# Patient Record
Sex: FEMALE | Race: WHITE | Employment: OTHER | URBAN - METROPOLITAN AREA
[De-identification: names, ages, dates, MRNs, and addresses within clinical notes are randomized per-mention and may not be internally consistent; named-entity substitution may affect disease eponyms.]

---

## 2017-01-05 ENCOUNTER — ANTI-COAG VISIT (OUTPATIENT)
Dept: PHARMACY | Facility: CLINIC | Age: 77
End: 2017-01-05

## 2017-01-05 LAB — INR BLD: 2.4

## 2017-01-16 RX ORDER — DILTIAZEM HYDROCHLORIDE 120 MG/1
CAPSULE, EXTENDED RELEASE ORAL
Qty: 30 CAPSULE | Refills: 5 | Status: SHIPPED | OUTPATIENT
Start: 2017-01-16 | End: 2017-08-10 | Stop reason: SDUPTHER

## 2017-02-02 ENCOUNTER — ANTI-COAG VISIT (OUTPATIENT)
Dept: PHARMACY | Facility: CLINIC | Age: 77
End: 2017-02-02

## 2017-02-02 LAB — INR BLD: 1.9

## 2017-03-02 ENCOUNTER — ANTI-COAG VISIT (OUTPATIENT)
Dept: PHARMACY | Facility: CLINIC | Age: 77
End: 2017-03-02

## 2017-03-02 LAB — INR BLD: 2.2

## 2017-03-29 ENCOUNTER — TELEPHONE (OUTPATIENT)
Dept: CARDIOLOGY CLINIC | Age: 77
End: 2017-03-29

## 2017-03-30 ENCOUNTER — ANTI-COAG VISIT (OUTPATIENT)
Dept: PHARMACY | Facility: CLINIC | Age: 77
End: 2017-03-30

## 2017-03-30 LAB — INR BLD: 1.9

## 2017-04-03 ENCOUNTER — TELEPHONE (OUTPATIENT)
Dept: CARDIOLOGY CLINIC | Age: 77
End: 2017-04-03

## 2017-04-04 ENCOUNTER — TELEPHONE (OUTPATIENT)
Dept: CARDIOLOGY CLINIC | Age: 77
End: 2017-04-04

## 2017-04-04 ENCOUNTER — OFFICE VISIT (OUTPATIENT)
Dept: CARDIOLOGY CLINIC | Age: 77
End: 2017-04-04

## 2017-04-04 VITALS
BODY MASS INDEX: 32.27 KG/M2 | HEART RATE: 83 BPM | OXYGEN SATURATION: 93 % | SYSTOLIC BLOOD PRESSURE: 128 MMHG | WEIGHT: 189 LBS | HEIGHT: 64 IN | DIASTOLIC BLOOD PRESSURE: 72 MMHG

## 2017-04-04 DIAGNOSIS — Z01.818 PREOPERATIVE CLEARANCE: Primary | ICD-10-CM

## 2017-04-04 DIAGNOSIS — Z01.810 PRE-OPERATIVE CARDIOVASCULAR EXAMINATION: ICD-10-CM

## 2017-04-04 PROCEDURE — 93000 ELECTROCARDIOGRAM COMPLETE: CPT | Performed by: INTERNAL MEDICINE

## 2017-04-04 PROCEDURE — 99214 OFFICE O/P EST MOD 30 MIN: CPT | Performed by: INTERNAL MEDICINE

## 2017-05-04 ENCOUNTER — ANTI-COAG VISIT (OUTPATIENT)
Dept: PHARMACY | Facility: CLINIC | Age: 77
End: 2017-05-04

## 2017-05-04 LAB — INR BLD: 1.8

## 2017-06-01 ENCOUNTER — ANTI-COAG VISIT (OUTPATIENT)
Dept: PHARMACY | Facility: CLINIC | Age: 77
End: 2017-06-01

## 2017-06-01 LAB — INR BLD: 3.3

## 2017-06-15 ENCOUNTER — ANTI-COAG VISIT (OUTPATIENT)
Dept: PHARMACY | Facility: CLINIC | Age: 77
End: 2017-06-15

## 2017-06-15 LAB — INR BLD: 2.2

## 2017-06-29 ENCOUNTER — OFFICE VISIT (OUTPATIENT)
Dept: CARDIOLOGY CLINIC | Age: 77
End: 2017-06-29

## 2017-06-29 VITALS
HEART RATE: 85 BPM | DIASTOLIC BLOOD PRESSURE: 82 MMHG | SYSTOLIC BLOOD PRESSURE: 134 MMHG | BODY MASS INDEX: 34.6 KG/M2 | WEIGHT: 188 LBS | HEIGHT: 62 IN

## 2017-06-29 DIAGNOSIS — I48.0 PAF (PAROXYSMAL ATRIAL FIBRILLATION) (HCC): Primary | ICD-10-CM

## 2017-06-29 PROCEDURE — 99213 OFFICE O/P EST LOW 20 MIN: CPT | Performed by: NURSE PRACTITIONER

## 2017-06-29 PROCEDURE — 93000 ELECTROCARDIOGRAM COMPLETE: CPT | Performed by: NURSE PRACTITIONER

## 2017-07-13 ENCOUNTER — ANTI-COAG VISIT (OUTPATIENT)
Dept: PHARMACY | Facility: CLINIC | Age: 77
End: 2017-07-13

## 2017-07-13 LAB — INR BLD: 2.3

## 2017-07-26 RX ORDER — WARFARIN SODIUM 5 MG/1
5 TABLET ORAL DAILY
Qty: 30 TABLET | Refills: 9 | Status: SHIPPED | OUTPATIENT
Start: 2017-07-26 | End: 2018-02-14 | Stop reason: SDUPTHER

## 2017-08-10 ENCOUNTER — ANTI-COAG VISIT (OUTPATIENT)
Dept: PHARMACY | Facility: CLINIC | Age: 77
End: 2017-08-10

## 2017-08-10 LAB — INR BLD: 2

## 2017-08-10 RX ORDER — DILTIAZEM HYDROCHLORIDE 120 MG/1
CAPSULE, EXTENDED RELEASE ORAL
Qty: 30 CAPSULE | Refills: 5 | Status: SHIPPED | OUTPATIENT
Start: 2017-08-10 | End: 2018-03-09 | Stop reason: SDUPTHER

## 2017-09-07 ENCOUNTER — ANTI-COAG VISIT (OUTPATIENT)
Dept: PHARMACY | Facility: CLINIC | Age: 77
End: 2017-09-07

## 2017-09-07 LAB — INR BLD: 2.1

## 2017-10-05 ENCOUNTER — ANTI-COAG VISIT (OUTPATIENT)
Dept: PHARMACY | Facility: CLINIC | Age: 77
End: 2017-10-05

## 2017-10-05 LAB — INR BLD: 1.5

## 2017-10-05 NOTE — PROGRESS NOTES
Ms. Calista Mcguire is here for management of anticoagulation for Afib. Patient was started on Warfarin on 8/6/15. PMH also significant for colonic polyp, and vertigo. She presents today w/out complaint. Pt verifies dosing regimen as listed above. Pt denies s/s bleeding/bruising/swelling/SOB. No BRBPR. No melena. Pt reports no missed doses. No changes in Rx/OTC/herbal medications. Still taking Glucosamine + chondroitin (2 a day). No tobacco use. She reports that she drinks about 8.4 oz of alcohol per week. INR 1.5 is below acceptable therapeutic range of 2-3. Recommend to take 7.5mg today only then continue 5mg Q Sun/Tue/Thu and 2.5 mg all other days. Patient has 5 mg tablets. Will continue to monitor and check INR in 2 weeks. Dosing reminder card given with phone number, appointment date and time.    Return to clinic: 10/19/17 @ 945 am

## 2017-10-19 ENCOUNTER — ANTI-COAG VISIT (OUTPATIENT)
Dept: PHARMACY | Facility: CLINIC | Age: 77
End: 2017-10-19

## 2017-10-19 LAB — INR BLD: 2

## 2017-10-19 NOTE — PROGRESS NOTES
Ms. Yogesh Luong is here for management of anticoagulation for Afib. Patient was started on Warfarin on 8/6/15. PMH also significant for colonic polyp, and vertigo. She presents today w/out complaint. Pt verifies dosing regimen as listed above. Pt denies s/s bleeding/bruising/swelling/SOB. No BRBPR. No melena. Pt reports no missed doses. No changes in Rx/OTC/herbal medications. Still taking Glucosamine + chondroitin (2 a day). No tobacco use. She reports that she drinks about 8.4 oz of alcohol per week. INR 2.0 is within acceptable therapeutic range of 2-3. Recommend to continue 5mg Q Sun/Tue/Thu and 2.5 mg all other days. Patient has 5 mg tablets. Will continue to monitor and check INR in 4 weeks. Dosing reminder card given with phone number, appointment date and time.    Return to clinic: 11/16/17 @ 945 am    Edward Hinds PharmD 9:48 AM 10/19/17

## 2017-11-01 ENCOUNTER — HOSPITAL ENCOUNTER (OUTPATIENT)
Dept: OTHER | Age: 77
Discharge: HOME | End: 2017-11-30
Attending: FAMILY MEDICINE | Admitting: FAMILY MEDICINE

## 2017-11-16 ENCOUNTER — ANTI-COAG VISIT (OUTPATIENT)
Dept: PHARMACY | Facility: CLINIC | Age: 77
End: 2017-11-16

## 2017-11-16 LAB — INR BLD: 1.7

## 2017-11-30 ENCOUNTER — ANTI-COAG VISIT (OUTPATIENT)
Dept: PHARMACY | Facility: CLINIC | Age: 77
End: 2017-11-30

## 2017-11-30 LAB — INR BLD: 2.6

## 2017-12-01 ENCOUNTER — HOSPITAL ENCOUNTER (OUTPATIENT)
Dept: OTHER | Age: 77
Discharge: HOME | End: 2017-12-31
Attending: FAMILY MEDICINE | Admitting: FAMILY MEDICINE

## 2018-01-04 ENCOUNTER — HOSPITAL ENCOUNTER (OUTPATIENT)
Dept: OTHER | Age: 78
Discharge: HOME | End: 2018-01-31
Attending: FAMILY MEDICINE | Admitting: FAMILY MEDICINE

## 2018-01-04 ENCOUNTER — ANTI-COAG VISIT (OUTPATIENT)
Dept: PHARMACY | Facility: CLINIC | Age: 78
End: 2018-01-04

## 2018-01-04 LAB — INR BLD: 2.9

## 2018-02-01 ENCOUNTER — ANTI-COAG VISIT (OUTPATIENT)
Dept: PHARMACY | Facility: CLINIC | Age: 78
End: 2018-02-01

## 2018-02-01 ENCOUNTER — HOSPITAL ENCOUNTER (OUTPATIENT)
Dept: OTHER | Age: 78
Discharge: HOME | End: 2018-02-28
Attending: FAMILY MEDICINE | Admitting: FAMILY MEDICINE

## 2018-02-01 LAB — INR BLD: 2.5

## 2018-02-01 NOTE — PROGRESS NOTES
Ms. Shannon Stacy is here for management of anticoagulation for Afib. Patient was started on Warfarin on 8/6/15. PMH also significant for colonic polyp, and vertigo. She presents today w/out complaint. Pt verifies dosing regimen as listed above. Pt denies s/s bleeding/bruising/swelling/SOB. No BRBPR. No melena. Pt reports no missed doses. No changes in Rx/OTC/herbal medications. Still taking Glucosamine + chondroitin (2 a day). No tobacco use. She reports that she drinks about 8.4 oz of alcohol per week. INR 2.5 is within acceptable therapeutic range of 2-3. Recommend to continue dose of 2.5 mg Mon/Wed/Fri and 5 mg all other days. Patient has 5 mg tablets. Will continue to monitor and check INR in 4 weeks. Dosing reminder card given with phone number, appointment date and time.    Return to clinic: 3/1 @ [address] [place], [place] [address]Gila 10:38 AM 2/1/18

## 2018-02-14 RX ORDER — WARFARIN SODIUM 5 MG/1
5 TABLET ORAL DAILY
Qty: 30 TABLET | Refills: 3 | Status: SHIPPED | OUTPATIENT
Start: 2018-02-14 | End: 2019-05-13 | Stop reason: SDUPTHER

## 2018-02-14 NOTE — TELEPHONE ENCOUNTER
Refill needed warfarin (COUMADIN) 5 MG tablet    [id number] [place] [facility] [address] [phone]

## 2018-03-01 ENCOUNTER — HOSPITAL ENCOUNTER (OUTPATIENT)
Dept: OTHER | Age: 78
Discharge: HOME | End: 2018-03-31
Attending: FAMILY MEDICINE | Admitting: FAMILY MEDICINE

## 2018-03-01 ENCOUNTER — ANTI-COAG VISIT (OUTPATIENT)
Dept: PHARMACY | Facility: CLINIC | Age: 78
End: 2018-03-01

## 2018-03-01 LAB — INR BLD: 2.7

## 2018-03-01 NOTE — PROGRESS NOTES
Ms. Beny Benavides is here for management of anticoagulation for Afib. Patient was started on Warfarin on 8/6/15. PMH also significant for colonic polyp, and vertigo. She presents today w/out complaint. Pt verifies dosing regimen as listed above. Pt denies s/s bleeding/bruising/swelling/SOB. No BRBPR. No melena. Pt reports no missed doses. No changes in Rx/OTC/herbal medications. Still taking Glucosamine + chondroitin (2 a day). No tobacco use. She reports that she drinks about 8.4 oz of alcohol per week. INR 2.7 is within acceptable therapeutic range of 2-3. Recommend to continue dose of 2.5 mg Mon/Wed/Fri and 5 mg all other days. Patient has 5 mg tablets. Will continue to monitor and check INR in 4 weeks. Dosing reminder card given with phone number, appointment date and time.    Return to clinic: 3/29 @ 9:45 am    Benjamin Tenorio, PharmD 3/1/2018 10:08 AM

## 2018-03-09 RX ORDER — DILTIAZEM HYDROCHLORIDE 120 MG/1
CAPSULE, EXTENDED RELEASE ORAL
Qty: 30 CAPSULE | Refills: 3 | Status: SHIPPED | OUTPATIENT
Start: 2018-03-09 | End: 2018-08-23 | Stop reason: SDUPTHER

## 2018-03-09 NOTE — TELEPHONE ENCOUNTER
Pt sts her refill ondiltiazem (TIAZAC) 120 MG extended release capsule      has not been sent in. Pt sts she is completely out.

## 2018-03-27 ENCOUNTER — TELEPHONE (OUTPATIENT)
Dept: CARDIOLOGY CLINIC | Age: 78
End: 2018-03-27

## 2018-03-29 ENCOUNTER — ANTI-COAG VISIT (OUTPATIENT)
Dept: PHARMACY | Facility: CLINIC | Age: 78
End: 2018-03-29

## 2018-03-29 LAB — INR BLD: 2.5

## 2018-04-01 ENCOUNTER — HOSPITAL ENCOUNTER (OUTPATIENT)
Dept: OTHER | Age: 78
Discharge: HOME | End: 2018-04-30
Attending: FAMILY MEDICINE | Admitting: FAMILY MEDICINE

## 2018-04-26 ENCOUNTER — ANTI-COAG VISIT (OUTPATIENT)
Dept: PHARMACY | Facility: CLINIC | Age: 78
End: 2018-04-26

## 2018-04-26 LAB — INR BLD: 3.5

## 2018-05-01 ENCOUNTER — HOSPITAL ENCOUNTER (OUTPATIENT)
Dept: OTHER | Age: 78
Discharge: HOME | End: 2018-05-31
Attending: FAMILY MEDICINE | Admitting: FAMILY MEDICINE

## 2018-05-10 ENCOUNTER — ANTI-COAG VISIT (OUTPATIENT)
Dept: PHARMACY | Facility: CLINIC | Age: 78
End: 2018-05-10

## 2018-05-10 LAB — INR BLD: 2.9

## 2018-06-01 ENCOUNTER — HOSPITAL ENCOUNTER (OUTPATIENT)
Dept: OTHER | Age: 78
Discharge: HOME | End: 2018-06-30
Attending: FAMILY MEDICINE | Admitting: FAMILY MEDICINE

## 2018-06-07 ENCOUNTER — ANTI-COAG VISIT (OUTPATIENT)
Dept: PHARMACY | Facility: CLINIC | Age: 78
End: 2018-06-07

## 2018-06-07 LAB — INR BLD: 3.5

## 2018-06-21 ENCOUNTER — ANTI-COAG VISIT (OUTPATIENT)
Dept: PHARMACY | Facility: CLINIC | Age: 78
End: 2018-06-21

## 2018-06-21 LAB — INR BLD: 3

## 2018-07-01 ENCOUNTER — HOSPITAL ENCOUNTER (OUTPATIENT)
Dept: OTHER | Age: 78
Discharge: HOME | End: 2018-07-01
Attending: FAMILY MEDICINE | Admitting: FAMILY MEDICINE

## 2018-07-05 ENCOUNTER — ANTI-COAG VISIT (OUTPATIENT)
Dept: PHARMACY | Facility: CLINIC | Age: 78
End: 2018-07-05

## 2018-07-05 LAB — INR BLD: 2.3

## 2018-08-02 ENCOUNTER — ANTI-COAG VISIT (OUTPATIENT)
Dept: PHARMACY | Age: 78
End: 2018-08-02
Payer: MEDICARE

## 2018-08-02 LAB — INR BLD: 1.9

## 2018-08-02 PROCEDURE — 85610 PROTHROMBIN TIME: CPT

## 2018-08-02 PROCEDURE — 99211 OFF/OP EST MAY X REQ PHY/QHP: CPT

## 2018-08-23 ENCOUNTER — OFFICE VISIT (OUTPATIENT)
Dept: CARDIOLOGY CLINIC | Age: 78
End: 2018-08-23

## 2018-08-23 VITALS
HEIGHT: 62 IN | HEART RATE: 74 BPM | DIASTOLIC BLOOD PRESSURE: 64 MMHG | SYSTOLIC BLOOD PRESSURE: 118 MMHG | BODY MASS INDEX: 35.51 KG/M2 | WEIGHT: 193 LBS

## 2018-08-23 DIAGNOSIS — I48.0 PAF (PAROXYSMAL ATRIAL FIBRILLATION) (HCC): Primary | ICD-10-CM

## 2018-08-23 PROCEDURE — 99213 OFFICE O/P EST LOW 20 MIN: CPT | Performed by: NURSE PRACTITIONER

## 2018-08-23 PROCEDURE — 93000 ELECTROCARDIOGRAM COMPLETE: CPT | Performed by: NURSE PRACTITIONER

## 2018-08-23 NOTE — PROGRESS NOTES
[facility]   Electrophysiology  Note              Date:  August 23, 2018  Patient name: Fior Tripp  YOB: 1940    Primary Care physician: Sharee Connor MD    HISTORY OF PRESENT ILLNESS: The patient is a 69 y.o.  female with a history of symptomatic paroxysmal atrial fibrillation and vertigo. She was diagnosed with atrial fibrillation in 8/2015 after going to the ER with chest pain. She converted to sinus rhythm spontaneously. Echo in 8/2015 showed an EF of 55-60%. She has remained in sinus rhythm at subsequent visits. Today she is being seen for paroxysmal atrial fibrillation. Her EKG shows sinus rhythm with a heart rate of 75. She is feeling generally well, has some fatigue. Has taken one extra Cardizem \"rarely\" due to \"heart feeling funny\". Estimates she has done this 3 times in the last year and feels relief after taking. Has difficulty describing sensation. Denies chest pain and dizziness. States her  does not report she snores. Past Medical History:   has a past medical history of Atrial fibrillation ([address] Colonic polyp; Paroxysmal atrial fibrillation ([address] and Vertigo. Past Surgical History:   has a past surgical history that includes Tonsillectomy; Colonoscopy (07/31/2007); and joint replacement (04/2016). Home Medications:    Prior to Admission medications    Medication Sig Start Date End Date Taking? Authorizing Provider   diltiazem [facility]) 120 MG extended release capsule TAKE 1 CAPSULE BY MOUTH DAILY 3/12/18  Yes JENNIFER Tiwari CNP   warfarin (COUMADIN) 5 MG tablet Take 1 tablet by mouth daily 2/14/18  Yes JENNIFER Tiwari CNP   meclizine (ANTIVERT) 25 MG tablet Take 25 mg by mouth 3 times daily as needed   Yes Historical Provider, MD   Glucosamine Sulfate 750 MG TABS Take 1 tablet by mouth daily. Yes Historical Provider, MD   Multiple Vitamin (MULTIVITAMIN PO) Take  by mouth.      Yes Historical Provider, MD

## 2018-08-23 NOTE — COMMUNICATION BODY
LUTEIN PO Take  by mouth. Yes Historical Provider, MD       Allergies:  Lyrica [pregabalin]    Social History:   reports that she has never smoked. She has never used smokeless tobacco. She reports that she drinks about 8.4 oz of alcohol per week . She reports that she does not use drugs. Family History: family history includes Colon Cancer (age of onset: 61) in her mother. Review of Systems   Constitutional: + fatigue  HENT: Negative. Eyes: Negative. Respiratory: Negative. Cardiovascular: see HPI  Gastrointestinal: Negative. Genitourinary: Negative. Musculoskeletal: Negative   Skin: Negative. Neurological: Negative. Hematological: Negative. Psychiatric/Behavioral: Negative. PHYSICAL EXAM:    Physical Examination:    /64   Pulse 74   Ht 5' 2\" (1.575 m)   Wt 193 lb (87.5 kg)   BMI 35.30 kg/m²       Constitutional and general appearance: alert, cooperative, no distress and appears stated age  [de-identified]: PERRL, no cervical lymphadenopathy. No masses palpable.  Normal oral mucosa  Respiratory:  · Normal excursion and expansion without use of accessory muscles  · Resp auscultation: Normal breath sounds without dullness or wheezing  Cardiovascular:  · The apical impulse is not displaced  · Heart tones are crisp and normal. Regular S1 and S2.  · Jugular venous pulsation Normal  · The carotid upstroke is normal in amplitude and contour without delay or bruit  · Peripheral pulses are symmetrical and full   Abdomen:  · No masses or tenderness  · Bowel sounds present  Extremities:  ·  No cyanosis or clubbing  ·  No lower extremity edema (left ankle is larger than right ankle since hip surgery)  ·  Skin: warm and dry  Neurological:  · Alert and oriented  · Moves all extremities equally  · No abnormalities of mood, affect, memory, mentation, or behavior are noted    DATA:    ECG 8/23/2018:  SR HR 75    Echo 8/6/2015  Left ventricle size is normal.   Moderate concentric left

## 2018-09-06 ENCOUNTER — ANTI-COAG VISIT (OUTPATIENT)
Dept: PHARMACY | Age: 78
End: 2018-09-06
Payer: MEDICARE

## 2018-09-06 LAB — INR BLD: 2.8

## 2018-09-06 PROCEDURE — 99211 OFF/OP EST MAY X REQ PHY/QHP: CPT | Performed by: PHARMACIST

## 2018-09-06 PROCEDURE — 85610 PROTHROMBIN TIME: CPT | Performed by: PHARMACIST

## 2018-10-04 ENCOUNTER — ANTI-COAG VISIT (OUTPATIENT)
Dept: PHARMACY | Age: 78
End: 2018-10-04
Payer: MEDICARE

## 2018-10-04 LAB — INR BLD: 2.5

## 2018-10-04 PROCEDURE — 99211 OFF/OP EST MAY X REQ PHY/QHP: CPT | Performed by: FAMILY MEDICINE

## 2018-10-04 PROCEDURE — 85610 PROTHROMBIN TIME: CPT | Performed by: FAMILY MEDICINE

## 2018-11-01 ENCOUNTER — ANTI-COAG VISIT (OUTPATIENT)
Dept: PHARMACY | Age: 78
End: 2018-11-01
Payer: MEDICARE

## 2018-11-01 LAB — INTERNATIONAL NORMALIZATION RATIO, POC: 1.8

## 2018-11-01 PROCEDURE — 99211 OFF/OP EST MAY X REQ PHY/QHP: CPT

## 2018-11-01 PROCEDURE — 85610 PROTHROMBIN TIME: CPT

## 2018-11-18 NOTE — PROGRESS NOTES
Ms. Karri Garza is here for management of anticoagulation for Afib. Patient was started on Warfarin on 8/6/15. PMH also significant for colonic polyp, and vertigo. She presents today w/out complaint. Pt verifies dosing regimen as listed above. Pt denies s/s bleeding/bruising/swelling/SOB. Pt reports no missed doses. No changes in Rx/OTC/herbal medications. Still taking Glucosamine + chondroitin (2 a day) and also meclizine. No tobacco use. She reports that she drinks one drink about every night. EtOH intake and diet has been inconsistent. Has improved per patient    Patient reports she has eaten more greens than normal this week. INR 1.9 is within acceptable therapeutic range of 2-3. Recommend to continue 5 mg Mon/Wed/Fri; and 2.5 mg all other days. Pt will cut back on greens in her diet. Patient has 5 mg tablets. Will continue to monitor and check INR in 4 weeks. Dosing reminder card given with phone number, appointment date and time.    Return to clinic: 9/6/18 at 945am. Per EMS pt reports \"bending over with grabber bar last night and heard a \"pop\" in chest\". Reports right sided chest pain. Painful to touch.

## 2018-11-29 ENCOUNTER — ANTI-COAG VISIT (OUTPATIENT)
Dept: PHARMACY | Age: 78
End: 2018-11-29
Payer: MEDICARE

## 2018-11-29 LAB — INR BLD: 2.4

## 2018-11-29 PROCEDURE — 85610 PROTHROMBIN TIME: CPT | Performed by: FAMILY MEDICINE

## 2018-11-29 PROCEDURE — 99211 OFF/OP EST MAY X REQ PHY/QHP: CPT | Performed by: FAMILY MEDICINE

## 2019-01-03 ENCOUNTER — ANTI-COAG VISIT (OUTPATIENT)
Dept: PHARMACY | Age: 79
End: 2019-01-03
Payer: MEDICARE

## 2019-01-03 LAB — INR BLD: 2.7

## 2019-01-03 PROCEDURE — 99211 OFF/OP EST MAY X REQ PHY/QHP: CPT

## 2019-01-03 PROCEDURE — 85610 PROTHROMBIN TIME: CPT

## 2019-01-31 ENCOUNTER — ANTI-COAG VISIT (OUTPATIENT)
Dept: PHARMACY | Age: 79
End: 2019-01-31
Payer: MEDICARE

## 2019-01-31 LAB — INR BLD: 2.5

## 2019-01-31 PROCEDURE — 85610 PROTHROMBIN TIME: CPT | Performed by: FAMILY MEDICINE

## 2019-01-31 PROCEDURE — 99211 OFF/OP EST MAY X REQ PHY/QHP: CPT | Performed by: FAMILY MEDICINE

## 2019-02-28 ENCOUNTER — ANTI-COAG VISIT (OUTPATIENT)
Dept: PHARMACY | Age: 79
End: 2019-02-28
Payer: MEDICARE

## 2019-02-28 LAB — INR BLD: 1.8

## 2019-02-28 PROCEDURE — 99211 OFF/OP EST MAY X REQ PHY/QHP: CPT | Performed by: FAMILY MEDICINE

## 2019-02-28 PROCEDURE — 85610 PROTHROMBIN TIME: CPT | Performed by: FAMILY MEDICINE

## 2019-03-28 ENCOUNTER — ANTI-COAG VISIT (OUTPATIENT)
Dept: PHARMACY | Age: 79
End: 2019-03-28
Payer: MEDICARE

## 2019-03-28 LAB — INR BLD: 2.1

## 2019-03-28 PROCEDURE — 85610 PROTHROMBIN TIME: CPT

## 2019-03-28 PROCEDURE — 99211 OFF/OP EST MAY X REQ PHY/QHP: CPT

## 2019-04-01 DIAGNOSIS — I48.0 PAF (PAROXYSMAL ATRIAL FIBRILLATION) (HCC): ICD-10-CM

## 2019-04-02 RX ORDER — DILTIAZEM HYDROCHLORIDE 120 MG/1
CAPSULE, EXTENDED RELEASE ORAL
Qty: 90 CAPSULE | Refills: 1 | Status: SHIPPED | OUTPATIENT
Start: 2019-04-02 | End: 2019-07-09 | Stop reason: SDUPTHER

## 2019-04-25 ENCOUNTER — ANTI-COAG VISIT (OUTPATIENT)
Dept: PHARMACY | Age: 79
End: 2019-04-25
Payer: MEDICARE

## 2019-04-25 LAB — INTERNATIONAL NORMALIZATION RATIO, POC: 2.2

## 2019-04-25 PROCEDURE — 99211 OFF/OP EST MAY X REQ PHY/QHP: CPT

## 2019-04-25 PROCEDURE — 85610 PROTHROMBIN TIME: CPT

## 2019-05-13 ENCOUNTER — TELEPHONE (OUTPATIENT)
Dept: CARDIOLOGY CLINIC | Age: 79
End: 2019-05-13

## 2019-05-13 RX ORDER — WARFARIN SODIUM 5 MG/1
5 TABLET ORAL DAILY
Qty: 90 TABLET | Refills: 0 | Status: SHIPPED | OUTPATIENT
Start: 2019-05-13 | End: 2020-06-09 | Stop reason: SDUPTHER

## 2019-05-14 DIAGNOSIS — I48.0 PAF (PAROXYSMAL ATRIAL FIBRILLATION) (HCC): Primary | ICD-10-CM

## 2019-05-23 ENCOUNTER — HOSPITAL ENCOUNTER (OUTPATIENT)
Age: 79
Discharge: HOME | End: 2019-05-23
Payer: MEDICARE

## 2019-05-23 ENCOUNTER — ANTI-COAG VISIT (OUTPATIENT)
Dept: PHARMACY | Age: 79
End: 2019-05-23
Payer: MEDICARE

## 2019-05-23 DIAGNOSIS — I48.0 PAF (PAROXYSMAL ATRIAL FIBRILLATION) (HCC): ICD-10-CM

## 2019-05-23 LAB
BASOPHILS ABSOLUTE: 0.1 K/UL (ref 0–0.2)
BASOPHILS RELATIVE PERCENT: 1.1 %
EOSINOPHILS ABSOLUTE: 0.2 K/UL (ref 0–0.6)
EOSINOPHILS RELATIVE PERCENT: 2.7 %
HCT VFR BLD CALC: 44.3 % (ref 36–48)
HEMOGLOBIN: 15.2 G/DL (ref 12–16)
INR BLD: 2.2
LYMPHOCYTES ABSOLUTE: 2.5 K/UL (ref 1–5.1)
LYMPHOCYTES RELATIVE PERCENT: 44 %
MCH RBC QN AUTO: 33.9 PG (ref 26–34)
MCHC RBC AUTO-ENTMCNC: 34.4 G/DL (ref 31–36)
MCV RBC AUTO: 98.8 FL (ref 80–100)
MONOCYTES ABSOLUTE: 0.7 K/UL (ref 0–1.3)
MONOCYTES RELATIVE PERCENT: 11.4 %
NEUTROPHILS ABSOLUTE: 2.3 K/UL (ref 1.7–7.7)
NEUTROPHILS RELATIVE PERCENT: 40.8 %
PDW BLD-RTO: 13.1 % (ref 12.4–15.4)
PLATELET # BLD: 242 K/UL (ref 135–450)
PMV BLD AUTO: 7.6 FL (ref 5–10.5)
RBC # BLD: 4.48 M/UL (ref 4–5.2)
WBC # BLD: 5.8 K/UL (ref 4–11)

## 2019-05-23 PROCEDURE — 85610 PROTHROMBIN TIME: CPT

## 2019-05-23 PROCEDURE — 36415 COLL VENOUS BLD VENIPUNCTURE: CPT

## 2019-05-23 PROCEDURE — 99211 OFF/OP EST MAY X REQ PHY/QHP: CPT

## 2019-05-23 PROCEDURE — 85025 COMPLETE CBC W/AUTO DIFF WBC: CPT

## 2019-05-28 ENCOUNTER — TELEPHONE (OUTPATIENT)
Dept: CARDIOLOGY CLINIC | Age: 79
End: 2019-05-28

## 2019-05-28 NOTE — TELEPHONE ENCOUNTER
Created telephone encounter. Spoke with NPBB relayed message per NPBB regarding labs. Pt verbalized understanding.

## 2019-06-20 ENCOUNTER — ANTI-COAG VISIT (OUTPATIENT)
Dept: PHARMACY | Age: 79
End: 2019-06-20
Payer: MEDICARE

## 2019-06-20 LAB — INR BLD: 2.3

## 2019-06-20 PROCEDURE — 85610 PROTHROMBIN TIME: CPT

## 2019-06-20 PROCEDURE — 99211 OFF/OP EST MAY X REQ PHY/QHP: CPT

## 2019-06-20 NOTE — PROGRESS NOTES
Ms. Rupert Stroud is here for management of anticoagulation for Afib. Patient was started on Warfarin on 8/6/15. PMH also significant for colonic polyp, and vertigo. She presents today w/out complaint. Pt verifies dosing regimen as listed above  No missed doses  Pt denies s/s bleeding/swelling/SOB. No changes in Rx/OTC/herbal medications. Still taking Glucosamine + chondroitin (2 once a day) and also meclizine. Takes APAP prn. No tobacco use. She reports that she drinks one drink about every night. EtOH intake and diet has been not changed  Patient reports she has eaten greens more consistently    Would like a new Rx sent to the [facility] on [address]     INR 2.3 is within acceptable therapeutic range of 2-3. Recommend to continue 5 mg Mon/Wed/Fri; and 2.5 mg all other days. Patient has 5 mg tablets. Will continue to monitor and check INR in 4 weeks. Dosing reminder card given with phone number, appointment date and time.    Return to clinic: 7/16 @ 9:45 am   Referring MD: Dr. Debbie Paez

## 2019-07-09 DIAGNOSIS — I48.0 PAF (PAROXYSMAL ATRIAL FIBRILLATION) (HCC): ICD-10-CM

## 2019-07-09 RX ORDER — DILTIAZEM HYDROCHLORIDE 120 MG/1
CAPSULE, EXTENDED RELEASE ORAL
Qty: 90 CAPSULE | Refills: 0 | Status: SHIPPED | OUTPATIENT
Start: 2019-07-09 | End: 2019-09-18 | Stop reason: SDUPTHER

## 2019-07-16 ENCOUNTER — ANTI-COAG VISIT (OUTPATIENT)
Dept: PHARMACY | Age: 79
End: 2019-07-16
Payer: MEDICARE

## 2019-07-16 LAB — INR BLD: 2.2

## 2019-07-16 PROCEDURE — 85610 PROTHROMBIN TIME: CPT | Performed by: INTERNAL MEDICINE

## 2019-07-16 PROCEDURE — 99211 OFF/OP EST MAY X REQ PHY/QHP: CPT | Performed by: INTERNAL MEDICINE

## 2019-08-15 ENCOUNTER — ANTI-COAG VISIT (OUTPATIENT)
Dept: PHARMACY | Age: 79
End: 2019-08-15
Payer: MEDICARE

## 2019-08-15 LAB — INTERNATIONAL NORMALIZATION RATIO, POC: 3.2

## 2019-08-15 PROCEDURE — 99211 OFF/OP EST MAY X REQ PHY/QHP: CPT

## 2019-08-15 PROCEDURE — 85610 PROTHROMBIN TIME: CPT

## 2019-08-15 NOTE — PROGRESS NOTES
Ms. Alee Hooker is here for management of anticoagulation for Afib. Patient was started on Warfarin on 8/6/15. PMH also significant for colonic polyp, and vertigo. She presents today w/out complaint. Pt verifies dosing regimen as listed above  No missed doses  Pt denies s/s bleeding/swelling/SOB. No changes in Rx/OTC/herbal medications. Still taking Glucosamine + chondroitin (2 once a day) and also meclizine. Takes APAP prn. No tobacco use. She reports that she drinks one drink about every night. EtOH intake and diet has been not changed  Patient reports she has eaten greens more consistently    Eating less green than usual. Patient plans to go back to eating more spinach. INR 3.2 is slightly higher than acceptable therapeutic range of 2-3. Supratherapeutic INR could be explained by change in diet. Recommend to continue 5 mg Mon/Wed/Fri; and 2.5 mg all other days. Patient has 5 mg tablets. Will continue to monitor and check INR in 4 weeks. Dosing reminder card given with phone number, appointment date and time.    Return to clinic: 9/12 @ 9:45 am   Referring MD: Dr. Flako Carbajal PharmD  8/15/2019 at 9:52 AM

## 2019-08-27 ENCOUNTER — OFFICE VISIT (OUTPATIENT)
Dept: CARDIOLOGY CLINIC | Age: 79
End: 2019-08-27
Payer: MEDICARE

## 2019-08-27 VITALS
DIASTOLIC BLOOD PRESSURE: 78 MMHG | HEIGHT: 62 IN | HEART RATE: 74 BPM | BODY MASS INDEX: 35.7 KG/M2 | WEIGHT: 194 LBS | SYSTOLIC BLOOD PRESSURE: 114 MMHG

## 2019-08-27 DIAGNOSIS — I48.0 PAF (PAROXYSMAL ATRIAL FIBRILLATION) (HCC): Primary | ICD-10-CM

## 2019-08-27 PROCEDURE — 93000 ELECTROCARDIOGRAM COMPLETE: CPT | Performed by: NURSE PRACTITIONER

## 2019-08-27 PROCEDURE — 99213 OFFICE O/P EST LOW 20 MIN: CPT | Performed by: NURSE PRACTITIONER

## 2019-08-27 NOTE — PROGRESS NOTES
[facility]   Electrophysiology  Note              Date:  August 27, 2019  Patient name: Gloria Espinal  YOB: 1940    Primary Care physician: Wade Le MD    HISTORY OF PRESENT ILLNESS: The patient is a 69 y.o.  female with a history of symptomatic paroxysmal atrial fibrillation and vertigo. She was diagnosed with atrial fibrillation in 8/2015 after going to the ER with chest pain. She converted to sinus rhythm spontaneously. Echo in 8/2015 showed an EF of 55-60%. She has remained in sinus rhythm at subsequent visits. Today she is being seen for paroxysmal atrial fibrillation. Her EKG shows sinus rhythm with a heart rate of 75. States once since last visit she felt poorly (got up from chair, felt she may fall), checked HR on pulse ox (reported as elevated) and after a few minutes it went back to normal. Has been feeling weak lately and states hips and legs ache. Has some dizziness with standing and is not drinking much water. Denies chest pain and SOB. Past Medical History:   has a past medical history of Atrial fibrillation [facility]), Colonic polyp, Paroxysmal atrial fibrillation ([facility]), and Vertigo. Past Surgical History:   has a past surgical history that includes Tonsillectomy; Colonoscopy (07/31/2007); and joint replacement (04/2016). Home Medications:    Prior to Admission medications    Medication Sig Start Date End Date Taking? Authorizing Provider   diltiazem (TIAZAC) 120 MG extended release capsule TAKE ONE CAPSULE BY MOUTH EVERY DAY 7/9/19  Yes JENNIFER Apple - CNP   warfarin (COUMADIN) 5 MG tablet TAKE 1 TABLET BY MOUTH DAILY 5/13/19  Yes JENNIFER Apple - CNP   meclizine (ANTIVERT) 25 MG tablet Take 25 mg by mouth 3 times daily as needed   Yes Historical Provider, MD   Glucosamine Sulfate 750 MG TABS Take 1 tablet by mouth daily. Yes Historical Provider, MD   Multiple Vitamin (MULTIVITAMIN PO) Take  by mouth.      Yes Historical Provider, MD HORTON PO Take  by mouth. Yes Historical Provider, MD       Allergies:  Lyrica [pregabalin]    Social History:   reports that she has never smoked. She has never used smokeless tobacco. She reports that she drinks about 14.0 standard drinks of alcohol per week. She reports that she does not use drugs. Family History: family history includes Colon Cancer (age of onset: 61) in her mother. Review of Systems   All 14-point review of systems are completed and pertinent positives are mentioned in the history of present illness. Other systems are reviewed and are negative. PHYSICAL EXAM:    Vitals signs:    /78   Pulse 74   Ht 5' 2\" (1.575 m)   Wt 194 lb (88 kg)   BMI 35.48 kg/m²      Constitutional and general appearance: alert, cooperative, no distress and appears stated age  [de-identified]: PERRL, no cervical lymphadenopathy. No masses palpable.  Normal oral mucosa  Respiratory:  · Normal excursion and expansion without use of accessory muscles  · Resp auscultation: Normal breath sounds without dullness or wheezing  Cardiovascular:  · The apical impulse is not displaced  · Heart tones are crisp and normal. Regular S1 and S2.  · Jugular venous pulsation Normal  · The carotid upstroke is normal in amplitude and contour without delay or bruit  · Peripheral pulses are symmetrical and full   Abdomen:  · No masses or tenderness  · Bowel sounds present  Extremities:  ·  No cyanosis or clubbing  ·  No lower extremity edema (left ankle is larger than right ankle since hip surgery)  ·  Skin: warm and dry  Neurological:  · Alert and oriented  · Moves all extremities equally  · No abnormalities of mood, affect, memory, mentation, or behavior are noted    DATA:    ECG 8/27/2019:  SR HR 75    Echo 8/6/2015  Left ventricle size is normal.   Moderate concentric left ventricular hypertrophy is present.   Global ejection fraction is normal and estimated from 55% to 60%.   No regional wall motion abnormalities are noted.   Diastolic filling parameters suggests normal diastolic function .   Mild thickening of leaflets of mitral valve.   Mild mitral regurgitation is present. Mild posterior mitral annulus calcification is present. No mitral stenosis.   Aortic valve appears sclerotic but opens adequately.   No evidence of aortic valve regurgitation.   Normal right ventricular size .   Left ventricular contractility appears normal But   TAPSE values reduced     CARDIOLOGY LABS:   CBC: No results for input(s): WBC, HGB, HCT, PLT in the last 72 hours. BMP: No results for input(s): NA, K, CO2, BUN, CREATININE, LABGLOM, GLUCOSE in the last 72 hours. PT/INR: No results for input(s): PROTIME, INR in the last 72 hours. APTT:No results for input(s): APTT in the last 72 hours. FASTING LIPID PANEL:  Lab Results   Component Value Date    HDL 32 08/06/2015    LDLCALC 82 08/06/2015    TRIG 66 08/06/2015     LIVER PROFILE:No results for input(s): AST, ALT, ALB in the last 72 hours. Assessment:   1. Symptomatic paroxysmal atrial fibrillation: stable   -diagnosed in 8/2015   -on Coumadin for [id number] score 3 (age and gender)  2. Vertigo: on meclizine  3. OA: s/p hip replacement in 4/2017    Plan:   1. Continue Cardizem and Coumadin (Clinic managing)  2. Will increase Cardizem if heart racing persists  3. Annual CBC (due 5/2020)  4. Increase water intake   5.  Follow up in one year    Estrada Scott, [address]  [phone]

## 2019-08-27 NOTE — PATIENT INSTRUCTIONS
Continue current medications  Increase water intake  Call if heart racing comes back and we will increase Cardizem  Follow up in one year

## 2019-09-12 ENCOUNTER — ANTI-COAG VISIT (OUTPATIENT)
Dept: PHARMACY | Age: 79
End: 2019-09-12
Payer: MEDICARE

## 2019-09-12 LAB — INTERNATIONAL NORMALIZATION RATIO, POC: 2.5

## 2019-09-12 PROCEDURE — 85610 PROTHROMBIN TIME: CPT

## 2019-09-12 PROCEDURE — 99211 OFF/OP EST MAY X REQ PHY/QHP: CPT

## 2019-09-18 DIAGNOSIS — I48.0 PAF (PAROXYSMAL ATRIAL FIBRILLATION) (HCC): ICD-10-CM

## 2019-09-18 RX ORDER — DILTIAZEM HYDROCHLORIDE 120 MG/1
CAPSULE, EXTENDED RELEASE ORAL
Qty: 90 CAPSULE | Refills: 3 | Status: SHIPPED | OUTPATIENT
Start: 2019-09-18 | End: 2020-09-24

## 2019-10-10 ENCOUNTER — ANTI-COAG VISIT (OUTPATIENT)
Dept: PHARMACY | Age: 79
End: 2019-10-10
Payer: MEDICARE

## 2019-10-10 LAB — INTERNATIONAL NORMALIZATION RATIO, POC: 1.8

## 2019-10-10 PROCEDURE — 85610 PROTHROMBIN TIME: CPT | Performed by: FAMILY MEDICINE

## 2019-10-10 PROCEDURE — 99211 OFF/OP EST MAY X REQ PHY/QHP: CPT | Performed by: FAMILY MEDICINE

## 2019-11-07 ENCOUNTER — ANTI-COAG VISIT (OUTPATIENT)
Dept: PHARMACY | Age: 79
End: 2019-11-07
Payer: MEDICARE

## 2019-11-07 LAB — INTERNATIONAL NORMALIZATION RATIO, POC: 2.5

## 2019-12-03 ENCOUNTER — ANTI-COAG VISIT (OUTPATIENT)
Dept: PHARMACY | Age: 79
End: 2019-12-03
Payer: MEDICARE

## 2019-12-03 LAB — INR BLD: 2.2

## 2019-12-03 PROCEDURE — 85610 PROTHROMBIN TIME: CPT | Performed by: INTERNAL MEDICINE

## 2019-12-03 PROCEDURE — 99211 OFF/OP EST MAY X REQ PHY/QHP: CPT | Performed by: INTERNAL MEDICINE

## 2020-01-02 ENCOUNTER — ANTI-COAG VISIT (OUTPATIENT)
Dept: PHARMACY | Age: 80
End: 2020-01-02
Payer: MEDICARE

## 2020-01-02 LAB — INR BLD: 2.9

## 2020-01-02 PROCEDURE — 99211 OFF/OP EST MAY X REQ PHY/QHP: CPT

## 2020-01-02 PROCEDURE — 85610 PROTHROMBIN TIME: CPT

## 2020-02-06 ENCOUNTER — ANTI-COAG VISIT (OUTPATIENT)
Dept: PHARMACY | Age: 80
End: 2020-02-06
Payer: MEDICARE

## 2020-02-06 LAB — INR BLD: 2.1

## 2020-02-06 PROCEDURE — 85610 PROTHROMBIN TIME: CPT

## 2020-02-06 PROCEDURE — 99211 OFF/OP EST MAY X REQ PHY/QHP: CPT

## 2020-02-06 NOTE — PROGRESS NOTES
Ms. Thomas Dickens is here for management of anticoagulation for Afib. Patient was started on Warfarin on 8/6/15. PMH also significant for colonic polyp, and vertigo. She presents today w/out complaint. Pt verifies dosing regimen as listed above  No missed doses  Pt denies s/s bleeding/swelling/SOB. No changes in Rx/OTC/herbal medications. Still taking Glucosamine + chondroitin (2 once a day) and also meclizine. Takes APAP prn. No tobacco use. She reports that she drinks one ETOH drink about every night. INR 2.1 is within therapeutic range of 2-3. Recommend to continue 5 mg Mon/Wed/Fri; and 2.5 mg all other days. Patient has 5 mg tablets. Will continue to monitor and check INR in 4 weeks. Dosing reminder card given with phone number, appointment date and time.    Return to clinic: 3/5 @ 9:30 am   Referring MD: Dr. Sandhya Shelby

## 2020-03-05 ENCOUNTER — ANTI-COAG VISIT (OUTPATIENT)
Dept: PHARMACY | Age: 80
End: 2020-03-05
Payer: MEDICARE

## 2020-03-05 LAB — INR BLD: 2.7

## 2020-03-05 PROCEDURE — 99211 OFF/OP EST MAY X REQ PHY/QHP: CPT

## 2020-03-05 PROCEDURE — 85610 PROTHROMBIN TIME: CPT

## 2020-03-05 NOTE — PROGRESS NOTES
Ms. Jemima Kolb is here for management of anticoagulation for Afib. Patient was started on Warfarin on 8/6/15. PMH also significant for colonic polyp, and vertigo. She presents today w/out complaint. Pt verifies dosing regimen as listed above  No missed doses  Pt denies s/s bleeding/swelling/SOB. No changes in Rx/OTC/herbal medications. Still taking Glucosamine + chondroitin (2 once a day) and also meclizine. Takes APAP prn. No tobacco use. She reports that she drinks one ETOH drink about every night. Reports that she takes 1-2 Aleve here and there as needed. INR 2.7 is within therapeutic range of 2-3. Recommend to continue 5 mg Mon/Wed/Fri; and 2.5 mg all other days. Patient has 5 mg tablets. Will continue to monitor and check INR in 4 weeks. Dosing reminder card given with phone number, appointment date and time.    Return to clinic: 4/2 @ 9:45am   Referring MD: Dr. Gil Bonilla, PharmD 3/5/20 9:27 AM

## 2020-04-09 ENCOUNTER — ANTI-COAG VISIT (OUTPATIENT)
Dept: PHARMACY | Age: 80
End: 2020-04-09
Payer: MEDICARE

## 2020-04-09 LAB — INR BLD: 2

## 2020-04-09 PROCEDURE — 99211 OFF/OP EST MAY X REQ PHY/QHP: CPT

## 2020-04-09 PROCEDURE — 85610 PROTHROMBIN TIME: CPT

## 2020-04-09 PROCEDURE — 99212 OFFICE O/P EST SF 10 MIN: CPT

## 2020-05-07 ENCOUNTER — ANTI-COAG VISIT (OUTPATIENT)
Dept: PHARMACY | Age: 80
End: 2020-05-07
Payer: MEDICARE

## 2020-05-07 LAB — INR BLD: 2.2

## 2020-05-07 PROCEDURE — 85610 PROTHROMBIN TIME: CPT

## 2020-05-07 PROCEDURE — 99211 OFF/OP EST MAY X REQ PHY/QHP: CPT

## 2020-06-04 ENCOUNTER — ANTI-COAG VISIT (OUTPATIENT)
Dept: PHARMACY | Age: 80
End: 2020-06-04
Payer: MEDICARE

## 2020-06-04 LAB — INR BLD: 2

## 2020-06-04 PROCEDURE — 99211 OFF/OP EST MAY X REQ PHY/QHP: CPT

## 2020-06-04 PROCEDURE — 85610 PROTHROMBIN TIME: CPT

## 2020-06-09 RX ORDER — WARFARIN SODIUM 5 MG/1
5 TABLET ORAL DAILY
Qty: 90 TABLET | Refills: 1 | Status: SHIPPED | OUTPATIENT
Start: 2020-06-09

## 2020-07-02 ENCOUNTER — ANTI-COAG VISIT (OUTPATIENT)
Dept: PHARMACY | Age: 80
End: 2020-07-02
Payer: MEDICARE

## 2020-07-02 LAB — INR BLD: 2.2

## 2020-07-02 PROCEDURE — 99211 OFF/OP EST MAY X REQ PHY/QHP: CPT

## 2020-07-02 PROCEDURE — 85610 PROTHROMBIN TIME: CPT

## 2020-07-02 NOTE — PROGRESS NOTES
Ms. Kelsea Sandoval is here for management of anticoagulation for Afib. Patient was started on Warfarin on 8/6/15. PMH also significant for colonic polyp, and vertigo. She presents today w/out complaint. Pt verifies dosing regimen as listed above  No missed doses  Pt denies s/sx bleeding/swelling/SOB. No changes in Rx/OTC/herbal medications. Reports that she takes 1-2 Aleve here and there as needed. No tobacco use. She reports that she drinks one ETOH drink about every night. INR 2.2 is within therapeutic range of 2-3. Recommend to continue 5 mg Mon/Wed/Fri; and 2.5 mg all other days. Patient has 5 mg tablets. Will continue to monitor and check INR in 4 weeks. (She prefers not to push her appointment out any further if possible)  Dosing reminder card given with phone number, appointment date and time.    Return to clinic: 8/6 @ 0930am   Referring MD: Dr. Vasquez Neighbours: Jessica Francois: Yes  Total # of Interventions Recommended: 0  - Maintenance Safety Lab Monitoring #: 1    Total Interventions Accepted: 0  Time Spent (min): 15

## 2020-08-18 ENCOUNTER — ANTI-COAG VISIT (OUTPATIENT)
Dept: PHARMACY | Age: 80
End: 2020-08-18
Payer: MEDICARE

## 2020-08-18 LAB — INR BLD: 1.9

## 2020-08-18 PROCEDURE — 85610 PROTHROMBIN TIME: CPT | Performed by: FAMILY MEDICINE

## 2020-08-18 PROCEDURE — 99211 OFF/OP EST MAY X REQ PHY/QHP: CPT | Performed by: FAMILY MEDICINE

## 2020-09-01 ENCOUNTER — OFFICE VISIT (OUTPATIENT)
Dept: CARDIOLOGY CLINIC | Age: 80
End: 2020-09-01
Payer: MEDICARE

## 2020-09-01 VITALS
HEIGHT: 64 IN | OXYGEN SATURATION: 96 % | SYSTOLIC BLOOD PRESSURE: 130 MMHG | BODY MASS INDEX: 33.46 KG/M2 | WEIGHT: 196 LBS | DIASTOLIC BLOOD PRESSURE: 60 MMHG | HEART RATE: 76 BPM

## 2020-09-01 PROCEDURE — 93000 ELECTROCARDIOGRAM COMPLETE: CPT | Performed by: NURSE PRACTITIONER

## 2020-09-01 PROCEDURE — 99213 OFFICE O/P EST LOW 20 MIN: CPT | Performed by: NURSE PRACTITIONER

## 2020-09-01 NOTE — PROGRESS NOTES
[address]   Electrophysiology  Note              Date:  September 1, 2020  Patient name: Matheus Keys  YOB: 1940    Primary Care physician: Floyd Aggarwal MD    HISTORY OF PRESENT ILLNESS: The patient is a 67 y.o.  female with a history of symptomatic paroxysmal atrial fibrillation and vertigo. She was diagnosed with atrial fibrillation in 8/2015 after going to the ER with chest pain. She converted to sinus rhythm spontaneously. Echo in 8/2015 showed an EF of 55-60%. She has remained in sinus rhythm at subsequent visits. Today she is being seen for paroxysmal atrial fibrillation. EKG shows sinus rhythm with a heart rate of 70. She complains of fatigue and vertigo spells. Denies chest pain, palpitations, shortness of breath, and dizziness. Past Medical History:   has a past medical history of Atrial fibrillation [facility]), Colonic polyp, Paroxysmal atrial fibrillation ([facility] and Vertigo. Past Surgical History:   has a past surgical history that includes Tonsillectomy; Colonoscopy (07/31/2007); and joint replacement (04/2016). Home Medications:    Prior to Admission medications    Medication Sig Start Date End Date Taking? Authorizing Provider   warfarin (COUMADIN) 5 MG tablet Take 1 tablet by mouth daily  Patient taking differently: Take 5 mg by mouth daily 5 mg three times weekly 2.5 mg four times weekly 6/9/20  Yes Leopold Sahara, APRN - CNP   diltiazem (TIAZAC) 120 MG extended release capsule TAKE ONE CAPSULE BY MOUTH EVERY DAY 9/18/19  Yes Leopold Sahara, APRN - CNP   Glucosamine Sulfate 750 MG TABS Take 1 tablet by mouth daily. Yes Historical Provider, MD   Multiple Vitamin (MULTIVITAMIN PO) Take  by mouth. Yes Historical Provider, MD   LUTEIN PO Take  by mouth.      Yes Historical Provider, MD   meclizine (ANTIVERT) 25 MG tablet Take 25 mg by mouth 3 times daily as needed    Historical Provider, MD       Allergies:  Lyrica Antonio Francia thickening of leaflets of mitral valve.   Mild mitral regurgitation is present. Mild posterior mitral annulus calcification is present. No mitral stenosis.   Aortic valve appears sclerotic but opens adequately.   No evidence of aortic valve regurgitation.   Normal right ventricular size .   Left ventricular contractility appears normal But   TAPSE values reduced     CARDIOLOGY LABS:   CBC: No results for input(s): WBC, HGB, HCT, PLT in the last 72 hours. BMP: No results for input(s): NA, K, CO2, BUN, CREATININE, LABGLOM, GLUCOSE in the last 72 hours. PT/INR: No results for input(s): PROTIME, INR in the last 72 hours. APTT:No results for input(s): APTT in the last 72 hours. FASTING LIPID PANEL:  Lab Results   Component Value Date    HDL 32 08/06/2015    LDLCALC 82 08/06/2015    TRIG 66 08/06/2015     LIVER PROFILE:No results for input(s): AST, ALT, ALB in the last 72 hours. Assessment:   1. Symptomatic paroxysmal atrial fibrillation: stable   -diagnosed in 8/2015   -on Coumadin for [id number] score 3 (age and gender)  2. Vertigo: on meclizine  3. OA: s/p hip replacement in 4/2017    Plan:   1. Continue Cardizem and Coumadin (Clinic managing)  2. Annual CBC (due 1/2021)  3.  Follow up in one year     Brooks Alvarado, [address]  [phone]

## 2020-09-17 ENCOUNTER — ANTI-COAG VISIT (OUTPATIENT)
Dept: PHARMACY | Age: 80
End: 2020-09-17
Payer: MEDICARE

## 2020-09-17 LAB — INR BLD: 2.5

## 2020-09-17 PROCEDURE — 85610 PROTHROMBIN TIME: CPT

## 2020-09-17 PROCEDURE — 99211 OFF/OP EST MAY X REQ PHY/QHP: CPT

## 2020-09-24 RX ORDER — DILTIAZEM HYDROCHLORIDE 120 MG/1
CAPSULE, EXTENDED RELEASE ORAL
Qty: 90 CAPSULE | Refills: 3 | Status: SHIPPED | OUTPATIENT
Start: 2020-09-24 | End: 2021-02-03 | Stop reason: SINTOL

## 2020-10-14 ENCOUNTER — APPOINTMENT (OUTPATIENT)
Dept: GENERAL RADIOLOGY | Age: 80
End: 2020-10-14
Payer: MEDICARE

## 2020-10-14 ENCOUNTER — HOSPITAL ENCOUNTER (EMERGENCY)
Age: 80
Discharge: HOME | End: 2020-10-14
Attending: EMERGENCY MEDICINE
Payer: MEDICARE

## 2020-10-14 ENCOUNTER — APPOINTMENT (OUTPATIENT)
Dept: CT IMAGING | Age: 80
End: 2020-10-14
Payer: MEDICARE

## 2020-10-14 VITALS
DIASTOLIC BLOOD PRESSURE: 78 MMHG | TEMPERATURE: 98.4 F | SYSTOLIC BLOOD PRESSURE: 128 MMHG | BODY MASS INDEX: 34.6 KG/M2 | HEIGHT: 62 IN | HEART RATE: 70 BPM | WEIGHT: 188 LBS | OXYGEN SATURATION: 98 % | RESPIRATION RATE: 16 BRPM

## 2020-10-14 LAB
A/G RATIO: 1.4 (ref 1.1–2.2)
ALBUMIN SERPL-MCNC: 4.2 G/DL (ref 3.4–5)
ALP BLD-CCNC: 90 U/L (ref 40–129)
ALT SERPL-CCNC: 26 U/L (ref 10–40)
ANION GAP SERPL CALCULATED.3IONS-SCNC: 11 MMOL/L (ref 3–16)
AST SERPL-CCNC: 50 U/L (ref 15–37)
BASOPHILS ABSOLUTE: 0.1 K/UL (ref 0–0.2)
BASOPHILS RELATIVE PERCENT: 0.3 %
BILIRUB SERPL-MCNC: 0.3 MG/DL (ref 0–1)
BUN BLDV-MCNC: 14 MG/DL (ref 7–20)
CALCIUM SERPL-MCNC: 8.7 MG/DL (ref 8.3–10.6)
CHLORIDE BLD-SCNC: 102 MMOL/L (ref 99–110)
CO2: 22 MMOL/L (ref 21–32)
CREAT SERPL-MCNC: <0.5 MG/DL (ref 0.6–1.2)
EKG ATRIAL RATE: 77 BPM
EKG DIAGNOSIS: NORMAL
EKG P AXIS: 3 DEGREES
EKG P-R INTERVAL: 132 MS
EKG Q-T INTERVAL: 392 MS
EKG QRS DURATION: 78 MS
EKG QTC CALCULATION (BAZETT): 443 MS
EKG R AXIS: 14 DEGREES
EKG T AXIS: 31 DEGREES
EKG VENTRICULAR RATE: 77 BPM
EOSINOPHILS ABSOLUTE: 0 K/UL (ref 0–0.6)
EOSINOPHILS RELATIVE PERCENT: 0.1 %
GFR AFRICAN AMERICAN: >60
GFR NON-AFRICAN AMERICAN: >60
GLOBULIN: 3 G/DL
GLUCOSE BLD-MCNC: 113 MG/DL (ref 70–99)
HCT VFR BLD CALC: 42.7 % (ref 36–48)
HEMOGLOBIN: 14.5 G/DL (ref 12–16)
INR BLD: 2.28 (ref 0.86–1.14)
LYMPHOCYTES ABSOLUTE: 1.3 K/UL (ref 1–5.1)
LYMPHOCYTES RELATIVE PERCENT: 8.2 %
MCH RBC QN AUTO: 33.2 PG (ref 26–34)
MCHC RBC AUTO-ENTMCNC: 34 G/DL (ref 31–36)
MCV RBC AUTO: 97.7 FL (ref 80–100)
MONOCYTES ABSOLUTE: 1.1 K/UL (ref 0–1.3)
MONOCYTES RELATIVE PERCENT: 6.5 %
NEUTROPHILS ABSOLUTE: 13.9 K/UL (ref 1.7–7.7)
NEUTROPHILS RELATIVE PERCENT: 84.9 %
PDW BLD-RTO: 13.2 % (ref 12.4–15.4)
PLATELET # BLD: 229 K/UL (ref 135–450)
PMV BLD AUTO: 7.7 FL (ref 5–10.5)
POTASSIUM REFLEX MAGNESIUM: 6.1 MMOL/L (ref 3.5–5.1)
POTASSIUM SERPL-SCNC: 3.8 MMOL/L (ref 3.5–5.1)
PROTHROMBIN TIME: 26.7 SEC (ref 10–13.2)
RBC # BLD: 4.37 M/UL (ref 4–5.2)
SODIUM BLD-SCNC: 135 MMOL/L (ref 136–145)
TOTAL PROTEIN: 7.2 G/DL (ref 6.4–8.2)
TROPONIN: <0.01 NG/ML
WBC # BLD: 16.4 K/UL (ref 4–11)

## 2020-10-14 PROCEDURE — 85025 COMPLETE CBC W/AUTO DIFF WBC: CPT

## 2020-10-14 PROCEDURE — 6370000000 HC RX 637 (ALT 250 FOR IP): Performed by: NURSE PRACTITIONER

## 2020-10-14 PROCEDURE — 70450 CT HEAD/BRAIN W/O DYE: CPT

## 2020-10-14 PROCEDURE — 73200 CT UPPER EXTREMITY W/O DYE: CPT

## 2020-10-14 PROCEDURE — 93010 ELECTROCARDIOGRAM REPORT: CPT | Performed by: INTERNAL MEDICINE

## 2020-10-14 PROCEDURE — 72125 CT NECK SPINE W/O DYE: CPT

## 2020-10-14 PROCEDURE — 73060 X-RAY EXAM OF HUMERUS: CPT

## 2020-10-14 PROCEDURE — 84484 ASSAY OF TROPONIN QUANT: CPT

## 2020-10-14 PROCEDURE — 36415 COLL VENOUS BLD VENIPUNCTURE: CPT

## 2020-10-14 PROCEDURE — 73090 X-RAY EXAM OF FOREARM: CPT

## 2020-10-14 PROCEDURE — 80053 COMPREHEN METABOLIC PANEL: CPT

## 2020-10-14 PROCEDURE — 93005 ELECTROCARDIOGRAM TRACING: CPT | Performed by: NURSE PRACTITIONER

## 2020-10-14 PROCEDURE — 84132 ASSAY OF SERUM POTASSIUM: CPT

## 2020-10-14 PROCEDURE — 85610 PROTHROMBIN TIME: CPT

## 2020-10-14 PROCEDURE — 29105 APPLICATION LONG ARM SPLINT: CPT

## 2020-10-14 PROCEDURE — 71045 X-RAY EXAM CHEST 1 VIEW: CPT

## 2020-10-14 PROCEDURE — 99284 EMERGENCY DEPT VISIT MOD MDM: CPT

## 2020-10-14 RX ORDER — OXYCODONE HYDROCHLORIDE AND ACETAMINOPHEN 5; 325 MG/1; MG/1
1 TABLET ORAL ONCE
Status: COMPLETED | OUTPATIENT
Start: 2020-10-14 | End: 2020-10-14

## 2020-10-14 RX ORDER — OXYCODONE HYDROCHLORIDE AND ACETAMINOPHEN 5; 325 MG/1; MG/1
1 TABLET ORAL EVERY 8 HOURS PRN
Qty: 12 TABLET | Refills: 0 | Status: SHIPPED | OUTPATIENT
Start: 2020-10-14 | End: 2020-10-19

## 2020-10-14 RX ADMIN — OXYCODONE HYDROCHLORIDE AND ACETAMINOPHEN 1 TABLET: 5; 325 TABLET ORAL at 12:13

## 2020-10-14 RX ADMIN — OXYCODONE HYDROCHLORIDE AND ACETAMINOPHEN 1 TABLET: 5; 325 TABLET ORAL at 15:27

## 2020-10-14 ASSESSMENT — PAIN DESCRIPTION - LOCATION: LOCATION: ELBOW

## 2020-10-14 ASSESSMENT — PAIN SCALES - GENERAL
PAINLEVEL_OUTOF10: 6
PAINLEVEL_OUTOF10: 6
PAINLEVEL_OUTOF10: 4
PAINLEVEL_OUTOF10: 6

## 2020-10-14 ASSESSMENT — PAIN DESCRIPTION - DESCRIPTORS: DESCRIPTORS: CONSTANT

## 2020-10-14 ASSESSMENT — PAIN DESCRIPTION - PAIN TYPE: TYPE: ACUTE PAIN

## 2020-10-14 ASSESSMENT — PAIN DESCRIPTION - PROGRESSION: CLINICAL_PROGRESSION: GRADUALLY IMPROVING

## 2020-10-14 ASSESSMENT — PAIN DESCRIPTION - ORIENTATION: ORIENTATION: RIGHT

## 2020-10-14 NOTE — ED NOTES
Bed: 31  Expected date:   Expected time:   Means of arrival:   Comments:   [address] [facility]  10/14/20 [id number]

## 2020-10-14 NOTE — ED PROVIDER NOTES
I independently performed a history and physical on Jeremías Sharma. All diagnostic, treatment, and disposition decisions were made by myself in conjunction with the advanced practice provider.     -Jeremías Sharma is a 67 y.o. female with hx of paroxysmal afib (on warfarin) presents to ED sp fall. Patien states she picked up a 12 pack of coke and was carrying it into the kitchen. Was walking down the stairs (~3 steps) onto the landing. Does not know what caused her to fall, but denies lightheadedness, dizziness, cp, sob, palpitations piror to fall. Denies tripping, legs giving out, or LOC. Currently reports right elbow. Denies neck/head/back pain. -PE: well appearing, nontoxic, not in acute distress. RRR, CTAB/l, no w/r/r, abdomen soft, ND, NTTP, + BS x 4, no rigidity, rebound, guarding, full range of motion of neck with no tenderness. 2+ left radial pulse, swelling, tenderness to distal right humerus.   -lab workup significant for: Leukocytosis of 16.4  -CT head/C spine: No aCute intracranial abnormality. No acute abnormality of the cervical spine  -Xray of right humerus and forearm: Negative for fracture of the radius and ulna. Oblique fracture of the distal humeral metadiaphysis  -CT humerus shows acute displaced oblique fracture of the distal humeral metaphysis. No articular surface involvement identified. Severe severe glenohumeral degenerative changes. Findings compatible clinic rotator cuff tears  -The Ekg interpreted by me shows  normal sinus rhythm with a rate of 77  Axis is   Normal  QTc is  443  Intervals and Durations are unremarkable. ST Segments: no acute change  No significant change from prior EKG dated 9/1/20  -Orthopedic surgery was consulted, recommends patient be put in a posterior splint. And would offer patient admission and can be repaired tomorrow in the OR.   CHELY Richter spoke with patient who states that she would rather be discharged home to follow-up outpatient. Discharged with pain medication, proper referral and strict ED return precautions given for new/worsending symptoms. Patient and family in agreement with plan, verbally confirm understanding and have no further questions/concerns. For further details of [facility] emergency department encounter, please see CHELY Moe documentation.         Dio Moreland MD  10/14/20 7232

## 2020-10-14 NOTE — ED PROVIDER NOTES
[facility] Emergency Department    CHIEF COMPLAINT  Fall (patient states she was carrying a 12 pack of coke into the kitchen. states she lost her balance and fell down three steps landing on her right side. pt denies any LOC states her only complaint is right elbow pain ) and Elbow Pain      SHARED SERVICE VISIT  I have seen and evaluated this patient with my supervising physician, Dr. Kathryn Benitez. HISTORY OF PRESENT ILLNESS  Derrick Abreu is a 67 y.o. female with a history of atrial fibrillation who presents to the ED complaining of with a history of atrial fibrillation on for unknown reason. States that she was carrying a 12 pack of Coke into the kitchen and \"I was all of a sudden down 3 steps and was downstairs\". She complains of \"aching\" 6/10 right elbow pain with decreased ROM. Patient's  did not fall but states he hurt it and it was immediately at her side and states that she was awake and alert, did not have slurred speech, facial droop, or confusion but immediately said \"I think I broke my arm\". She denies chest pain, nausea, vomiting, dizziness, shortness of breath, unilateral/generalized weakness, head injury, headache, neck/back pain, shoulder pain, hip pain, or other injuries. No other complaints, modifying factors or associated symptoms. Nursing notes reviewed.    Past Medical History:   Diagnosis Date    Atrial fibrillation ([address]     Colonic polyp 07/31/2007    Paroxysmal atrial fibrillation ([address] 8/6/2015    Vertigo      Past Surgical History:   Procedure Laterality Date    COLONOSCOPY  07/31/2007    JOINT REPLACEMENT  04/2016    TONSILLECTOMY       Family History   Problem Relation Age of Onset    Colon Cancer Mother 61     Social History     Socioeconomic History    Marital status:      Spouse name: Not on file    Number of children: Not on file    Years of education: Not on file    Highest education level: Not on file Occupational History    Not on file   Social Needs    Financial resource strain: Not on file    Food insecurity     Worry: Not on file     Inability: Not on file    Transportation needs     Medical: Not on file     Non-medical: Not on file   Tobacco Use    Smoking status: Never Smoker    Smokeless tobacco: Never Used   Substance and Sexual Activity    Alcohol use: Yes     Alcohol/week: 14.0 standard drinks     Types: 14 Shots of liquor per week     Comment: occ    Drug use: No    Sexual activity: Yes   Lifestyle    Physical activity     Days per week: Not on file     Minutes per session: Not on file    Stress: Not on file   Relationships    Social connections     Talks on phone: Not on file     Gets together: Not on file     Attends Holiness service: Not on file     Active member of club or organization: Not on file     Attends meetings of clubs or organizations: Not on file     Relationship status: Not on file    Intimate partner violence     Fear of current or ex partner: Not on file     Emotionally abused: Not on file     Physically abused: Not on file     Forced sexual activity: Not on file   Other Topics Concern    Not on file   Social History Narrative    Not on file     No current facility-administered medications for this encounter. Current Outpatient Medications   Medication Sig Dispense Refill    dilTIAZem (TIADYLT ER) 120 MG extended release capsule TAKE 1 CAPSULE BY MOUTH EVERY DAY 90 capsule 3    warfarin (COUMADIN) 5 MG tablet Take 1 tablet by mouth daily (Patient taking differently: Take 5 mg by mouth daily 5 mg three times weekly 2.5 mg four times weekly) 90 tablet 1    meclizine (ANTIVERT) 25 MG tablet Take 25 mg by mouth 3 times daily as needed      Glucosamine Sulfate 750 MG TABS Take 1 tablet by mouth daily.  Multiple Vitamin (MULTIVITAMIN PO) Take  by mouth.  LUTEIN PO Take  by mouth.          Allergies   Allergen Reactions    Lyrica [Pregabalin] Other (See Comments)     fainted       REVIEW OF SYSTEMS  10 systems reviewed, pertinent positives per HPI otherwise noted to be negative    PHYSICAL EXAM  /72   Pulse 77   Temp 98.4 °F (36.9 °C) (Oral)   Resp 16   Ht 5' 2\" (1.575 m)   Wt 188 lb (85.3 kg)   SpO2 95%   BMI 34.39 kg/m²   GENERAL APPEARANCE: Awake and alert. Cooperative. Mild distress. Non-Toxic in appearance. HEAD: Normocephalic. Atraumatic. EYES: PERRL. EOM's grossly intact. ENT: Mucous membranes are moist.   NECK: Supple. HEART: RRR. No murmurs. LUNGS: Respirations unlabored. CTAB. Good air exchange. Speaking comfortably in full sentences. ABDOMEN: Soft. Non-distended. Non-tender. No guarding or rebound. No masses. No organomegaly. Back: There is no midline cervical, thoracic, or lumbar spine tenderness. EXTREMITIES: No peripheral edema.  + RUE decreased ROM. All extremities neurovascularly intact.  + Equal radial pulses bilaterally. + Full sensation and equal hand strength bilaterally.  + Brisk cap refill < 2 seconds. Extremities warm, pink, dry-well-perfused and neurovascularly intact. SKIN: Warm and dry. No acute rashes. NEUROLOGICAL: Alert and oriented. CN's 2-12 intact. No gross facial drooping. Strength 5/5, sensation intact. PSYCHIATRIC: Normal mood and affect. RADIOLOGY  No results found. ED COURSE  Patient received Percocet for pain, with good relief. A posterior splint was placed. Triage vitals stable. Labs Ordered:  CBC: Positive leukocytosis WBC 16.4, neutrophils absolute 13.9; otherwise unremarkable  CMP: Sodium 135, potassium hemolyzed, glucose 113, creatinine <0.5, AST 50; otherwise unremarkable  Troponin: <0.01  Urinalysis reflex to culture :pending  Potassium level: 3.8  PTT/INR:26.7 and 2.28 respectively    Imaging ordered:  XR right radius/ulna: negative for fracture of radius or ulna. XR right humerus: Oblique fracture of the distal humeral  metadiaphysis.   CXR: No active cardiopulmonary disease. CT head:No acure cardiopulmonary disease. CT cervical spine: no acute abnormality of the cervical spine. Consulted Dr. Ava Pickett - orthopedic surgeon. Discussed patient's HPI, ED work-up, results, and treatment. Dr. Ava Pickett recommends CT in ED, application of long posterior splint, and follow-up in office but calling to schedule appointment for early next week or if patient wishes she can elect to be admitted to the hospital and have surgery either Thursday or Friday. These options given to patient who elects to [facility] and follow-up next week\". EKG: As interpreted by EMD. Normal sinus rhythm. Interventions:  Patient was given Percocet for pain. Reevaluation:  Patient reports improvement of her symptoms s/p pain medication. A discussion was had with Mrs. Marshal Foster regarding closed right distal humerus fracture. Risk management discussed and shared decision making had with patient and/or surrogate. All questions were answered. Patient will follow up with PCP and  othopedics for further evaluation/treatment. All questions answered. Patient will return to ED for new/worsening symptoms. Patient is agreeable with this plan. Patient was sent home with a prescription for percocet. CRITICAL CARE TIME  0 Minutes of critical care time spent not including separately billable procedures. MDM  Patient presents to ED with right distal humerus fracture  . Alternate diagnoses are less likely based on history and physical. Considered fracture/dislocation of radius/ulna but less likely as there is no radiographic evidence of these findings. Considered cervical spine fracture/dislocation but less likely as no radiographic evidence of these finding on CT of c-spine. Considered intracranial bleed as patient is on coumadin and experienced a fall but less likely as imaging reveals no acute bleed/abnormality.  Considered CVA but less likely as no unilateral weakness, slurred speech, facial droop or confusion prior to or immediately following fall-confirmed by  who heard her fall and was immediately at her bedside. Considered MI but less likely as no elevation in troponin or ischemic changes on EKG. S/p consult with ortho-Dr. Tabatha Costa and my attending physician Dr. Griffin Winters, I feel that the patient is safe and appropriate for discharge with strict return precaution in the next 12-24 hours for loss of strength/sensation, cold extremity, numbness and tingling of extremity, increased pain of extremity not relieved by pain medication, shortness of breath, or other concerns.         Clinical Impression:  Closed fracture of distal end of right humerus      DISPOSITION  discharged       JENNIFER Arteaga - CNP  10/16/20 8906

## 2020-10-14 NOTE — ED NOTES
Discharge instructions reviewed with Pt. Pt verbalizes understanding at this time. Prescriptions/medications reviewed with pt at this time Pt condition stable at this time. No concerns voiced.       Dilia Carballo RN  10/14/20 7834

## 2020-10-14 NOTE — ED NOTES
1mg Dilaudid pulled in error under this patient, unable to waste in [place], waste witnessed by MARIBEL Trimble RN  10/14/20 1226

## 2020-10-15 ENCOUNTER — TELEPHONE (OUTPATIENT)
Dept: ORTHOPEDIC SURGERY | Age: 80
End: 2020-10-15

## 2020-10-15 ENCOUNTER — TELEPHONE (OUTPATIENT)
Dept: CARDIOLOGY CLINIC | Age: 80
End: 2020-10-15

## 2020-10-15 PROBLEM — S42.331A CLOSED DISPLACED OBLIQUE FRACTURE OF SHAFT OF RIGHT HUMERUS: Status: ACTIVE | Noted: 2020-10-15

## 2020-10-15 NOTE — TELEPHONE ENCOUNTER
Patient called stating she fell yesterday and broke her right arm. Patient is scheduled for surgery on 10/20 and needs to stop her coumadin today.  Patient requesting to speak with npbb today

## 2020-10-16 ENCOUNTER — OFFICE VISIT (OUTPATIENT)
Dept: PRIMARY CARE CLINIC | Age: 80
End: 2020-10-16
Payer: MEDICARE

## 2020-10-16 PROCEDURE — 99211 OFF/OP EST MAY X REQ PHY/QHP: CPT | Performed by: NURSE PRACTITIONER

## 2020-10-16 NOTE — PROGRESS NOTES
Lucinda Mcnally received a viral test for COVID-19. They were educated on isolation and quarantine as appropriate. For any symptoms, they were directed to seek care from their PCP, given contact information to establish with a doctor, directed to an urgent care or the emergency room.

## 2020-10-16 NOTE — PATIENT INSTRUCTIONS
Advance Care Planning  People with COVID-19 may have no symptoms, mild symptoms, such as fever, cough, and shortness of breath or they may have more severe illness, developing severe and fatal pneumonia. As a result, Advance Care Planning with attention to naming a health care decision maker (someone you trust to make healthcare decisions for you if you could not speak for yourself) and sharing other health care preferences is important BEFORE a possible health crisis. Please contact your Primary Care Provider to discuss Advance Care Planning. Preventing the Spread of Coronavirus Disease 2019 in Homes and Residential Communities  For the most recent information go to [email or web address]    Prevention steps for People with confirmed or suspected COVID-19 (including persons under investigation) who do not need to be hospitalized  and   People with confirmed COVID-19 who were hospitalized and determined to be medically stable to go home    Your healthcare provider and public health staff will evaluate whether you can be cared for at home. If it is determined that you do not need to be hospitalized and can be isolated at home, you will be monitored by staff from your local or state health department. You should follow the prevention steps below until a healthcare provider or local or state health department says you can return to your normal activities. Stay home except to get medical care  People who are mildly ill with COVID-19 are able to isolate at home during their illness. You should restrict activities outside your home, except for getting medical care. Do not go to work, school, or public areas. Avoid using public transportation, ride-sharing, or taxis. Separate yourself from other people and animals in your home  People: As much as possible, you should stay in a specific room and away from other people in your home.  Also, you should use a separate bathroom, if available. Animals: You should restrict contact with pets and other animals while you are sick with COVID-19, just like you would around other people. Although there have not been reports of pets or other animals becoming sick with COVID-19, it is still recommended that people sick with COVID-19 limit contact with animals until more information is known about the virus. When possible, have another member of your household care for your animals while you are sick. If you are sick with COVID-19, avoid contact with your pet, including petting, snuggling, being kissed or licked, and sharing food. If you must care for your pet or be around animals while you are sick, wash your hands before and after you interact with pets and wear a facemask. Call ahead before visiting your doctor  If you have a medical appointment, call the healthcare provider and tell them that you have or may have COVID-19. This will help the healthcare providers office take steps to keep other people from getting infected or exposed. Wear a facemask  You should wear a facemask when you are around other people (e.g., sharing a room or vehicle) or pets and before you enter a healthcare providers office. If you are not able to wear a facemask (for example, because it causes trouble breathing), then people who live with you should not stay in the same room with you, or they should wear a facemask if they enter your room. Cover your coughs and sneezes  Cover your mouth and nose with a tissue when you cough or sneeze. Throw used tissues in a lined trash can. Immediately wash your hands with soap and water for at least 20 seconds or, if soap and water are not available, clean your hands with an alcohol-based hand  that contains at least 60% alcohol.   Clean your hands often  Wash your hands often with soap and water for at least 20 seconds, especially after blowing your nose, coughing, or sneezing; going to the bathroom; and have a medical emergency and need to call 911, notify the dispatch personnel that you have, or are being evaluated for COVID-19. If possible, put on a facemask before emergency medical services arrive. Discontinuing home isolation  Patients with confirmed COVID-19 should remain under home isolation precautions until the risk of secondary transmission to others is thought to be low. The decision to discontinue home isolation precautions should be made on a case-by-case basis, in consultation with healthcare providers and state and local health departments.

## 2020-10-17 LAB — SARS-COV-2, NAA: NOT DETECTED

## 2020-10-19 ENCOUNTER — OFFICE VISIT (OUTPATIENT)
Dept: ORTHOPEDIC SURGERY | Age: 80
End: 2020-10-19
Payer: MEDICARE

## 2020-10-19 ENCOUNTER — ANESTHESIA EVENT (OUTPATIENT)
Dept: OPERATING ROOM | Age: 80
End: 2020-10-19
Payer: MEDICARE

## 2020-10-19 VITALS — BODY MASS INDEX: 34.6 KG/M2 | HEIGHT: 62 IN | WEIGHT: 188 LBS

## 2020-10-19 PROCEDURE — 99203 OFFICE O/P NEW LOW 30 MIN: CPT | Performed by: ORTHOPAEDIC SURGERY

## 2020-10-19 PROCEDURE — L3660 SO 8 AB RSTR CAN/WEB PRE OTS: HCPCS | Performed by: ORTHOPAEDIC SURGERY

## 2020-10-19 NOTE — PROGRESS NOTES
Obstructive Sleep Apnea (ERIC) Screening     Patient:  Rio Pace    YOB: 1940      Medical Record #:  [id number]                     Date:  10/19/2020     1. Are you a loud and/or regular snorer? []  Yes       [x] No    2. Have you been observed to gasp or stop breathing during sleep? []  Yes       [x] No    3. Do you feel tired or groggy upon awakening or do you awaken with a headache?           []  Yes       [] No    4. Are you often tired or fatigued during the wake time hours? []  Yes       [] No    5. Do you fall asleep sitting, reading, watching TV or driving? []  Yes       [] No    6. Do you often have problems with memory or concentration? []  Yes       [] No    **If patient's score is ? 3 they are considered high risk for ERIC. An Anesthesia provider will evaluate the patient and develop a plan of care the day of surgery. Note:  If the patient's BMI is more than 35 kg m¯² , has neck circumference > 40 cm, and/or high blood pressure the risk is greater (© American Sleep Apnea Association, 2006).

## 2020-10-19 NOTE — PROGRESS NOTES
distal humerus fracture. Impression:  Encounter Diagnosis   Name Primary?  Closed displaced oblique fracture of shaft of right humerus with routine healing, subsequent encounter Yes       Office Procedures:  Orders Placed This Encounter   Procedures    [facility] Shoulder Immobilizer Sling     Patient was prescribed a DJO Shoulder Immobilizer Sling. The right shoulder will require stabilization / immobilization from this orthosis. The orthosis will assist in protecting the affected area, provide functional support and facilitate healing. The patient was educated and fit by a healthcare professional with expert knowledge and specialization in brace application while under the direct supervision of the treating physician. Verbal and written instructions for the use of and application of this item were provided. They were instructed to contact the office immediately should the brace result in increased pain, decreased sensation, increased swelling or worsening of the condition. Treatment Plan:      Discussion with the patient and her . In fact called the  last week to discuss further everything with them and make sure they did not want to get this fixed as an inpatient but I think it is reasonable in the context to give this a couple days to be able to do it as an outpatient. I do think this fracture is most appropriate with surgical treatment the patient has discontinued her anticoagulant under the care of her primary care physician. Will be okay to resume that on postoperative day 1. I had a lengthy discussion with her regarding the risk benefits and alternatives to surgical treatment of this. In addition to all the risks below the largest risks that I believe that she has stiffness and some loss of strength in the triceps regardless of surgical approach. Furthermore there is a high risk of radial nerve palsy despite best surgical practices in these cases.   Other risks include but are not limited to nonunion, malunion, wound healing complication, bleeding, damage neurovascular structures, need for further procedures, DVT, PE, death, failure to leave all symptoms. Despite these risk the patient elected to proceed with surgery. We will plan for surgical treatment of this fracture tomorrow afternoon.   She will be nonweightbearing but we will get her moving in the immediate postoperative period

## 2020-10-19 NOTE — PROGRESS NOTES
Coletta Pandy    Age 67 y.o.    female    1940    MRN [id number]    10/20/2020  Arrival Time_____________  OR Time____________[address]     Procedure(s):  OPEN REDUCTION INTERNAL FIXATION RIGHT DISTAL HUMERUS FRACTURE                      General    Surgeon(s):  Clarine Mercury, MD       Phone [phone] ([place])     [address]  Cell Work  _________________________________________________________________  _________________________________________________________________  _________________________________________________________________  _________________________________________________________________  _________________________________________________________________      PCP _____________________________ Phone_________________       H&P__________________Bringing    Chart            Epic  DOS     Called_______  EKG__________________Bringing    Chart            Epic  DOS     Called_______  LAB__________________ Bringing    Chart            Epic  DOS     Called_______  Cardiac Clearance_______Bringing    Chart            Epic      DOS       Called_______    Cardiologist________________________ Phone___________________________      ? Oriental orthodox concerns / Waiver on Chart            PAT Communications_____________  ? Pre-op Instructions Given Mohamud Rater          ______________________________  ? Directions to Surgery Center                          ______________________________  ? Transportation Home_______________      _______________________________  ?  Crutches/Walker__________________        _______________________________      ________Pre-op Orders   _______Transcribed    _______Wt.  ________Pharmacy          _______SCD  ______VTE     ______Beta Blocker  ________Consent             ________TED Darylene Simple

## 2020-10-19 NOTE — RESULT ENCOUNTER NOTE

## 2020-10-20 ENCOUNTER — HOSPITAL ENCOUNTER (OUTPATIENT)
Dept: GENERAL RADIOLOGY | Age: 80
Discharge: HOME | End: 2020-10-20
Payer: MEDICARE

## 2020-10-20 ENCOUNTER — ANESTHESIA (OUTPATIENT)
Dept: OPERATING ROOM | Age: 80
End: 2020-10-20
Payer: MEDICARE

## 2020-10-20 ENCOUNTER — HOSPITAL ENCOUNTER (OUTPATIENT)
Age: 80
Setting detail: OBSERVATION
Discharge: HOME | End: 2020-10-21
Attending: ORTHOPAEDIC SURGERY | Admitting: ORTHOPAEDIC SURGERY
Payer: MEDICARE

## 2020-10-20 VITALS
DIASTOLIC BLOOD PRESSURE: 78 MMHG | SYSTOLIC BLOOD PRESSURE: 120 MMHG | RESPIRATION RATE: 24 BRPM | OXYGEN SATURATION: 99 %

## 2020-10-20 PROBLEM — Z87.81 H/O FRACTURE OF HUMERUS: Status: ACTIVE | Noted: 2020-10-20

## 2020-10-20 LAB
INR BLD: 1.04 (ref 0.86–1.14)
PROTHROMBIN TIME: 12.1 SEC (ref 10–13.2)

## 2020-10-20 PROCEDURE — 36415 COLL VENOUS BLD VENIPUNCTURE: CPT

## 2020-10-20 PROCEDURE — 3700000000 HC ANESTHESIA ATTENDED CARE: Performed by: ORTHOPAEDIC SURGERY

## 2020-10-20 PROCEDURE — 7100000001 HC PACU RECOVERY - ADDTL 15 MIN: Performed by: ORTHOPAEDIC SURGERY

## 2020-10-20 PROCEDURE — 2720000010 HC SURG SUPPLY STERILE: Performed by: ORTHOPAEDIC SURGERY

## 2020-10-20 PROCEDURE — 6360000002 HC RX W HCPCS: Performed by: NURSE ANESTHETIST, CERTIFIED REGISTERED

## 2020-10-20 PROCEDURE — 85610 PROTHROMBIN TIME: CPT

## 2020-10-20 PROCEDURE — C1713 ANCHOR/SCREW BN/BN,TIS/BN: HCPCS | Performed by: ORTHOPAEDIC SURGERY

## 2020-10-20 PROCEDURE — 2709999900 HC NON-CHARGEABLE SUPPLY: Performed by: ORTHOPAEDIC SURGERY

## 2020-10-20 PROCEDURE — 6360000002 HC RX W HCPCS: Performed by: ORTHOPAEDIC SURGERY

## 2020-10-20 PROCEDURE — 94761 N-INVAS EAR/PLS OXIMETRY MLT: CPT

## 2020-10-20 PROCEDURE — 3209999900 FLUORO FOR SURGICAL PROCEDURES

## 2020-10-20 PROCEDURE — 2580000003 HC RX 258: Performed by: ANESTHESIOLOGY

## 2020-10-20 PROCEDURE — 2580000003 HC RX 258: Performed by: ORTHOPAEDIC SURGERY

## 2020-10-20 PROCEDURE — P9045 ALBUMIN (HUMAN), 5%, 250 ML: HCPCS | Performed by: NURSE ANESTHETIST, CERTIFIED REGISTERED

## 2020-10-20 PROCEDURE — 6360000002 HC RX W HCPCS

## 2020-10-20 PROCEDURE — 3700000001 HC ADD 15 MINUTES (ANESTHESIA): Performed by: ORTHOPAEDIC SURGERY

## 2020-10-20 PROCEDURE — 6370000000 HC RX 637 (ALT 250 FOR IP): Performed by: PHYSICIAN ASSISTANT

## 2020-10-20 PROCEDURE — 2500000003 HC RX 250 WO HCPCS

## 2020-10-20 PROCEDURE — 2500000003 HC RX 250 WO HCPCS: Performed by: NURSE ANESTHETIST, CERTIFIED REGISTERED

## 2020-10-20 PROCEDURE — 7100000010 HC PHASE II RECOVERY - FIRST 15 MIN: Performed by: ORTHOPAEDIC SURGERY

## 2020-10-20 PROCEDURE — 7100000000 HC PACU RECOVERY - FIRST 15 MIN: Performed by: ORTHOPAEDIC SURGERY

## 2020-10-20 PROCEDURE — 3600000014 HC SURGERY LEVEL 4 ADDTL 15MIN: Performed by: ORTHOPAEDIC SURGERY

## 2020-10-20 PROCEDURE — 73060 X-RAY EXAM OF HUMERUS: CPT

## 2020-10-20 PROCEDURE — 2580000003 HC RX 258: Performed by: PHYSICIAN ASSISTANT

## 2020-10-20 PROCEDURE — G0378 HOSPITAL OBSERVATION PER HR: HCPCS

## 2020-10-20 PROCEDURE — 7100000011 HC PHASE II RECOVERY - ADDTL 15 MIN: Performed by: ORTHOPAEDIC SURGERY

## 2020-10-20 PROCEDURE — 2700000000 HC OXYGEN THERAPY PER DAY

## 2020-10-20 PROCEDURE — 3600000004 HC SURGERY LEVEL 4 BASE: Performed by: ORTHOPAEDIC SURGERY

## 2020-10-20 DEVICE — SCREW BNE L16MM DIA2.7MM ANK S STL ST VAR ANG LOK FULL THRD: Type: IMPLANTABLE DEVICE | Status: FUNCTIONAL

## 2020-10-20 DEVICE — SCREW BNE L24MM DIA3.5MM CORT S STL ST NONCANNULATED LOK: Type: IMPLANTABLE DEVICE | Status: FUNCTIONAL

## 2020-10-20 DEVICE — SCREW BNE L14MM DIA2.7MM ANK S STL ST VAR ANG LOK FULL THRD: Type: IMPLANTABLE DEVICE | Status: FUNCTIONAL

## 2020-10-20 DEVICE — IMPLANTABLE DEVICE: Type: IMPLANTABLE DEVICE | Status: FUNCTIONAL

## 2020-10-20 DEVICE — GRAFT BNE SUB M 5ML CANC DBM FRMBL CELLULAR VIVIGEN: Type: IMPLANTABLE DEVICE | Status: FUNCTIONAL

## 2020-10-20 DEVICE — IMPLANTABLE DEVICE: Type: IMPLANTABLE DEVICE | Site: HUMERUS | Status: FUNCTIONAL

## 2020-10-20 DEVICE — SCREW BNE L18MM DIA2.7MM ANK S STL ST VAR ANG LOK FULL THRD: Type: IMPLANTABLE DEVICE | Status: FUNCTIONAL

## 2020-10-20 DEVICE — SCREW BNE L26MM DIA3.5MM CORT S STL ST NONCANNULATED LOK: Type: IMPLANTABLE DEVICE | Status: FUNCTIONAL

## 2020-10-20 RX ORDER — SODIUM CHLORIDE 0.9 % (FLUSH) 0.9 %
10 SYRINGE (ML) INJECTION PRN
Status: DISCONTINUED | OUTPATIENT
Start: 2020-10-20 | End: 2020-10-21 | Stop reason: HOSPADM

## 2020-10-20 RX ORDER — ERGOCALCIFEROL 1.25 MG/1
50000 CAPSULE ORAL WEEKLY
Qty: 12 CAPSULE | Refills: 0 | Status: SHIPPED | OUTPATIENT
Start: 2020-10-20 | End: 2022-03-09

## 2020-10-20 RX ORDER — ONDANSETRON 2 MG/ML
INJECTION INTRAMUSCULAR; INTRAVENOUS PRN
Status: DISCONTINUED | OUTPATIENT
Start: 2020-10-20 | End: 2020-10-20 | Stop reason: SDUPTHER

## 2020-10-20 RX ORDER — MORPHINE SULFATE 2 MG/ML
2 INJECTION, SOLUTION INTRAMUSCULAR; INTRAVENOUS
Status: DISCONTINUED | OUTPATIENT
Start: 2020-10-20 | End: 2020-10-21 | Stop reason: HOSPADM

## 2020-10-20 RX ORDER — ACETAMINOPHEN 325 MG/1
650 TABLET ORAL EVERY 6 HOURS
Status: DISCONTINUED | OUTPATIENT
Start: 2020-10-20 | End: 2020-10-21 | Stop reason: HOSPADM

## 2020-10-20 RX ORDER — PROPOFOL 10 MG/ML
INJECTION, EMULSION INTRAVENOUS PRN
Status: DISCONTINUED | OUTPATIENT
Start: 2020-10-20 | End: 2020-10-20 | Stop reason: SDUPTHER

## 2020-10-20 RX ORDER — LIDOCAINE HYDROCHLORIDE 10 MG/ML
0.3 INJECTION, SOLUTION EPIDURAL; INFILTRATION; INTRACAUDAL; PERINEURAL
Status: DISCONTINUED | OUTPATIENT
Start: 2020-10-20 | End: 2020-10-20 | Stop reason: HOSPADM

## 2020-10-20 RX ORDER — ONDANSETRON 2 MG/ML
4 INJECTION INTRAMUSCULAR; INTRAVENOUS PRN
Status: DISCONTINUED | OUTPATIENT
Start: 2020-10-20 | End: 2020-10-20 | Stop reason: HOSPADM

## 2020-10-20 RX ORDER — HYDRALAZINE HYDROCHLORIDE 20 MG/ML
5 INJECTION INTRAMUSCULAR; INTRAVENOUS EVERY 10 MIN PRN
Status: DISCONTINUED | OUTPATIENT
Start: 2020-10-20 | End: 2020-10-20 | Stop reason: HOSPADM

## 2020-10-20 RX ORDER — ONDANSETRON 2 MG/ML
4 INJECTION INTRAMUSCULAR; INTRAVENOUS EVERY 6 HOURS PRN
Status: DISCONTINUED | OUTPATIENT
Start: 2020-10-20 | End: 2020-10-21 | Stop reason: HOSPADM

## 2020-10-20 RX ORDER — VANCOMYCIN HYDROCHLORIDE 1 G/20ML
INJECTION, POWDER, LYOPHILIZED, FOR SOLUTION INTRAVENOUS
Status: DISPENSED
Start: 2020-10-20 | End: 2020-10-21

## 2020-10-20 RX ORDER — VANCOMYCIN HYDROCHLORIDE 1 G/20ML
INJECTION, POWDER, LYOPHILIZED, FOR SOLUTION INTRAVENOUS PRN
Status: DISCONTINUED | OUTPATIENT
Start: 2020-10-20 | End: 2020-10-20 | Stop reason: ALTCHOICE

## 2020-10-20 RX ORDER — OXYCODONE HYDROCHLORIDE 5 MG/1
10 TABLET ORAL EVERY 4 HOURS PRN
Status: DISCONTINUED | OUTPATIENT
Start: 2020-10-20 | End: 2020-10-21 | Stop reason: HOSPADM

## 2020-10-20 RX ORDER — MORPHINE SULFATE 2 MG/ML
2 INJECTION, SOLUTION INTRAMUSCULAR; INTRAVENOUS EVERY 5 MIN PRN
Status: DISCONTINUED | OUTPATIENT
Start: 2020-10-20 | End: 2020-10-20 | Stop reason: HOSPADM

## 2020-10-20 RX ORDER — PROMETHAZINE HYDROCHLORIDE 25 MG/1
12.5 TABLET ORAL EVERY 6 HOURS PRN
Status: DISCONTINUED | OUTPATIENT
Start: 2020-10-20 | End: 2020-10-21 | Stop reason: HOSPADM

## 2020-10-20 RX ORDER — LUTEIN 10 MG
10 TABLET ORAL DAILY
Status: DISCONTINUED | OUTPATIENT
Start: 2020-10-20 | End: 2020-10-20 | Stop reason: RX

## 2020-10-20 RX ORDER — OXYCODONE HYDROCHLORIDE AND ACETAMINOPHEN 5; 325 MG/1; MG/1
1 TABLET ORAL PRN
Status: DISCONTINUED | OUTPATIENT
Start: 2020-10-20 | End: 2020-10-20 | Stop reason: HOSPADM

## 2020-10-20 RX ORDER — OXYCODONE HYDROCHLORIDE AND ACETAMINOPHEN 5; 325 MG/1; MG/1
2 TABLET ORAL PRN
Status: DISCONTINUED | OUTPATIENT
Start: 2020-10-20 | End: 2020-10-20 | Stop reason: HOSPADM

## 2020-10-20 RX ORDER — EPHEDRINE SULFATE 50 MG/ML
INJECTION INTRAVENOUS PRN
Status: DISCONTINUED | OUTPATIENT
Start: 2020-10-20 | End: 2020-10-20 | Stop reason: SDUPTHER

## 2020-10-20 RX ORDER — LABETALOL HYDROCHLORIDE 5 MG/ML
5 INJECTION, SOLUTION INTRAVENOUS EVERY 10 MIN PRN
Status: DISCONTINUED | OUTPATIENT
Start: 2020-10-20 | End: 2020-10-20 | Stop reason: HOSPADM

## 2020-10-20 RX ORDER — FAMOTIDINE 20 MG/1
20 TABLET, FILM COATED ORAL 2 TIMES DAILY
Status: DISCONTINUED | OUTPATIENT
Start: 2020-10-20 | End: 2020-10-21 | Stop reason: HOSPADM

## 2020-10-20 RX ORDER — SODIUM CHLORIDE 0.9 % (FLUSH) 0.9 %
10 SYRINGE (ML) INJECTION EVERY 12 HOURS SCHEDULED
Status: DISCONTINUED | OUTPATIENT
Start: 2020-10-20 | End: 2020-10-20 | Stop reason: HOSPADM

## 2020-10-20 RX ORDER — SODIUM CHLORIDE 0.9 % (FLUSH) 0.9 %
10 SYRINGE (ML) INJECTION EVERY 12 HOURS SCHEDULED
Status: DISCONTINUED | OUTPATIENT
Start: 2020-10-20 | End: 2020-10-21 | Stop reason: HOSPADM

## 2020-10-20 RX ORDER — SODIUM CHLORIDE, SODIUM LACTATE, POTASSIUM CHLORIDE, CALCIUM CHLORIDE 600; 310; 30; 20 MG/100ML; MG/100ML; MG/100ML; MG/100ML
INJECTION, SOLUTION INTRAVENOUS CONTINUOUS
Status: DISCONTINUED | OUTPATIENT
Start: 2020-10-20 | End: 2020-10-20

## 2020-10-20 RX ORDER — MEPERIDINE HYDROCHLORIDE 50 MG/ML
12.5 INJECTION INTRAMUSCULAR; INTRAVENOUS; SUBCUTANEOUS EVERY 5 MIN PRN
Status: DISCONTINUED | OUTPATIENT
Start: 2020-10-20 | End: 2020-10-20 | Stop reason: HOSPADM

## 2020-10-20 RX ORDER — OXYCODONE HYDROCHLORIDE 5 MG/1
5 TABLET ORAL EVERY 4 HOURS PRN
Status: DISCONTINUED | OUTPATIENT
Start: 2020-10-20 | End: 2020-10-21 | Stop reason: HOSPADM

## 2020-10-20 RX ORDER — DEXAMETHASONE SODIUM PHOSPHATE 4 MG/ML
INJECTION, SOLUTION INTRA-ARTICULAR; INTRALESIONAL; INTRAMUSCULAR; INTRAVENOUS; SOFT TISSUE PRN
Status: DISCONTINUED | OUTPATIENT
Start: 2020-10-20 | End: 2020-10-20 | Stop reason: SDUPTHER

## 2020-10-20 RX ORDER — ALBUMIN, HUMAN INJ 5% 5 %
SOLUTION INTRAVENOUS PRN
Status: DISCONTINUED | OUTPATIENT
Start: 2020-10-20 | End: 2020-10-20 | Stop reason: SDUPTHER

## 2020-10-20 RX ORDER — ROCURONIUM BROMIDE 10 MG/ML
INJECTION, SOLUTION INTRAVENOUS PRN
Status: DISCONTINUED | OUTPATIENT
Start: 2020-10-20 | End: 2020-10-20 | Stop reason: SDUPTHER

## 2020-10-20 RX ORDER — POLYETHYLENE GLYCOL 3350 17 G/17G
17 POWDER, FOR SOLUTION ORAL DAILY PRN
Status: DISCONTINUED | OUTPATIENT
Start: 2020-10-20 | End: 2020-10-21 | Stop reason: HOSPADM

## 2020-10-20 RX ORDER — LIDOCAINE HYDROCHLORIDE 20 MG/ML
INJECTION, SOLUTION EPIDURAL; INFILTRATION; INTRACAUDAL; PERINEURAL PRN
Status: DISCONTINUED | OUTPATIENT
Start: 2020-10-20 | End: 2020-10-20 | Stop reason: SDUPTHER

## 2020-10-20 RX ORDER — PROMETHAZINE HYDROCHLORIDE 25 MG/ML
6.25 INJECTION, SOLUTION INTRAMUSCULAR; INTRAVENOUS
Status: DISCONTINUED | OUTPATIENT
Start: 2020-10-20 | End: 2020-10-20 | Stop reason: HOSPADM

## 2020-10-20 RX ORDER — DIPHENHYDRAMINE HYDROCHLORIDE 50 MG/ML
12.5 INJECTION INTRAMUSCULAR; INTRAVENOUS
Status: DISCONTINUED | OUTPATIENT
Start: 2020-10-20 | End: 2020-10-20 | Stop reason: HOSPADM

## 2020-10-20 RX ORDER — WARFARIN SODIUM 5 MG/1
5 TABLET ORAL DAILY
Status: DISCONTINUED | OUTPATIENT
Start: 2020-10-20 | End: 2020-10-20 | Stop reason: SDUPTHER

## 2020-10-20 RX ORDER — WARFARIN SODIUM 5 MG/1
5 TABLET ORAL DAILY
Status: DISCONTINUED | OUTPATIENT
Start: 2020-10-20 | End: 2020-10-21 | Stop reason: HOSPADM

## 2020-10-20 RX ORDER — OXYCODONE HYDROCHLORIDE AND ACETAMINOPHEN 5; 325 MG/1; MG/1
1 TABLET ORAL EVERY 4 HOURS PRN
Qty: 42 TABLET | Refills: 0 | Status: SHIPPED | OUTPATIENT
Start: 2020-10-20 | End: 2020-11-09 | Stop reason: SDUPTHER

## 2020-10-20 RX ORDER — MORPHINE SULFATE 4 MG/ML
4 INJECTION, SOLUTION INTRAMUSCULAR; INTRAVENOUS
Status: DISCONTINUED | OUTPATIENT
Start: 2020-10-20 | End: 2020-10-21 | Stop reason: HOSPADM

## 2020-10-20 RX ORDER — MAGNESIUM HYDROXIDE 1200 MG/15ML
LIQUID ORAL CONTINUOUS PRN
Status: COMPLETED | OUTPATIENT
Start: 2020-10-20 | End: 2020-10-20

## 2020-10-20 RX ORDER — FENTANYL CITRATE 50 UG/ML
INJECTION, SOLUTION INTRAMUSCULAR; INTRAVENOUS PRN
Status: DISCONTINUED | OUTPATIENT
Start: 2020-10-20 | End: 2020-10-20 | Stop reason: SDUPTHER

## 2020-10-20 RX ORDER — FENTANYL CITRATE 50 UG/ML
INJECTION, SOLUTION INTRAMUSCULAR; INTRAVENOUS
Status: DISPENSED
Start: 2020-10-20 | End: 2020-10-21

## 2020-10-20 RX ORDER — SODIUM CHLORIDE 0.9 % (FLUSH) 0.9 %
10 SYRINGE (ML) INJECTION PRN
Status: DISCONTINUED | OUTPATIENT
Start: 2020-10-20 | End: 2020-10-20 | Stop reason: HOSPADM

## 2020-10-20 RX ORDER — DILTIAZEM HYDROCHLORIDE 120 MG/1
120 CAPSULE, COATED, EXTENDED RELEASE ORAL DAILY
Status: DISCONTINUED | OUTPATIENT
Start: 2020-10-20 | End: 2020-10-21 | Stop reason: HOSPADM

## 2020-10-20 RX ORDER — MORPHINE SULFATE 2 MG/ML
1 INJECTION, SOLUTION INTRAMUSCULAR; INTRAVENOUS EVERY 5 MIN PRN
Status: DISCONTINUED | OUTPATIENT
Start: 2020-10-20 | End: 2020-10-20 | Stop reason: HOSPADM

## 2020-10-20 RX ORDER — MECLIZINE HCL 12.5 MG/1
25 TABLET ORAL 3 TIMES DAILY PRN
Status: DISCONTINUED | OUTPATIENT
Start: 2020-10-20 | End: 2020-10-21 | Stop reason: HOSPADM

## 2020-10-20 RX ADMIN — CEFAZOLIN SODIUM 2 G: 10 INJECTION, POWDER, FOR SOLUTION INTRAVENOUS at 15:36

## 2020-10-20 RX ADMIN — LIDOCAINE HYDROCHLORIDE 50 MG: 20 INJECTION, SOLUTION EPIDURAL; INFILTRATION; INTRACAUDAL; PERINEURAL at 12:53

## 2020-10-20 RX ADMIN — EPHEDRINE SULFATE 10 MG: 50 INJECTION INTRAVENOUS at 13:06

## 2020-10-20 RX ADMIN — CEFAZOLIN SODIUM 2 G: 10 INJECTION, POWDER, FOR SOLUTION INTRAVENOUS at 12:50

## 2020-10-20 RX ADMIN — SODIUM CHLORIDE, POTASSIUM CHLORIDE, SODIUM LACTATE AND CALCIUM CHLORIDE: 600; 310; 30; 20 INJECTION, SOLUTION INTRAVENOUS at 12:18

## 2020-10-20 RX ADMIN — PROPOFOL 125 MG: 10 INJECTION, EMULSION INTRAVENOUS at 12:53

## 2020-10-20 RX ADMIN — ONDANSETRON 4 MG: 2 INJECTION INTRAMUSCULAR; INTRAVENOUS at 17:00

## 2020-10-20 RX ADMIN — EPHEDRINE SULFATE 10 MG: 50 INJECTION INTRAVENOUS at 14:26

## 2020-10-20 RX ADMIN — ROCURONIUM BROMIDE 20 MG: 10 SOLUTION INTRAVENOUS at 15:16

## 2020-10-20 RX ADMIN — SODIUM CHLORIDE, POTASSIUM CHLORIDE, SODIUM LACTATE AND CALCIUM CHLORIDE: 600; 310; 30; 20 INJECTION, SOLUTION INTRAVENOUS at 14:02

## 2020-10-20 RX ADMIN — DEXAMETHASONE SODIUM PHOSPHATE 10 MG: 4 INJECTION, SOLUTION INTRAMUSCULAR; INTRAVENOUS at 14:22

## 2020-10-20 RX ADMIN — ROCURONIUM BROMIDE 30 MG: 10 SOLUTION INTRAVENOUS at 13:48

## 2020-10-20 RX ADMIN — FENTANYL CITRATE 50 MCG: 50 INJECTION, SOLUTION INTRAMUSCULAR; INTRAVENOUS at 15:56

## 2020-10-20 RX ADMIN — ALBUMIN (HUMAN) 250 ML: 12.5 INJECTION, SOLUTION INTRAVENOUS at 16:01

## 2020-10-20 RX ADMIN — DILTIAZEM HYDROCHLORIDE 120 MG: 120 CAPSULE, COATED, EXTENDED RELEASE ORAL at 21:55

## 2020-10-20 RX ADMIN — Medication 10 ML: at 21:55

## 2020-10-20 RX ADMIN — ROCURONIUM BROMIDE 20 MG: 10 SOLUTION INTRAVENOUS at 16:00

## 2020-10-20 RX ADMIN — ROCURONIUM BROMIDE 50 MG: 10 SOLUTION INTRAVENOUS at 12:53

## 2020-10-20 RX ADMIN — FENTANYL CITRATE 50 MCG: 50 INJECTION, SOLUTION INTRAMUSCULAR; INTRAVENOUS at 12:53

## 2020-10-20 RX ADMIN — SODIUM CHLORIDE, POTASSIUM CHLORIDE, SODIUM LACTATE AND CALCIUM CHLORIDE: 600; 310; 30; 20 INJECTION, SOLUTION INTRAVENOUS at 16:27

## 2020-10-20 RX ADMIN — EPHEDRINE SULFATE 5 MG: 50 INJECTION INTRAVENOUS at 14:22

## 2020-10-20 RX ADMIN — EPHEDRINE SULFATE 15 MG: 50 INJECTION INTRAVENOUS at 12:57

## 2020-10-20 RX ADMIN — SUGAMMADEX 171 MG: 100 INJECTION, SOLUTION INTRAVENOUS at 16:53

## 2020-10-20 ASSESSMENT — PULMONARY FUNCTION TESTS
PIF_VALUE: 17
PIF_VALUE: 3
PIF_VALUE: 9
PIF_VALUE: 22
PIF_VALUE: 19
PIF_VALUE: 20
PIF_VALUE: 19
PIF_VALUE: 18
PIF_VALUE: 22
PIF_VALUE: 20
PIF_VALUE: 18
PIF_VALUE: 19
PIF_VALUE: 20
PIF_VALUE: 21
PIF_VALUE: 14
PIF_VALUE: 21
PIF_VALUE: 3
PIF_VALUE: 20
PIF_VALUE: 20
PIF_VALUE: 21
PIF_VALUE: 19
PIF_VALUE: 20
PIF_VALUE: 22
PIF_VALUE: 21
PIF_VALUE: 21
PIF_VALUE: 14
PIF_VALUE: 21
PIF_VALUE: 19
PIF_VALUE: 21
PIF_VALUE: 22
PIF_VALUE: 22
PIF_VALUE: 21
PIF_VALUE: 14
PIF_VALUE: 19
PIF_VALUE: 21
PIF_VALUE: 4
PIF_VALUE: 22
PIF_VALUE: 21
PIF_VALUE: 22
PIF_VALUE: 22
PIF_VALUE: 21
PIF_VALUE: 19
PIF_VALUE: 19
PIF_VALUE: 20
PIF_VALUE: 9
PIF_VALUE: 19
PIF_VALUE: 20
PIF_VALUE: 21
PIF_VALUE: 21
PIF_VALUE: 22
PIF_VALUE: 19
PIF_VALUE: 22
PIF_VALUE: 21
PIF_VALUE: 14
PIF_VALUE: 0
PIF_VALUE: 22
PIF_VALUE: 21
PIF_VALUE: 21
PIF_VALUE: 20
PIF_VALUE: 18
PIF_VALUE: 0
PIF_VALUE: 22
PIF_VALUE: 19
PIF_VALUE: 20
PIF_VALUE: 22
PIF_VALUE: 19
PIF_VALUE: 22
PIF_VALUE: 22
PIF_VALUE: 17
PIF_VALUE: 22
PIF_VALUE: 19
PIF_VALUE: 3
PIF_VALUE: 21
PIF_VALUE: 20
PIF_VALUE: 2
PIF_VALUE: 18
PIF_VALUE: 22
PIF_VALUE: 19
PIF_VALUE: 19
PIF_VALUE: 21
PIF_VALUE: 22
PIF_VALUE: 21
PIF_VALUE: 20
PIF_VALUE: 3
PIF_VALUE: 18
PIF_VALUE: 22
PIF_VALUE: 21
PIF_VALUE: 22
PIF_VALUE: 19
PIF_VALUE: 14
PIF_VALUE: 20
PIF_VALUE: 19
PIF_VALUE: 19
PIF_VALUE: 22
PIF_VALUE: 21
PIF_VALUE: 1
PIF_VALUE: 23
PIF_VALUE: 0
PIF_VALUE: 19
PIF_VALUE: 19
PIF_VALUE: 21
PIF_VALUE: 21
PIF_VALUE: 18
PIF_VALUE: 22
PIF_VALUE: 0
PIF_VALUE: 22
PIF_VALUE: 21
PIF_VALUE: 22
PIF_VALUE: 23
PIF_VALUE: 2
PIF_VALUE: 23
PIF_VALUE: 19
PIF_VALUE: 21
PIF_VALUE: 20
PIF_VALUE: 21
PIF_VALUE: 0
PIF_VALUE: 1
PIF_VALUE: 2
PIF_VALUE: 19
PIF_VALUE: 21
PIF_VALUE: 27
PIF_VALUE: 17
PIF_VALUE: 20
PIF_VALUE: 22
PIF_VALUE: 21
PIF_VALUE: 19
PIF_VALUE: 23
PIF_VALUE: 16
PIF_VALUE: 19
PIF_VALUE: 21
PIF_VALUE: 19
PIF_VALUE: 19
PIF_VALUE: 14
PIF_VALUE: 20
PIF_VALUE: 19
PIF_VALUE: 2
PIF_VALUE: 19
PIF_VALUE: 2
PIF_VALUE: 22
PIF_VALUE: 20
PIF_VALUE: 2
PIF_VALUE: 24
PIF_VALUE: 20
PIF_VALUE: 2
PIF_VALUE: 20
PIF_VALUE: 19
PIF_VALUE: 20
PIF_VALUE: 19
PIF_VALUE: 23
PIF_VALUE: 19
PIF_VALUE: 21
PIF_VALUE: 20
PIF_VALUE: 21
PIF_VALUE: 14
PIF_VALUE: 21
PIF_VALUE: 20
PIF_VALUE: 21
PIF_VALUE: 21
PIF_VALUE: 4
PIF_VALUE: 22
PIF_VALUE: 22
PIF_VALUE: 2
PIF_VALUE: 19
PIF_VALUE: 22
PIF_VALUE: 19
PIF_VALUE: 22
PIF_VALUE: 24
PIF_VALUE: 19
PIF_VALUE: 20
PIF_VALUE: 4
PIF_VALUE: 21
PIF_VALUE: 19
PIF_VALUE: 21
PIF_VALUE: 22
PIF_VALUE: 20
PIF_VALUE: 19
PIF_VALUE: 23
PIF_VALUE: 21
PIF_VALUE: 21
PIF_VALUE: 14
PIF_VALUE: 19
PIF_VALUE: 20
PIF_VALUE: 21
PIF_VALUE: 22
PIF_VALUE: 22
PIF_VALUE: 21
PIF_VALUE: 22
PIF_VALUE: 20
PIF_VALUE: 2
PIF_VALUE: 18
PIF_VALUE: 23
PIF_VALUE: 14
PIF_VALUE: 21
PIF_VALUE: 22
PIF_VALUE: 19
PIF_VALUE: 21
PIF_VALUE: 22
PIF_VALUE: 20
PIF_VALUE: 20
PIF_VALUE: 19
PIF_VALUE: 18
PIF_VALUE: 23
PIF_VALUE: 18
PIF_VALUE: 22
PIF_VALUE: 21
PIF_VALUE: 21
PIF_VALUE: 19
PIF_VALUE: 20
PIF_VALUE: 22
PIF_VALUE: 21
PIF_VALUE: 19
PIF_VALUE: 22
PIF_VALUE: 24
PIF_VALUE: 21
PIF_VALUE: 20
PIF_VALUE: 11
PIF_VALUE: 21
PIF_VALUE: 21
PIF_VALUE: 18
PIF_VALUE: 14
PIF_VALUE: 21
PIF_VALUE: 22
PIF_VALUE: 21
PIF_VALUE: 14
PIF_VALUE: 2
PIF_VALUE: 14
PIF_VALUE: 2
PIF_VALUE: 21
PIF_VALUE: 20
PIF_VALUE: 21
PIF_VALUE: 21
PIF_VALUE: 22
PIF_VALUE: 20
PIF_VALUE: 23
PIF_VALUE: 22
PIF_VALUE: 14
PIF_VALUE: 22
PIF_VALUE: 21
PIF_VALUE: 22
PIF_VALUE: 22
PIF_VALUE: 19
PIF_VALUE: 22
PIF_VALUE: 19
PIF_VALUE: 16
PIF_VALUE: 22
PIF_VALUE: 21
PIF_VALUE: 19
PIF_VALUE: 21
PIF_VALUE: 20
PIF_VALUE: 20
PIF_VALUE: 18
PIF_VALUE: 21
PIF_VALUE: 2
PIF_VALUE: 21
PIF_VALUE: 4
PIF_VALUE: 22
PIF_VALUE: 21
PIF_VALUE: 10
PIF_VALUE: 19
PIF_VALUE: 22
PIF_VALUE: 21
PIF_VALUE: 22
PIF_VALUE: 2
PIF_VALUE: 21
PIF_VALUE: 19
PIF_VALUE: 2
PIF_VALUE: 22

## 2020-10-20 ASSESSMENT — PAIN SCALES - GENERAL
PAINLEVEL_OUTOF10: 0

## 2020-10-20 NOTE — BRIEF OP NOTE
Brief Postoperative Note      Patient: Laurie Pickens  YOB: 1940  MRN: [id number]    Date of Procedure: 10/20/2020    Pre-Op Diagnosis: RIGHT DISTAL HUMERUS FRACTURE    Post-Op Diagnosis: Same       Procedure(s):  OPEN REDUCTION INTERNAL FIXATION RIGHT DISTAL HUMERUS FRACTURE    Surgeon(s):  Jean-Pierre Wright MD    Assistant:  Surgical Assistant: Glenny Paez    Anesthesia: General    Estimated Blood Loss (mL): 173    Complications: Bleeding, not excessively but was a prolonged case that was unable to be done with the use of tourniquet. the patient has been anticoagulated. No arterial or excessive bleeding encountered during the case, there is some steady venous loss necessary for the procedure. Specimens:   * No specimens in log *    Implants:  Implant Name Type Inv. Item Serial No.  Lot No. LRB No. Used Action   PLATE HUM DISTL ANG LCP 2.7/3.5MM 9H RT Screw/Plate/Nail/Jair PLATE HUM DISTL ANG LCP 2.7/3.5MM 9H RT  SYNTHES [id number] Right 1 Implanted   BONE MATRIX FORMABLE VIVIGEN 5CC Bone/Graft/Tissue/Human/Synth BONE MATRIX FORMABLE VIVIGEN [facility] [id number] Right 1 Implanted   SCREW CORTX SLFTP FTHRD 3.5X24MM Screw/Plate/Nail/Jair SCREW CORTX SLFTP FTHRD 3.5X24MM  SYNTHES  Right 2 Implanted   SCREW CORTX SLFTP FTHRD 3.5X26MM Screw/Plate/Nail/Jair SCREW CORTX SLFTP FTHRD 3.5X26MM  SYNTHES  Right 1 Implanted   PLATE TUBE 1/3 W/ COLLR LCP SS 69MM Screw/Plate/Nail/Jair PLATE TUBE 1/3 W/ COLLR LCP SS 69MM  SYNTHES  Right 1 Implanted         Drains:   Urethral Catheter Non-latex 16 fr (Active)       Findings: Appropriate reduction surgical fixation of comminuted distal humeral shaft fracture with supracondylar extension on the lateral side.     Electronically signed by Jean-Pierre Wright MD on 10/20/2020 at 5:29 PM

## 2020-10-20 NOTE — ANESTHESIA PRE PROCEDURE
Department of Anesthesiology  Preprocedure Note       Name:  Joanne Katz   Age:  67 y.o.  :  1940                                          MRN:  [id number]         Date:  10/20/2020      Surgeon: Josh Strange):  Terrell Lyman MD    Procedure: Procedure(s):  OPEN REDUCTION INTERNAL FIXATION RIGHT DISTAL HUMERUS FRACTURE    Medications prior to admission:   Prior to Admission medications    Medication Sig Start Date End Date Taking? Authorizing Provider   dilTIAZem (TIADYLT ER) 120 MG extended release capsule TAKE 1 CAPSULE BY MOUTH EVERY DAY 20   Nebo Fast[employer] GamesJENNIFER - CNP   warfarin (COUMADIN) 5 MG tablet Take 1 tablet by mouth daily  Patient taking differently: Take 5 mg by mouth daily 5 mg three times weekly 2.5 mg four times weekly 20   Nebo [employer] GamesJENNIFER - CNP   meclizine (ANTIVERT) 25 MG tablet Take 25 mg by mouth 3 times daily as needed    Historical Provider, MD   Glucosamine Sulfate 750 MG TABS Take 1 tablet by mouth daily. Historical Provider, MD   Multiple Vitamin (MULTIVITAMIN PO) Take  by mouth. Historical Provider, MD   LUTEIN PO Take  by mouth. Historical Provider, MD       Current medications:    Current Facility-Administered Medications   Medication Dose Route Frequency Provider Last Rate Last Dose    lactated ringers infusion   Intravenous Continuous Yamile Soni MD        sodium chloride flush 0.9 % injection 10 mL  10 mL Intravenous 2 times per day Yamile Soni MD        sodium chloride flush 0.9 % injection 10 mL  10 mL Intravenous PRN Yamile Soni MD        lidocaine PF 1 % injection 0.3 mL  0.3 mL Intradermal Once PRN Yamile Soni MD        ceFAZolin (ANCEF) 2 g in dextrose 5 % 100 mL IVPB  2 g Intravenous On Call to [address] Fu MD           Allergies:     Allergies   Allergen Reactions    Lyrica [Pregabalin] Other (See Comments)     fainted       Problem List:    Patient Active Problem List   Diagnosis Code    PAF (paroxysmal atrial fibrillation) ([facility]) I48.0    Acidosis E87.2    Closed displaced oblique fracture of shaft of right humerus S42.331A       Past Medical History:        Diagnosis Date    At[facility] fibrillation ([facility] [address]     Colonic polyp 07/31/2007    Paroxysmal atrial fibrillation ([facility] 8/6/2015    Vertigo        Past Surgical History:        Procedure Laterality Date    COLONOSCOPY  07/31/2007    JOINT REPLACEMENT Left 04/2016    hip    TONSILLECTOMY         Social History:    Social History     Tobacco Use    Smoking status: Never Smoker    Smokeless tobacco: Never Used   Substance Use Topics    Alcohol use: Yes     Alcohol/week: 7.0 - 10.0 standard drinks     Types: 7 - 10 Shots of liquor per week     Comment: occ                                Counseling given: Not Answered      Vital Signs (Current):   Vitals:    10/19/20 1345 10/19/20 1504   Weight: 188 lb (85.3 kg) 188 lb (85.3 kg)   Height: 5' 2\" (1.575 m) 5' 2\" (1.575 m)                                              BP Readings from Last 3 Encounters:   10/14/20 128/78   09/01/20 130/60   08/27/19 114/78       NPO Status:                                                                                 BMI:   Wt Readings from Last 3 Encounters:   10/19/20 188 lb (85.3 kg)   10/19/20 188 lb (85.3 kg)   10/14/20 188 lb (85.3 kg)     Body mass index is 34.39 kg/m².     CBC:   Lab Results   Component Value Date    WBC 16.4 10/14/2020    RBC 4.37 10/14/2020    HGB 14.5 10/14/2020    HCT 42.7 10/14/2020    MCV 97.7 10/14/2020    RDW 13.2 10/14/2020     10/14/2020       CMP:   Lab Results   Component Value Date     10/14/2020    K 3.8 10/14/2020    K 6.1 10/14/2020     10/14/2020    CO2 22 10/14/2020    BUN 14 10/14/2020    CREATININE <0.5 10/14/2020    GFRAA >60 10/14/2020    AGRATIO 1.4 10/14/2020    LABGLOM >60 10/14/2020    GLUCOSE 113 10/14/2020    PROT 7.2 10/14/2020    CALCIUM 8.7 10/14/2020    BILITOT 0.3 10/14/2020    ALKPHOS 90 10/14/2020 AST 50 10/14/2020    ALT 26 10/14/2020       POC Tests: No results for input(s): POCGLU, POCNA, POCK, POCCL, POCBUN, POCHEMO, POCHCT in the last 72 hours. Coags:   Lab Results   Component Value Date    PROTIME 26.7 10/14/2020    INR 2.28 10/14/2020       HCG (If Applicable): No results found for: PREGTESTUR, PREGSERUM, HCG, HCGQUANT     ABGs: No results found for: PHART, PO2ART, BHM7LHB, EOX7DGT, BEART, Z5MIHGXZ     Type & Screen (If Applicable):  No results found for: LABABO, LABRH    Drug/Infectious Status (If Applicable):  No results found for: HIV, HEPCAB    COVID-19 Screening (If Applicable):   Lab Results   Component Value Date    COVID19 NOT DETECTED 10/16/2020         Anesthesia Evaluation  Patient summary reviewed no history of anesthetic complications:   Airway: Mallampati: II  TM distance: >3 FB   Neck ROM: full  Mouth opening: > = 3 FB Dental: normal exam         Pulmonary:Negative Pulmonary ROS and normal exam  breath sounds clear to auscultation                             Cardiovascular:Negative CV ROS  Exercise tolerance: good (>4 METS),   (+) dysrhythmias: atrial fibrillation,         Rhythm: regular  Rate: normal                    Neuro/Psych:   Negative Neuro/Psych ROS              GI/Hepatic/Renal: Neg GI/Hepatic/Renal ROS            Endo/Other: Negative Endo/Other ROS                    Abdominal:           Vascular: negative vascular ROS. Pre-Operative Diagnosis: Closed displaced oblique fracture of shaft of right humerus, initial encounter [S42.331A]    67 y.o.   BMI:  Body mass index is 34.39 kg/m².      Vitals:    10/19/20 1345 10/19/20 1504   Weight: 188 lb (85.3 kg) 188 lb (85.3 kg)   Height: 5' 2\" (1.575 m) 5' 2\" (1.575 m)       Allergies   Allergen Reactions    Lyrica [Pregabalin] Other (See Comments)     fainted       Social History     Tobacco Use    Smoking status: Never Smoker    Smokeless tobacco: Never Used   Substance Use Topics    Alcohol use: Yes     Alcohol/week: 7.0 - 10.0 standard drinks     Types: 7 - 10 Shots of liquor per week     Comment: occ       LABS:    CBC  Lab Results   Component Value Date/Time    WBC 16.4 (H) 10/14/2020 12:09 PM    HGB 14.5 10/14/2020 12:09 PM    HCT 42.7 10/14/2020 12:09 PM     10/14/2020 12:09 PM     RENAL  Lab Results   Component Value Date/Time     (L) 10/14/2020 12:09 PM    K 3.8 10/14/2020 01:29 PM    K 6.1 ([facility]) 10/14/2020 12:09 PM     10/14/2020 12:09 PM    CO2 22 10/14/2020 12:09 PM    BUN 14 10/14/2020 12:09 PM    CREATININE <0.5 (L) 10/14/2020 12:09 PM    GLUCOSE 113 (H) 10/14/2020 12:09 PM     COAGS  Lab Results   Component Value Date/Time    PROTIME 26.7 (H) 10/14/2020 01:29 PM    INR 2.28 (H) 10/14/2020 01:29 PM     EKG 10/14/2020  Normal sinus rhythmNormal ECGWhen compared with ECG of 05-AUG-2015 20:00,Sinus rhythm has replaced Atrial fibrillationConfirmed by Evelyn Marrero MD, Brynn Escalante ([id number]) on 10/14/2020 5:30:14 PM        Anesthesia Plan      general     ASA 3     (I discussed with the patient the risks and benefits of regional anesthesia (inlcuding infection, bleeding, damage to nerves and surrounding structures) PIV, General, IV Narcotics, PACU. All questions were answered the patient agrees with the plan and wishes to proceed.)  Induction: intravenous.                           Jennifer Kilgore MD   10/20/2020

## 2020-10-21 VITALS
RESPIRATION RATE: 16 BRPM | DIASTOLIC BLOOD PRESSURE: 75 MMHG | WEIGHT: 188 LBS | OXYGEN SATURATION: 96 % | TEMPERATURE: 97.9 F | HEART RATE: 92 BPM | BODY MASS INDEX: 34.6 KG/M2 | SYSTOLIC BLOOD PRESSURE: 134 MMHG | HEIGHT: 62 IN

## 2020-10-21 LAB
ANION GAP SERPL CALCULATED.3IONS-SCNC: 9 MMOL/L (ref 3–16)
BASOPHILS ABSOLUTE: 0 K/UL (ref 0–0.2)
BASOPHILS RELATIVE PERCENT: 0 %
BUN BLDV-MCNC: 14 MG/DL (ref 7–20)
CALCIUM SERPL-MCNC: 8.2 MG/DL (ref 8.3–10.6)
CHLORIDE BLD-SCNC: 103 MMOL/L (ref 99–110)
CO2: 24 MMOL/L (ref 21–32)
CREAT SERPL-MCNC: <0.5 MG/DL (ref 0.6–1.2)
EOSINOPHILS ABSOLUTE: 0 K/UL (ref 0–0.6)
EOSINOPHILS RELATIVE PERCENT: 0 %
GFR AFRICAN AMERICAN: >60
GFR NON-AFRICAN AMERICAN: >60
GLUCOSE BLD-MCNC: 143 MG/DL (ref 70–99)
HCT VFR BLD CALC: 29.7 % (ref 36–48)
HEMOGLOBIN: 10.2 G/DL (ref 12–16)
INR BLD: 0.97 (ref 0.86–1.14)
LYMPHOCYTES ABSOLUTE: 0.7 K/UL (ref 1–5.1)
LYMPHOCYTES RELATIVE PERCENT: 7.6 %
MCH RBC QN AUTO: 34.8 PG (ref 26–34)
MCHC RBC AUTO-ENTMCNC: 34.5 G/DL (ref 31–36)
MCV RBC AUTO: 100.9 FL (ref 80–100)
MONOCYTES ABSOLUTE: 0.8 K/UL (ref 0–1.3)
MONOCYTES RELATIVE PERCENT: 8.1 %
NEUTROPHILS ABSOLUTE: 8.3 K/UL (ref 1.7–7.7)
NEUTROPHILS RELATIVE PERCENT: 84.3 %
PDW BLD-RTO: 13 % (ref 12.4–15.4)
PLATELET # BLD: 235 K/UL (ref 135–450)
PMV BLD AUTO: 7.3 FL (ref 5–10.5)
POTASSIUM REFLEX MAGNESIUM: 4.4 MMOL/L (ref 3.5–5.1)
PROTHROMBIN TIME: 11.3 SEC (ref 10–13.2)
RBC # BLD: 2.94 M/UL (ref 4–5.2)
SODIUM BLD-SCNC: 136 MMOL/L (ref 136–145)
WBC # BLD: 9.9 K/UL (ref 4–11)

## 2020-10-21 PROCEDURE — 97535 SELF CARE MNGMENT TRAINING: CPT

## 2020-10-21 PROCEDURE — 97162 PT EVAL MOD COMPLEX 30 MIN: CPT

## 2020-10-21 PROCEDURE — 85610 PROTHROMBIN TIME: CPT

## 2020-10-21 PROCEDURE — 2580000003 HC RX 258: Performed by: PHYSICIAN ASSISTANT

## 2020-10-21 PROCEDURE — 97116 GAIT TRAINING THERAPY: CPT

## 2020-10-21 PROCEDURE — G0378 HOSPITAL OBSERVATION PER HR: HCPCS

## 2020-10-21 PROCEDURE — 97166 OT EVAL MOD COMPLEX 45 MIN: CPT

## 2020-10-21 PROCEDURE — 2700000000 HC OXYGEN THERAPY PER DAY

## 2020-10-21 PROCEDURE — 36415 COLL VENOUS BLD VENIPUNCTURE: CPT

## 2020-10-21 PROCEDURE — 94761 N-INVAS EAR/PLS OXIMETRY MLT: CPT

## 2020-10-21 PROCEDURE — 85025 COMPLETE CBC W/AUTO DIFF WBC: CPT

## 2020-10-21 PROCEDURE — 80048 BASIC METABOLIC PNL TOTAL CA: CPT

## 2020-10-21 PROCEDURE — 97530 THERAPEUTIC ACTIVITIES: CPT

## 2020-10-21 PROCEDURE — 6370000000 HC RX 637 (ALT 250 FOR IP): Performed by: PHYSICIAN ASSISTANT

## 2020-10-21 RX ADMIN — Medication 10 ML: at 10:04

## 2020-10-21 RX ADMIN — FAMOTIDINE 20 MG: 20 TABLET ORAL at 10:04

## 2020-10-21 RX ADMIN — ACETAMINOPHEN 650 MG: 325 TABLET ORAL at 15:31

## 2020-10-21 RX ADMIN — ACETAMINOPHEN 650 MG: 325 TABLET ORAL at 10:04

## 2020-10-21 RX ADMIN — OXYCODONE 5 MG: 5 TABLET ORAL at 04:08

## 2020-10-21 RX ADMIN — OXYCODONE 10 MG: 5 TABLET ORAL at 15:31

## 2020-10-21 ASSESSMENT — PAIN DESCRIPTION - PROGRESSION: CLINICAL_PROGRESSION: NOT CHANGED

## 2020-10-21 ASSESSMENT — PAIN DESCRIPTION - ONSET
ONSET: PROGRESSIVE
ONSET: ON-GOING

## 2020-10-21 ASSESSMENT — PAIN DESCRIPTION - PAIN TYPE
TYPE: ACUTE PAIN;SURGICAL PAIN
TYPE: ACUTE PAIN

## 2020-10-21 ASSESSMENT — PAIN DESCRIPTION - ORIENTATION: ORIENTATION: RIGHT

## 2020-10-21 ASSESSMENT — PAIN DESCRIPTION - FREQUENCY
FREQUENCY: CONTINUOUS
FREQUENCY: CONTINUOUS

## 2020-10-21 ASSESSMENT — PAIN SCALES - GENERAL
PAINLEVEL_OUTOF10: 0
PAINLEVEL_OUTOF10: 7
PAINLEVEL_OUTOF10: 6
PAINLEVEL_OUTOF10: 4
PAINLEVEL_OUTOF10: 2
PAINLEVEL_OUTOF10: 4

## 2020-10-21 ASSESSMENT — PAIN DESCRIPTION - DESCRIPTORS
DESCRIPTORS: ACHING
DESCRIPTORS: ACHING

## 2020-10-21 ASSESSMENT — PAIN DESCRIPTION - LOCATION
LOCATION: ARM
LOCATION: BACK

## 2020-10-21 ASSESSMENT — PAIN - FUNCTIONAL ASSESSMENT
PAIN_FUNCTIONAL_ASSESSMENT: ACTIVITIES ARE NOT PREVENTED
PAIN_FUNCTIONAL_ASSESSMENT: ACTIVITIES ARE NOT PREVENTED

## 2020-10-21 NOTE — PROGRESS NOTES
Occupational Therapy   Occupational Therapy Initial Assessment  Date: 10/21/2020   Patient Name: Jacqueline Johns  MRN: [id number]     : 1940    Date of Service: 10/21/2020    Discharge Recommendations:  24 hour supervision or assist, Home with nursing aide, Home with Home health OT  OT Equipment Recommendations  Equipment Needed: Yes  Mobility Devices: ADL Assistive Devices  ADL Assistive Devices: Toilet Safety Frame; Reacher  Other: pt will require toilet safety frame to increase safety and independence with toilet transfers and toilet due to deficits in balance, strength, activity tolerance, limited use of RUE. Pt will require reacher to increase safety and independence with LB dressing and item retrieval due to deficits in balance and limited use of RUE. Assessment   Performance deficits / Impairments: Decreased functional mobility ; Decreased endurance;Decreased ADL status; Decreased balance;Decreased high-level IADLs  Assessment: Pt tolerated OT evaluation well. At baseline pt is independent with I/ADLs and ambulates with cane prn. Currently pt requiring Mod A to SBA with simple ADLs. Pt currently requiring CGA-SBA with functional transfers/mobility using SBQC. Pt reports strong social support from family and that  can provide 24/7 supervision/assist if needed. Pt provided with education on NWB RUE, ROM restrictions, sling mnaagement, as well as training on compensatory ADL strategies. Pt is not at base line level of occupational performance. Recommend continued OT services to increase safety and independence with ADLs and functional transfers. Anticipate pt will be safe to return home with 24/7 supervison/assist and HHOT. Pt will benefit from toilet safety frame and reacher for safe ADL engagement while recovering from injuries. Prognosis: Good  Decision Making: Medium Complexity      OT Education: OT Role;IADL Safety;Plan of Care;Precautions; Equipment;ADL Adaptive Strategies;Transfer Training  Patient Education: home safety concerns, OT discharge recommendations, compensatory UB/LB dressing, sling management, encourage ROM to R hand/wrist, toilet transfers, recommendation for toilet safety frame at home, standing balance, safety with toileting. Barriers to Learning: pt demonstrates and verbalizes understanding. REQUIRES OT FOLLOW UP: Yes    Activity Tolerance  Activity Tolerance: Patient Tolerated treatment well  Safety Devices  Safety Devices in place: Yes  Type of devices: Call light within reach;Nurse notified; Bed alarm in place; Left in bed           Patient Diagnosis(es): The encounter diagnosis was Closed displaced oblique fracture of shaft of right humerus, initial encounter. has a past medical history of Arthritis, Atrial fibrillation ([address] Colonic polyp, Paroxysmal atrial fibrillation ([address] and Vertigo. has a past surgical history that includes Tonsillectomy; Colonoscopy (07/31/2007); joint replacement (Left, 04/2016); and Humerus fracture surgery (Right, 10/20/2020). Restrictions  Restrictions/Precautions  Restrictions/Precautions: Weight Bearing, Fall Risk, General Precautions  Required Braces or Orthoses?: Yes  Upper Extremity Weight Bearing Restrictions  Right Upper Extremity Weight Bearing: Non Weight Bearing  Required Braces or Orthoses  Right Upper Extremity Brace/Splint: Sling  Position Activity Restriction  Other position/activity restrictions: NWB RUE, ok ROM at wrist/hand, ok for elbow ROM on POD #2, up with assist    Subjective   General  Chart Reviewed: Yes  Patient assessed for rehabilitation services?: Yes  Family / Caregiver Present: No    Diagnosis: Pt admitted with closed displaced oblique fx to shaft of R humerus. Pt underwent ORIF on 10/20/20    Subjective  Subjective: Pt supine in bed upon OT arrival. Pt reports fatigue and agreeable to OT session. General Comment  Comments: RN agreeable to OT session.     Patient Currently in Pain: Yes  Pain Assessment  Pain Assessment: 0-10  Pain Level: 7  Pain Type: Acute pain;Surgical pain  Pain Location: Arm  Pain Orientation: Right  Pain Descriptors: Aching  Pain Frequency: Continuous  Pain Onset: On-going  Functional Pain Assessment: Activities are not prevented  Non-Pharmaceutical Pain Intervention(s): Distraction;Elevation;Repositioned  Response to Pain Intervention: Patient Satisfied  Multiple Pain Sites: No  Pre Treatment Pain Screening  Intervention List: Patient able to continue with treatment;Nurse/Physician notified    Social/Functional History  Social/Functional History  Lives With: Spouse( (able to provide 24-hr sup/assist))  Type of Home: House  Home Layout: One level, Laundry in basement  Home Access: Stairs to enter with rails  Entrance Stairs - Number of Steps: 3 LESVIA  Entrance Stairs - Rails: Right(ascending)  Bathroom Shower/Tub: Tub/Shower unit(pt has been taking sponge baths since her injury)  Bathroom Toilet: Handicap height  Home Equipment: Cane(pt doesn't typically wear home O2)  ADL Assistance: Independent  Homemaking Assistance: Independent  Homemaking Responsibilities: Yes  Meal Prep Responsibility: Primary  Laundry Responsibility: Primary  Cleaning Responsibility: Primary  Shopping Responsibility: Primary  Ambulation Assistance: Independent(amb without AD in house, occasionally uses cane in community or in unfamiliar environments)  Transfer Assistance: Independent  Active : No  Patient's  Info:   Mode of Transportation: Family  Occupation: Retired  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 [address]: Working in the yard, stitching, writing letters       Objective   Vision: Within Functional Limits  Hearing: Within functional limits      Orientation  Overall Orientation Status: Within Normal Limits    Observation/Palpation  Posture: Fair     Balance  Sitting Balance: Independent  Standing Balance: Contact guard assistance(CGA quickly progressing to SBA with SBQC)  Standing Balance  Time: 3-4 minutes  Activity: bathroom mobility, toileting/LB dressing, grooming in stance at sink  Comment: CGA quickly progressing to SBA with use of SBQC    Functional Mobility  Functional - Mobility Device: Other(SBQC)  Activity: To/from bathroom  Assist Level: Contact guard assistance  Functional Mobility Comments: CGA quickly progressing to SBA with use of [employer]    Toilet Transfers  Toilet - Technique: Ambulating  Equipment Used: Standard bedside commode  Toilet Transfer: Stand by assistance  Toilet Transfers Comments: SBQC for approach, LUE on armrests to/from [facility] over toilet    ADL  Grooming: Stand by assistance; Increased time to complete(in stance at sink to wash hands)  LE Dressing: Moderate assistance(doff/don pants and socks. Pt requiring assist with socks and partial assist to don R side of pants over hips. Pt uses reacher to un/thread BLE. Intermittent UE support on SBQC when in stance)  Toileting: Minimal assistance(pericare performed seated. Pt requiring partial assist to don clothing over R side of hips. Intermittent UE support on SBQC when in stance)     Tone RUE  RUE Tone: Normotonic  Tone LUE  LUE Tone: Normotonic  Coordination  Movements Are Fluid And Coordinated: Yes       Bed mobility  Supine to Sit: Stand by assistance  Sit to Supine: Stand by assistance  Comment: bedrail     Transfers  Stand Step Transfers: Stand by assistance  Sit to stand: Stand by assistance  Stand to sit: Stand by assistance  Transfer Comments: to/from EOB using SBQC    Vision - Basic Assessment  Prior Vision: No visual deficits  Patient Visual Report: No visual complaint reported. Visual Field Cut: No  Oculo Motor Control: WNL     Cognition  Overall Cognitive Status: WNL           Sensation  Overall Sensation Status: WNL          LUE AROM (degrees)  LUE AROM : WFL  RUE AROM (degrees)  RUE AROM : Exceptions  RUE General AROM: Hand/Wrist AROM WNL.  Unable to assess further due to ROM

## 2020-10-21 NOTE — PROGRESS NOTES
S: doing well, pain well controlled. No concerns    O: Splint CDI, SILT MUR, has wrist extension but not finger extension intact, ain/u intact motor. A/P: 66 yo F s/p orif R distal humerus fracture    - OOB with PT, make sure can balance, ok for DC  - OT consult for hand, wrist ROM. Out of splint to start elbow ROM on POD2 if still in house. NWB RUE. Radial nerve intact but a bit weak on exam, unclear if residual block or surgical neuropraxia. Sensation and wrist extension intact. Follow for potential cock-up splint if still present POD2.  - resume home anticoag today, warfarin  - OK to DC home when clears PT, has seen OT. Discharge pending those. Follow up with me on Friday if not still inpatient.

## 2020-10-21 NOTE — OP NOTE
Operative Note      Patient: Maria Alejandra Giordano  YOB: 1940  MRN: [id number]    Date of Procedure: 10/20/2020    Pre-Op Diagnosis: RIGHT DISTAL HUMERUS FRACTURE    Post-Op Diagnosis: Same       Procedure(s):  OPEN REDUCTION INTERNAL FIXATION RIGHT DISTAL HUMERUS FRACTURE [id number]    Surgeon(s):  Campbell Seymour MD    Assistant:   Surgical Assistant: Berta Barnes    Anesthesia: General    Estimated Blood Loss (mL): 413    Complications: Bleeding, no damage to major vessels just a large incision with a tourniquet on anticoagulated patient. Had a steady venous ooze that was maintained controlled throughout the procedure. She was hemodynamically stable throughout under anesthesia. Specimens:   * No specimens in log *    Implants:  Implant Name Type Inv.  Item Serial No.  Lot No. LRB No. Used Action   PLATE HUM DISTL ANG LCP 2.7/3.5MM 9H RT Screw/Plate/Nail/Jair PLATE HUM DISTL ANG LCP 2.7/3.5MM 9H RT  [employer] [id number] Right 1 Implanted   BONE MATRIX FORMABLE VIVIGEN [facility] Bone/Graft/Tissue/Human/Synth BONE MATRIX FORMABLE VIVIGEN [address] [place] [address] [id number] Right 1 Implanted   SCREW CORTX SLFTP FTHRD 3.5X24MM Screw/Plate/Nail/Jair SCREW CORTX SLFTP FTHRD 3.5X24MM  [employer]oder  Right 2 Implanted   SCREW CORTX SLFTP FTHRD 3.5X26MM Screw/Plate/Nail/Jair SCREW CORTX SLFTP FTHRD 3.5X26MM  Take[employer]oder  Right 1 Implanted   SCREW VARI-ANGL LK 2.7X18MM Screw/Plate/Nail/Jair SCREW VARI-ANGL LK 2.7X18MM  Take[employer]oder  Right 1 Implanted   SCREW LK SLFT W/STARDRIVE RECESS 8.3L27CT Screw/Plate/Nail/Jair SCREW LK SLFT W/STARDRIVE RECESS 1.3W23JZ  Take[employer]oder  Right 1 Implanted   SCREW VARI-ANGL LK 2.7X14MM Screw/Plate/Nail/Jair SCREW VARI-ANGL LK 2.7X14MM  Take[employer]oder  Right 1 Implanted         Drains:   Urethral Catheter Non-latex 16 fr (Active)   Urine Color Yellow 10/20/20 1819   Urine Appearance Clear 10/20/20 1819       Findings: Appropriate reduction surgical fixation of right distal humerus fracture with lateral extension into the supracondylar area. Indications for procedure: This is a 78-year-old female who fell and sustained a right distal humerus fracture. It is significantly displaced and distal enough that she would have significant dysfunction without operative management and likely would not heal.  She is right-hand dominant. Accordingly she was indicated for surgical treatment of this. This benefits and alternatives were discussed the patient at length. For full discussion please see my clinic note for the day prior to surgery but of note detailed discussion was have given the high risk of stiffness of the elbow as well as high risk of radial nerve neuropraxia or injury with this procedure. Detailed Description of Procedure:   Patient was identified in the preoperative holding area. Informed consent was verified. History and physical exam forms were updated. Regional anesthesia block was performed by the anesthesia team.  Patient was then transported back to the operating suite where she was placed under general endotracheal anesthesia and positioned prone on an operating table with the radiolucent arm pad on the operative side. All bony prominences were well-padded Marti catheter was placed preoperatively. Appropriate antibiotics were given at the appropriate time. The right upper extremities prepped and draped in the usual sterile fashion. A sterile tourniquet was placed in the proximal portion of the arm. The right upper extremity was exsanguinated and tourniquet was insufflated to 250 mmHg. A posterior approach to the distal humerus was performed. The triceps fascia was identified and incised along the length of the humerus. A triceps splitting approach was then used to expose the humerus and the fracture.   Careful dissection was taken approximately with the tourniquet up but unfortunately the length of the fracture required enough proximal exposure that I was unable to continue using a tourniquet this time. Tourniquet was taken down and hemostasis obtained. Incision was then extended proximally another 6 cm. The triceps fascia was also incised at this and careful dissection was taken down in case an aberrant radial nerve was encountered. The fracture was mobilized distally and a reduction clamp was placed. Unfortunately there is significant comminution not appreciated on the CT. Accordingly I was unable to stably clamp the fracture. I was forced to then use a temporary posterior plate to hold this reduced. I then placed a medial plate to try and hold this reduced in a way that was outside of the posterior lateral plate that I plan on using. I then removed the posterior plate but unfortunately the medial plate was unable to hold the reduction for place with a posterior plate and fractured  about the distal screws. Nonetheless this did still help me hold reduction. I was then able to place a lag screw across the fracture to reduce the anterior spike and subsequently a [employer]ynthes posterolateral distal humerus plate plate with 5 screws distal to the fracture and excellent fixation of 3 bicortical screws in the humeral shaft. Fluoroscopic imaging was used to verify the plate placement and the reduction on AP and lateral views of the elbow. There is appropriate reduction and excellent fixation. I took the elbow through range of motion to make sure there is no impingement about the plate in the olecranon fossa and that the fixation was stable. There is no fracture movement with flexion or extension, with the forearm prone of supination and rotational stresses. Accordingly I felt that this fixation was appropriate. After a final check to make sure that our hemostasis was good the wound was copiously irrigated. I then placed cellular allograft about the fracture site to help with early healing and promote motion. I also added vancomycin powder. The triceps fascia was then closed using 0 PDS. Further irrigation was then done. Eric's fascia was closed with 2-0 Monocryl's. Skin was closed with 2-0 Monocryl followed by staples. A sterile dressing was applied consisting of Xeroform 4 x 4's and ABDs. The posterior slab splint was applied and the patient was repositioned supine and extubated and transferred to the postoperative care unit in stable condition. Postoperative plan:  The patient will be nonweightbearing to the right upper extremity. Because of blood loss and Coumadin use we will splint her for a couple days for soft tissue rest and then get her moving early with a hinged elbow brace. Once we get her moving she will be okay to use this arm as tolerated for feeding and hygiene but not lift anything. I will see her back within 2 to 3 days postoperatively for this and will get early occupational therapy involvement.     Electronically signed by Campbell Seymour MD on 10/21/2020 at 7:16 AM

## 2020-10-21 NOTE — CONSULTS
Received hospitalist consult order. Reviewed EMR. Discussed with pt's nurse. Possible plan for discharge today. IM will sign off. Please re-consult if needed.      Lucas Bell Hospitalist CNP

## 2020-10-21 NOTE — DISCHARGE SUMMARY
Department of Orthopedic Surgery  Physician Assistant   Discharge Summary    The Katharine Renner is a 67 y.o. female admitted for distal humerus orif. Katharine Renner was admitted to the floor following Her recovery in the PACU. Discharge Diagnosis  right distal humerus ORIF    Current Inpatient Medications    Current Facility-Administered Medications: dilTIAZem (CARDIZEM CD) extended release capsule 120 mg, 120 mg, Oral, Daily  meclizine (ANTIVERT) tablet 25 mg, 25 mg, Oral, TID PRN  sodium chloride flush 0.9 % injection 10 mL, 10 mL, Intravenous, 2 times per day  sodium chloride flush 0.9 % injection 10 mL, 10 mL, Intravenous, PRN  polyethylene glycol (GLYCOLAX) packet 17 g, 17 g, Oral, Daily PRN  promethazine (PHENERGAN) tablet 12.5 mg, 12.5 mg, Oral, Q6H PRN **OR** ondansetron (ZOFRAN) injection 4 mg, 4 mg, Intravenous, Q6H PRN  famotidine (PEPCID) tablet 20 mg, 20 mg, Oral, BID  oxyCODONE (ROXICODONE) immediate release tablet 5 mg, 5 mg, Oral, Q4H PRN **OR** oxyCODONE (ROXICODONE) immediate release tablet 10 mg, 10 mg, Oral, Q4H PRN  acetaminophen (TYLENOL) tablet 650 mg, 650 mg, Oral, Q6H  morphine (PF) injection 2 mg, 2 mg, Intravenous, Q2H PRN **OR** morphine (PF) injection 4 mg, 4 mg, Intravenous, Q2H PRN  warfarin (COUMADIN) tablet 5 mg, 5 mg, Oral, Daily    Post-operatively the patients diet was advanced as tolerated and their dressing was changed on POD #1. The incision is dressing in place, clean, dry and intact with no signs of infection. The patient remained neurovascularly intact in the right upper and had intact pulses distally. Patients calf remained soft and showed no evidence of DVT. The patient was able to move their right upper extremity without any problems post-operatively. Physical therapy and occupational therapy were consulted and began working with the patient post-operatively.   The patient progressed with PT/OT as would be expected and continued to improve through their stay. The patients pain was initially controlled with IV medications but we were able to transition to oral pain medications soon after arrival to the floor and their pain remained under good control through their hospital stay. From a medical standpoint the patient remained stable and continued to have the medicine team follow throughout their stay. The patient will be discharged at this time to Home  with their current diet restrictions and will continue to follow the precautions outlined to them by us and PT/OT. Condition on Discharge: Stable    Plan  Return visit in 2 days. .  Patient was instructed on the use of pain medications, the signs and symptoms of infection, and was given our number to call should they have any questions or concerns following discharge. For opioid prescriptions given at discharge the following statement is provided for compliance with [employer] rules. Patient being given increased dosage/quantity of opoid pain medication in excess of [employer] guidelines which noted a 30 MED daily of opioids due to the fact that he/she has undergone major orthopaedic surgery as outlined in rule 4731-11-13. Dosages and further duration of the pain medication will be re-evaluated at her post op visit in 2 weeks. Patient was educated on dosing expectations and limits of prescribing as a result of the new [facility] Board rules enacted August 31, 2017. Please also note that this is not the initial opoid prescription issued to this patient but a continuation of medication utilized during the hospital admission as noted in the medical record. OARRS report has also been utilized to screen for any abuse history or suspicious activity as outlined in [place]. All efforts have been taken to prevent abuse potential and misuse of opioid medications including education, screening, and close clinical follow up.

## 2020-10-21 NOTE — CARE COORDINATION
CASE MANAGEMENT INITIAL ASSESSMENT      Reviewed chart and completed assessment with:  Pt and  at bedside. Explained Case Management role/services. Primary contact information:  Dionicio Bowman [phone]. Health Care Decision Maker:   Who do you trust or have selected to make healthcare decisions for you? Name:   Yeimi Lugo [phone]  Phone  Number:  [phone]  Can this person be reached and be able to respond quickly, such as within a few minutes or hours? Yes    Admit date/status: 10/20/20 Observation. Diagnosis:  Open Reduction Internal fixation right distal humerus fracture. Is this a Readmission?:  No      Insurance: BCBS Medicare   Precert required for SNF: Yes       3 night stay required: No    Living arrangements, Adls, care needs, prior to admission: lives in a one story house 3SE with spouse. Independent in ADL's. Transportation: Family     Durable Medical Equipment at home:  Walker_X_Cane_X_RTS__ BSC__Shower Chair__  02__ HHN__ CPAP__  BiPap__  Hospital Bed__ W/C___ Other__________    Services in the home and/or outpatient, prior to admission: None    Dialysis Facility (if applicable)   · Name:  · Address:  · Dialysis Schedule:  · Phone:  · Fax:    PT/OT recs: Home with Pt/OT    Hospital Exemption Notification (HEN): Needed for SNF, not initiated. Barriers to discharge: None    Plan/comments: CM met with pt and  at bedside for initial assessment and to discuss [facility].. Both pt and  are in agreement for [facility], [facility] and do NOT have an agency preference. OK for referral to [facility], spoke to BODØ with General acute hospital.  CM following-Jen Anne RN       ECOC on chart for MD signature

## 2020-10-21 NOTE — DISCHARGE INSTR - COC
Continuity of Care Form    Patient Name: Mirela Soni   :  1940  MRN:  [id number]    Admit date:  10/20/2020  Discharge date:  ***    Code Status Order: Full Code   Advance Directives:   Advance Care Flowsheet Documentation       Date/Time Healthcare Directive Type of Healthcare Directive Copy in [address] Agent's Name Healthcare Agent's Phone Number    10/20/20 1212  Yes, patient has an advance directive for healthcare treatment --  No, copy requested from family -- -- --    10/19/20 1510  Yes, patient has an advance directive for healthcare treatment --  -- -- -- --            Admitting Physician:  Traci Kilgore MD  PCP: Marc Hensley MD    Discharging Nurse: [facility] Unit/Room#: 7971/2680-50  Discharging Unit Phone Number: ***    Emergency Contact:   Extended Emergency Contact Information  Primary Emergency Contact: Chandler Levi  Address: [address] [place] [address] Phone: [phone]  Mobile Phone: [phone]  Relation: Spouse    Past Surgical History:  Past Surgical History:   Procedure Laterality Date    COLONOSCOPY  2007    HUMERUS FRACTURE SURGERY Right 10/20/2020    OPEN REDUCTION INTERNAL FIXATION RIGHT DISTAL HUMERUS FRACTURE performed by Traci Kilgore MD at [place] [address] Left 2016    hip    TONSILLECTOMY         Immunization History: There is no immunization history for the selected administration types on file for this patient.     Active Problems:  Patient Active Problem List   Diagnosis Code    PAF (paroxysmal atrial fibrillation) ([facility]) I48.0    Acidosis E87.2    Closed displaced oblique fracture of shaft of right humerus S42.331A    H/O fracture of humerus Z87.81       Isolation/Infection:   Isolation            No Isolation          Patient Infection Status       None to display            Nurse Assessment:  Last Vital Signs: /75 Pulse 92   Temp 97.9 °F (36.6 °C) (Oral)   Resp 16   Ht 5' 2\" (1.575 m)   Wt 188 lb (85.3 kg)   SpO2 96%   BMI 34.39 kg/m²     Last documented pain score (0-10 scale): Pain Level: 7  Last Weight:   Wt Readings from Last 1 Encounters:   10/19/20 188 lb (85.3 kg)     Mental Status:  {IP PT MENTAL STATUS:}    IV Access:  { SHALOM IV ACCESS:331441424}    Nursing Mobility/ADLs:  Walking   {CHP DME LKVE:530458065}  Transfer  {CHP DME ZXMV:376371208}  Bathing  {CHP DME BTUL:707303066}  Dressing  {CHP DME BUAL:708078308}  Toileting  {CHP DME BGGM:992998237}  Feeding  {CHP DME URKJ:450428530}  Med Admin  {P DME EMPW:251817748}  Med Delivery   { SHALOM MED Delivery:053136241}    Wound Care Documentation and Therapy:        Elimination:  Continence: Bowel: {YES / F}  Bladder: {YES / JZ:97625}  Urinary Catheter: {Urinary Catheter:718115837}   Colostomy/Ileostomy/Ileal Conduit: {YES / SJ:00668}       Date of Last BM: ***    Intake/Output Summary (Last 24 hours) at 10/21/2020 1654  Last data filed at 10/20/2020 1930  Gross per 24 hour   Intake 620 ml   Output 550 ml   Net 70 ml     I/O last 3 completed shifts: In: 0336 [P.O.:120;  I.V.:1500]  Out: 1050 [Urine:350; Blood:700]    Safety Concerns:     [address] Safety Concerns:314627021}    Impairments/Disabilities:      [address] Impairments/Disabilities:740772373}    Nutrition Therapy:  Current Nutrition Therapy:   [address] Diet List:088746898}    Routes of Feeding: {CHP DME Other Feedings:485299997}  Liquids: {Slp liquid thickness:48085}  Daily Fluid Restriction: {CHP DME Yes amt example:700146634}  Last Modified Barium Swallow with Video (Video Swallowing Test): {Done Not Done BCSI:616705702}    Treatments at the Time of Hospital Discharge:   Respiratory Treatments: ***  Oxygen Therapy:  {Therapy; copd oxygen:76434}  Ventilator:    {SHIREEN KILPATRICK Vent RPPM:425367394}    Rehab Therapies: {THERAPEUTIC INTERVENTION:4622846988}  Weight Bearing Status/Restrictions: {SHIREEN KILPATRICK treatment of the diagnosis listed and that she requires Home Care for less 30 days.      Update Admission H&P: No change in H&P    PHYSICIAN SIGNATURE:  Electronically signed by ANABEL Pacheco on 10/21/20 at 5:21 PM EDT

## 2020-10-21 NOTE — PROGRESS NOTES
Physical Therapy    Facility/Department: [facility] C5 - MED SURG/ORTHO  Initial Assessment    NAME: Leland Wiggins  : 1940  MRN: [id number]    Date of Service: 10/21/2020    Discharge Recommendations:  24 hour supervision or assist, Home with Home health PT   PT Equipment Recommendations  Equipment Needed: No    Assessment   Body structures, Functions, Activity limitations: Decreased functional mobility ; Decreased ROM; Decreased strength;Decreased endurance;Decreased balance;Decreased posture  Assessment: Pt referred for PT evaluation during current hospital stay with dx of R distal humerus fx following a fall at home, now s/p ORIF of distal humerus on 10/20/20. Pt currently functioning below her baseline post-surgery, requiring CGA x 1 for gait training with use of cane and min-modA x 1 for portions of sit>stand transfers and stair training. Pt displays adequate safety and (I) to return home today, as she states she'll have assist x 2 family members to ascend steps into her house and 24-hr assist from spouse for household mobility. PT makes the following recommendations for safe D/C home:  Using cane at all times when transferring/ambulating and having  stand by for safety (until balance & confidence improve), only performing stair amb with 2 family members present, and home PT to assess home safety. Pt agreeable to all recommendations. Will continue to see pt in acute setting for strengthening and mobility training until formally D/C'd by ortho/medical teams. Treatment Diagnosis: Decreased strength and (I) with functional mobility s/p R distal humerus fx  Specific instructions for Next Treatment: Progress ther ex and mobility as tolerated  Prognosis: Good  Decision Making: Medium Complexity  PT Education: Goals; General Safety;Gait Training;PT Role;Plan of Care; Functional Mobility Training;Disease Specific Education;Equipment;Precautions;Transfer Training;Weight-bearing Education; Injury entry of therapy staff  Subjective  Subjective: Pt agreeable to work with PT this morning, pleasant and cooperative. Very appreciative of PT coming to see pt and work on ambulation. \"I feel like trying to walk, and I'm hoping I'll be able to go home later today. \"  Pain Screening  Patient Currently in Pain: Denies(pt denies pain at rest and without c/o pain during amb)  Intervention List: Patient able to continue with treatment    Orientation  Orientation  Overall Orientation Status: Within Normal Limits     Social/Functional History  Social/Functional History  Lives With: Spouse( (able to provide 24-hr sup/assist))  Type of Home: House  Home Layout: One level, Laundry in basement  Home Access: Stairs to enter with rails  Entrance Stairs - Number of Steps: 3 LESVIA  Entrance Stairs - Rails: Right(ascending)  Bathroom Shower/Tub: Tub/Shower unit(pt has been taking sponge baths since her injury)  Bathroom Toilet: Handicap height  Home Equipment: Cane(pt doesn't typically wear home O2)  ADL Assistance: Independent  Homemaking Assistance: Independent  Homemaking Responsibilities: Yes   Meal Prep Responsibility: Primary   Laundry Responsibility: Primary   Cleaning Responsibility: Primary   Shopping Responsibility: Primary  Ambulation Assistance: Independent(amb without AD in house, occasionally uses cane in community or in unfamiliar environments)  Transfer Assistance: Independent  Active : No  Patient's  Info:   Mode of Transportation: Family  Occupation: Retired  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[employer] [address]: Working in the yard, stitching, writing letters    Objective  Observation/Palpation  Posture: Fair    RLE AROM: WFL  LLE AROM : WFL  Strength RLE: WFL  Strength LLE: WFL     Bed mobility  Supine to Sit: Stand by assistance(moving toward L side, HOB elevated ~45 degrees, increased time needed to complete)  Scooting: Stand by assistance(to scoot forward to EOB) Transfers  Sit to Stand: Moderate Assistance(modA x 1 needed to stand from a variety of surfaces (EOB, chair, w/c) with min cues for technique and to push with LUE)  Stand to sit: Contact guard assistance(min cues needed for safety)  Bed to Chair: Contact guard assistance(using SBQC in L hand, moving from bed>chair>w/c>chair; min cues needed for safe technique)     Ambulation  WB Status: NWB RUE with sling  Surface: level tile  Device: Small Base Quad Cane(in L hand)  Assistance: Contact guard assistance  Quality of Gait: Pt amb with slow salinas with decreased step length and shuffling type gait at times. Gait speed and stride length increases slightly with repetition. Pt mildly unsteady although no overt LOB. CGA needed from therapist for safety. Gait Deviations: Slow Salnias;Decreased step length;Decreased step height; Increased ALBERT  Distance: x 3 feet, x 25 feet, x 30 feet, x 30 feet  Comments: Amb distance limited by c/o fatigue and mild SOB. Stairs/Curb  # Steps : 3  Stairs Height: 6\"  Rails: Right ascending  Device: No Device  Assistance: Minimal assistance; Moderate assistance  Comment: Pt amb up/down 3 steps using R handrail when ascending and L handrail when descending to mimic home setup. Pt requires min-modA x 1 to ascend steps and min/CGA x 1 when descending. Pt ascends best when side-stepping so she can effectively use LUE on handrail. Min cues needed for safety and proper technique. Fairly steady when navigating steps despite slow pace and need for assist.    Balance  Posture: Fair  Sitting - Static: Good  Sitting - Dynamic: Fair;+  Standing - Static: Fair;+(using SBQC)  Standing - Dynamic: Fair(using SBQC)    Plan   Times per week:  Once daily 7x/week  Times per day: Daily  Specific instructions for Next Treatment: Progress ther ex and mobility as tolerated  Current Treatment Recommendations: Strengthening, Balance Training, Endurance Training, Functional Mobility Training, Transfer

## 2020-10-21 NOTE — CARE COORDINATION
[facility]    Referral received from CM to follow for home care services. I will follow for needs, and speak with patient to verify demos.     Do not call until patient is home per patient request  CM will verify address and phone    Micaela Dubon RN, BSN CTN  [facility] [phone]

## 2020-10-23 ENCOUNTER — OFFICE VISIT (OUTPATIENT)
Dept: ORTHOPEDIC SURGERY | Age: 80
End: 2020-10-23
Payer: MEDICARE

## 2020-10-23 ENCOUNTER — HOSPITAL ENCOUNTER (OUTPATIENT)
Dept: OCCUPATIONAL THERAPY | Age: 80
Setting detail: THERAPIES SERIES
Discharge: HOME | End: 2020-10-23
Payer: MEDICARE

## 2020-10-23 VITALS — WEIGHT: 188 LBS | BODY MASS INDEX: 34.6 KG/M2 | HEIGHT: 62 IN

## 2020-10-23 PROCEDURE — 99024 POSTOP FOLLOW-UP VISIT: CPT | Performed by: ORTHOPAEDIC SURGERY

## 2020-10-23 PROCEDURE — 97112 NEUROMUSCULAR REEDUCATION: CPT | Performed by: OCCUPATIONAL THERAPIST

## 2020-10-23 PROCEDURE — 97110 THERAPEUTIC EXERCISES: CPT | Performed by: OCCUPATIONAL THERAPIST

## 2020-10-23 PROCEDURE — 97165 OT EVAL LOW COMPLEX 30 MIN: CPT | Performed by: OCCUPATIONAL THERAPIST

## 2020-10-23 PROCEDURE — L3760 EO ADJ JT PREFAB CUSTOM FIT: HCPCS | Performed by: ORTHOPAEDIC SURGERY

## 2020-10-23 NOTE — PROGRESS NOTES
I am seeing Ms. Baldwin 3 days after open reduction internal fixation of a right distal humerus fracture. She is doing great. She had a mild radial nerve palsy after surgery but this is entirely resolved. She is now neurovascular tact moving her hand well. Her incision is clean dry and intact. She is here to get her out of her splint and transition into a soft dressing with a hinged elbow brace. Her range of motion today actively is about 20-90 without much pain. We will set her hinged elbow brace to match her current motion so that she is not hyperextended or hyperflexed compared to what she is comfortable with this did not stress the repair. She will start working with occupational therapy today so that we get early motion of this fracture. She should not lift anything heavier than a single pound. No pushing pulling her body weight support with this extremity. Her range of motion is already excellent for this stage postoperatively so I would be somewhat gentle with her motion to make sure that we do not run into fixation failures, but my hope is for her to have a functional range of motion approximately 120 degrees of flexion to at least 20 degrees short of full extension which she has already achieved. Occupational therapy should advance the hinged brace settings as her motion improves with therapy. I like to see her back in 2 weeks time for postoperative radiographs and possible suture removal.  Of course if she is any issues in the meantime I am happy to see her sooner.

## 2020-10-23 NOTE — FLOWSHEET NOTE
[address] [facility], [address] [place] [address] Sreekanth Goodwin  Phone: [phone]  Fax [phone]    Occupational Therapy Treatment Note/ Progress Report:     Is this a Progress Report:     []  Yes  [x]  No      If Yes:  Date Range for reporting period:  Beginning 10/23/20  Ending 10/23/2020  Progress report will be due (10 Rx or 30 days whichever is less): 12/76/10    Recertification will be due (POC Duration  / 90 days whichever is less): 20   Date:  10/23/2020  Patient Name:  Trudy Balbuena    :  1940  MRN: [id number]  Medical/Treatment Diagnosis Information:  · Diagnosis: R distal humerus ORIF R elbow stiffness M25.621   ·       Insurance/Certification information:   [facility]  Physician Information:  Referring Practitioner: Dr. Lg Fall  Has the plan of care been signed (Y/N):        []  Yes  [x]  No   Comorbidities Affecting Functional Performance:     []Anxiety (F41.9)/Depression (F32.9)   []Diabetes Type 1(E10.65) or 2 (E11.65)   []Rheumatoid Arthritis (M05.9)  []Fibromyalgia (M79.7)  []Neuropathy(G60.9)  [x]Osteoarthritis(M19.91)  []None   [x]Other: A fib     Visit # Insurance Allowable Auth Required   1  []  Yes []  No    From 10/23/20  to 10/23/2020     Date of Injury: 10/14/20  Date of Surgery: 10/20/20     Date of Patient follow up with Physician:      RESTRICTIONS/PRECAUTIONS: gentle ROM only, no lifting or weight bearing      Latex Allergy:  [x]? No      []? Yes                    Pacemaker:  [x]? No       []? Yes      Preferred Language for Healthcare:   [x]? English       []? other:      Functional Scale:  77% (Quick DASH)                                Date assessed:  10/23/2020     C-SSRS Triggered by Intake questionnaire (Past 2 wk assessment):    No, Questionnaire did not trigger screening.      SUBJECTIVE: Patient reported deficits/history of current problem: PtAddie Jurado down stairs at home     OBJECTIVE: Provided verbal/tactile cueing for activities related to improving balance, coordination, kinesthetic sense, posture, motor skill, proprioception  to assist with  scapular, scapulothoracic and UE control with self care, reaching, carrying, lifting, house/yardwork, driving/computer work.     Therapeutic Activities & NMR:    [] (69631 or 74524) Provided verbal/tactile cueing for activities related to improving balance, coordination, kinesthetic sense, posture, motor skill, proprioception and motor activation to allow for proper function of scapular, scapulothoracic and UE control with self care, carrying, lifting, driving/computer work    Home Exercise Program:    [x] (67655) Reviewed/Progressed HEP activities related to strengthening, flexibility, endurance, ROM of scapular, scapulothoracic and UE control with self care, reaching, carrying, lifting, house/yardwork, driving/computer work  [] (94749) Reviewed/Progressed HEP activities related to improving balance, coordination, kinesthetic sense, posture, motor skill, proprioception of scapular, scapulothoracic and UE control with self care, reaching, carrying, lifting, house/yardwork, driving/computer work      Manual Treatments:  PROM / STM / Oscillations-Mobs:  G-I, II, III, IV (PA's, Inf., Post.)  [] (54073) Provided manual therapy to mobilize soft tissue/joints of cervical/CT, scapular GHJ and UE for the purpose of modulating pain, promoting relaxation,  increasing ROM, reducing/eliminating soft tissue swelling/inflammation/restriction, improving soft tissue extensibility and allowing for proper ROM for normal function with self care, reaching, carrying, lifting, house/yardwork, driving/computer work    ADL Training:  [] (91495) Provided self-care/home management training related to activities of daily living and compensatory training, and/or use of adaptive equipment      Charges:  Timed Code Treatment Minutes: 23   Total Treatment Minutes: 40   Worker's Comp: Time In/Time Out     [x] EVAL (LOW) 81735 (typically 20 minutes face-to-face)    [] EVAL (MOD) 90408 (typically 30 minutes face-to-face)  [] EVAL (HIGH) 56425 (typically 45 minutes face-to-face)  [] OT Re-eval (15306)       [x] Cindi (50707) x   1   [] LYZPE(34081)  [x] NMR (53489) x  1    [] Estim (attended) (35449)   [] Manual ([phone]) x      [] US (28528)  [] TA (54857) x      [] Paraffin (65480)  [] ADL  (28594) x     [] Splint/L code:    [] Estim (unattended) (24678)  [] Fluidotherapy (98705)  [] DN 1-2 (40958)   [] DN 3+ (50397)  [] Orthotic Mgmt, Subsequent Enc (41220)  [] Orthotic Mgmt & Training (26567)  [] Other:    ASSESSMENT:  Poor tolerance today. GOALS:  Patient stated goal: Be able to use her R arm     []? Progressing: []? Met: []? Not Met: []? Adjusted     Therapist goals for Patient:   Short Term Goals: To be achieved in: 2 weeks  1. Independent in HEP and progression per patient tolerance, in order to prevent re-injury. []? Progressing: []? Met: []? Not Met: []? Adjusted   2. Patient will have a decrease in pain to facilitate improvement in movement, function, and ADLs as indicated by Functional Deficits. []? Progressing: []? Met: []? Not Met: []? Adjusted     Long Term Goals to be achieved in 12 weeks (through 12/23/20), including patient directed goals to address patient identified performance deficits:  1) Pt to be independent in graded HEP progression with a good level of effort and compliance. []? Progressing: []? Met: []? Not Met: []? Adjusted   2) Pt to report a score of </= 25% on the Quick DASH disability questionnaire for increased performance with carrying, moving, and handling objects. []? Progressing: []? Met: []? Not Met: []? Adjusted   3) Pt will demonstrate increased ROM to R elbow to 20/110 for improved independence with reaching her face and hair for grooming . []? Progressing: []? Met: []? Not Met: []?  Adjusted   4) Pt will demonstrate increased strength to R  to

## 2020-10-23 NOTE — PLAN OF CARE
[facility], [address] [place] [address]  Phone: [phone]  Fax [phone]    Occupational Therapy/Hand Therapy Certification  Dear Referring Practitioner: Dr. Jose Antonio Mccloud    We had the pleasure of evaluating the following patient for occupational therapy services at [address] [place] [address]. A summary of our findings can be found in the initial assessment below. This includes our plan of care. If you have any questions or concerns regarding these findings, please do not hesitate to contact me at the office phone number checked above. Thank you for the referral.     Physician Signature:_______________________________Date:__________________  By signing above (or electronic signature), therapists plan is approved by physician      Patient: Derrick Abreu   : 1940   MRN: [id number]  Referring Physician: Referring Practitioner: Dr. Jose Antonio Mccloud      Evaluation Date: 10/23/2020      Medical Diagnosis Information:  Diagnosis: R distal humerus ORIF R elbow stiffness M25.621                                             Insurance information:  [facility]     Date of Injury: 10/14/20  Date of Surgery: 10/20/20    Date of Patient follow up with Physician:     RESTRICTIONS/PRECAUTIONS: gentle ROM only, no lifting or weight bearing     Latex Allergy:  [x]No      []Yes  Pacemaker:  [x] No       [] Yes     Preferred Language for Healthcare:   [x]English       []other:     Functional Scale:  77% (Quick DASH)   Date assessed:  10/23/2020    C-SSRS Triggered by Intake questionnaire (Past 2 wk assessment):    No, Questionnaire did not trigger screening. SUBJECTIVE: Patient reported deficits/history of current problem: Pt.  [place] down stairs at home     Pain Scale: 5/10  [x]Constant      []Intermittent    []other:  Pain Location:  Upper arm and elbow   Easing factors: pain med   Provocative factors: movement      [x] Patient reported history, allergies, and medications reviewed - see intake form. Occupational Profile:  Home Enviroment: lives with  [x] spouse,  [] family,  [] alone,  [] significant other,   [] other:    Occupation/School: retired     Recreational Activities/Meaningful Interests: cooking, [address] 20, running errands     Prior Level of Function: [x] Independent with ADLs/IADLs     [] Assistance needed (describe):    Patient-Identified Primary Performance Deficits (to be addressed in POC):   [] bathing    [x] household tasks (cooking/cleaning)   [x] dressing    [x] self feeding   [x] grooming    [] work/education   [] functional mobility   [] sleeping/rest   [] toileting/hygiene   [x] recreational activities   [x] driving    [x] community/social participation   [] other:     Comorbidities Affecting Functional Performance:     []Anxiety (F41.9)/Depression (F32.9)   []Diabetes Type 1(E10.65) or 2 (E11.65)   []Rheumatoid Arthritis (M05.9)  []Fibromyalgia (M79.7)  []Neuropathy(G60.9)  [x]Osteoarthritis(M19.91)  []None   [x]Other: A fib     OBJECTIVE:   Date:  Hand Dominance:     [x]  Right    [] Left 10/23/2020     Objective Measures:    PAIN 5/10   Quick DASH 77%                   Digits tip to DPFC in cm WFL    Thumb ROM MP  IP   Thumb opposition  R:  L:   Thumb Radial/Palmar abd ROM R:  L:   Wrist ROM Ext/Flex R: 50/59  L:   Rad/Uln dev ROM R:  L:   Forearm ROM  Sup/pron R: NT  L:   Elbow ROM Ext/flex R: 50/72  L: WNL   Shoulder Flex  Shoulder Abd  Shoulder IR/ER AAROM shoulder flexion 50 deg    Edema in cm circumf. MCPJs R: 19.0  L: 18.0    Edema in cm circumf. Wrist R:  L:    strength in lbs R:  L:   Pinch Strengthin lbs: lat  R:  L:   Pinch Strength in lbs:  3 point R:  L:     MMT:     Observations:  (including splints, bandages, incisions, scars): Pt painful and nervous to move. All measurements taken with brace on.       Sensation:  [x] No reported deficits  [] Intact to light touch    [] [place] test completed, findings as noted:  [] Other:      Functional Mobility/Transfers/Gait:  [] Independent - no significant gait deviations  [x] Assistance needed . . uses cane for balance   [] Assistive device used: Falls Risk Assessment (30 days):   [] Falls Risk assessed and no intervention required. [] Falls Risk assessed and Patient requires intervention due to being higher risk   TUG score (>12s at risk):     [x] Falls education provided, including PT for gait analysis and balance retraining       Review Of Systems (ROS): [x]Performed Review of systems (Integumentary, CardioPulmonary, Neurological) by intake and observation. Intake form has been scanned into medical record. Patient has been instructed to contact their primary care physician regarding ROS issues if not already being addressed at this time. ASSESSMENT:   This patient presents with signs and symptoms consistent with the medical diagnosis provided by the referring physician. Impairments (physical, cognitive and/or psychosocial):  [x] Decreased mobility   [x] Weakness    [x] Hypersensitivity   [x] Pain/tenderness   [x] Edema/swelling   [] Decreased coordination (fine/gross motor)   [] Impaired body mechanics  [] Sensory loss  [] Loss of balance   [] Other:        Rehab Potential:   [x] Excellent [] Good [] Fair  [] Poor     Barriers affecting rehab potential:  [x]Age    []Lack of Motivation   []Co-Morbidities  []Cognitive Function  []Environmental/home/work barriers  []Other:     Tolerance of evaluation/treatment:    [x] Excellent [] Good [] Fair  [] Poor    PLAN OF CARE:  Interventions:   [x] Therapeutic Exercise [x] Therapeutic Activity    [x] Activities of Daily Living [x] Neuromuscular Re-education      [x] Patient Education  [x] Manual Therapy      [x] Modalities as needed, and not otherwise contraindicated, including: ultrasound,paraffin,moist heat/cold pack, electrical stimulation, contrast bath, iontophoresis  [] Splinting    Frequency/Duration:  2 days per week for 12 weeks      GOALS:  Patient stated goal: Be able to use her R arm     [] Progressing: [] Met: [] Not Met: [] Adjusted    Therapist goals for Patient:   Short Term Goals: To be achieved in: 2 weeks  1. Independent in HEP and progression per patient tolerance, in order to prevent re-injury. [] Progressing: [] Met: [] Not Met: [] Adjusted   2. Patient will have a decrease in pain to facilitate improvement in movement, function, and ADLs as indicated by Functional Deficits. [] Progressing: [] Met: [] Not Met: [] Adjusted    Long Term Goals to be achieved in 12 weeks (through 12/23/20), including patient directed goals to address patient identified performance deficits:  1) Pt to be independent in graded HEP progression with a good level of effort and compliance. [] Progressing: [] Met: [] Not Met: [] Adjusted   2) Pt to report a score of </= 25% on the Quick DASH disability questionnaire for increased performance with carrying, moving, and handling objects. [] Progressing: [] Met: [] Not Met: [] Adjusted   3) Pt will demonstrate increased ROM to R elbow to 20/110 for improved independence with reaching her face and hair for grooming . [] Progressing: [] Met: [] Not Met: [] Adjusted   4) Pt will demonstrate increased strength to R  to 20# for improved independence with cooking. [] Progressing: [] Met: [] Not Met: [] Adjusted   5) Pt will have a decrease in pain to 0-3/10 to facilitate playing with grand kids .   [] Progressing: [] Met: [] Not Met: [] Adjusted        OCCUPATIONAL THERAPY EVALUATION COMPLEXITY JUSTIFICATION:    [x] An occupational profile and medical/therapy history, which includes:   [] a brief history including medical and/or therapy records relating to the     presenting problem   [x] an expanded review of medical and/or therapy records and additional review     of physical, cognitive or psychosocial history related to current functional    performance   [] an extensive additional review of review of medical and/or therapy records   and physical, cognitive, or psychosocial history related to current    functional performance    [x] An assessment that identifies performance deficits (relating to physical, cognitive, or psychosocial skills) that result in activity limitations and/or participation restrictions:   [] 1-3 performance deficits   [] 3-5 performance deficits   [x] 5 or more performance deficits    [x] Clinical decision making of:   [x] low complexity, including analysis of occupational profile, data analysis from problem focused assessment, and consideration of a limited number of treatment options. No comorbidities affect occupational performance. No task modifications or assistance needed to complete evaluation. [] moderate complexity, including analysis of occupational profile, data analysis from detailed assessment and consideration of several treatment options. Comorbidities that affect occupational performance may be present. Minimal to moderate task modifications or assistance needed to complete assessment. [] high complexity, including analysis of occupational profile, analysis of data from comprehensive assessment and consideration of multiple treatment options. Multiple comorbidities present that affect occupational performance. Significant task modifications or assistance needed to complete assessment.     Evaluation Code:  [x] Low Complexity EVAL 15644 (typically 30 minutes face to face)  [] Mod Complexity EVAL 28233 (typically 45 minutes face to face)  [] High Complexity EVAL 88507 (typically 60 minutes face to face)    Electronically signed by:  Lilly Ward  OTR/L [id number]

## 2020-10-27 ENCOUNTER — TELEPHONE (OUTPATIENT)
Dept: CARDIOLOGY CLINIC | Age: 80
End: 2020-10-27

## 2020-10-27 NOTE — TELEPHONE ENCOUNTER
Pt had surgery for broken arm. Pt has resumed her warfarin (COUMADIN), but wants to make sure she is taking correctly. Please advise.

## 2020-10-29 ENCOUNTER — ANTI-COAG VISIT (OUTPATIENT)
Dept: PHARMACY | Age: 80
End: 2020-10-29
Payer: MEDICARE

## 2020-10-29 LAB — INR BLD: 1.8

## 2020-10-29 PROCEDURE — 99211 OFF/OP EST MAY X REQ PHY/QHP: CPT

## 2020-10-29 PROCEDURE — 85610 PROTHROMBIN TIME: CPT

## 2020-10-29 NOTE — PROGRESS NOTES
Ms. Alexandro Parikh is here for management of anticoagulation for Afib. Patient was started on Warfarin on 8/6/15. PMH also significant for colonic polyp, and vertigo. She presents today w/out complaint. Pt verifies dosing regimen as listed above  No missed doses  Pt denies s/sx bleeding/swelling/SOB. No changes in Rx/OTC/herbal medications. Reports that she takes 1-2 Aleve here and there as needed. No tobacco use. She reports that she drinks one ETOH drink about every night. She had surgery last Wed d/t a significant fall. Held warfarin 3 days for this. Has some severe bruising from this but states it is improving. INR 1.8 is below therapeutic range of 2-3 (due to holding doses last week). Recommend to take 5 mg today only then continue 5 mg Mon/Wed/Fri; and 2.5 mg all other days. Patient has 5 mg tablets. Will continue to monitor and check INR in 2 weeks, per patient request. (She prefers not to push her appointment out any further than 4 weeks if possible)  Dosing reminder card given with phone number, appointment date and time.    Return to clinic: 11/12 @ [phone] am   Referring MD: Dr. Veronica Castellon, PharmD 10/29/20  11:52 AM

## 2020-10-30 ENCOUNTER — HOSPITAL ENCOUNTER (OUTPATIENT)
Dept: OCCUPATIONAL THERAPY | Age: 80
Setting detail: THERAPIES SERIES
Discharge: HOME | End: 2020-10-30
Payer: MEDICARE

## 2020-10-30 PROCEDURE — 97110 THERAPEUTIC EXERCISES: CPT | Performed by: OCCUPATIONAL THERAPIST

## 2020-10-30 PROCEDURE — 97112 NEUROMUSCULAR REEDUCATION: CPT | Performed by: OCCUPATIONAL THERAPIST

## 2020-10-30 NOTE — FLOWSHEET NOTE
[x]?  Right    []? Left 10/23/2020      Objective Measures:     PAIN 5/10   Quick DASH 77%                           Digits tip to DPFC in cm WFL    Thumb ROM MP  IP   Thumb opposition  R:  L:   Thumb Radial/Palmar abd ROM R:  L:   Wrist ROM Ext/Flex R: 50/59  L:   Rad/Uln dev ROM R:  L:   Forearm ROM  Sup/pron R: NT  L:   Elbow ROM Ext/flex R: 50/72  L: WNL   Shoulder Flex  Shoulder Abd  Shoulder IR/ER AAROM shoulder flexion 50 deg    Edema in cm circumf. MCPJs R: 19.0  L: 18.0    Edema in cm circumf. Wrist R:  L:    strength in lbs R:  L:   Pinch Strengthin lbs: lat  R:  L:   Pinch Strength in lbs:  3 point R:  L:      MMT:      Observations:  (including splints, bandages, incisions, scars): Pt painful and nervous to move. All measurements taken with brace on.                 MODALITIES: 10/23/20  10/30/20     Fluidotherapy (83573)      Estim (93112/27642)      Paraffin (97926)      US (32875)      Iontophoresis (36092)      Hot Pack      Cold Pack  10'          INTERVENTIONS:      Therapeutic Exercise (13465) Issued AROM, shoulder, elbow, wrist FA, and hand . Pt. Attempted demo of each with difficulty and pain  AAROM shoulder x 5 (difficult)    AROM elbow, forearm wrist and hand x 10 each direction gentle and with cues     Shoulder shrugs  X 5  X 10     SS  X 10                 Therapeutic Activity (18680)                  Manual Therapy (78922)      (IASTM, Dry Needling, manual mobilization)            Neuromuscular Reeducation (60942) Education   Cues for exercises. Education and cues                 ADL Training (21935)                  HEP Training/Review See sheet(s)      See media file            Splinting      Lcode:      Orthotic Mgmt, Subsequent Enc (66851)      Orthotic Mgmt & Training (72009)            Other:  Redness and warmth noted in volar elbow, incsion is not red. Contacted MD and he moved her appointment from next Monday to this Monday.        Therapeutic Exercise & NMR:  [x] (57604) Training:  [] (35365) Provided self-care/home management training related to activities of daily living and compensatory training, and/or use of adaptive equipment      Charges:  Timed Code Treatment Minutes: 40   Total Treatment Minutes: 50   Worker's Comp: Time In/Time Out     [] EVAL (LOW) 70935 (typically 20 minutes face-to-face)    [] EVAL (MOD) 84582 (typically 30 minutes face-to-face)  [] EVAL (HIGH) 48676 (typically 45 minutes face-to-face)  [] OT Re-eval (77328)       [x] Cindi [phone]) x   2   [] TSYFF(49125)  [x] NMR (81360) x  1    [] Estim (attended) (25038)   [] Manual ([phone]) x      [] US (10215)  [] TA () x      [] Paraffin (35272)  [] ADL  ([phone]) x     [] Splint/L code:    [] Estim (unattended) [phone])  [] Fluidotherapy (58564)  [] DN 1-2 (45996)   [] DN 3+ (84833)  [] Orthotic Mgmt, Subsequent Enc (95981)  [] Orthotic Mgmt & Training (64213)  [] Other:    ASSESSMENT:  Poor tolerance today. GOALS:  Patient stated goal: Be able to use her R arm     []? Progressing: []? Met: []? Not Met: []? Adjusted     Therapist goals for Patient:   Short Term Goals: To be achieved in: 2 weeks  1. Independent in HEP and progression per patient tolerance, in order to prevent re-injury. []? Progressing: []? Met: []? Not Met: []? Adjusted   2. Patient will have a decrease in pain to facilitate improvement in movement, function, and ADLs as indicated by Functional Deficits. []? Progressing: []? Met: []? Not Met: []? Adjusted     Long Term Goals to be achieved in 12 weeks (through 12/23/20), including patient directed goals to address patient identified performance deficits:  1) Pt to be independent in graded HEP progression with a good level of effort and compliance. []? Progressing: []? Met: []? Not Met: []? Adjusted   2) Pt to report a score of </= 25% on the Quick DASH disability questionnaire for increased performance with carrying, moving, and handling objects. []? Progressing: []? Met: []?  Not Met: []? Adjusted   3) Pt will demonstrate increased ROM to R elbow to 20/110 for improved independence with reaching her face and hair for grooming . []? Progressing: []? Met: []? Not Met: []? Adjusted   4) Pt will demonstrate increased strength to R  to 20# for improved independence with cooking. []? Progressing: []? Met: []? Not Met: []? Adjusted   5) Pt will have a decrease in pain to 0-3/10 to facilitate playing with grand kids . []? Progressing: []? Met: []? Not Met: []? Adjusted                 Overall Progression Towards Functional Goals/Treatment Progress Update:  [] Patient is progressing as expected towards functional goals listed. [] Progression is slowed due to complexities/impairments listed. [] Progression has been slowed due to co-morbidities. [x] Plan just implemented, too soon to assess goals progression <30 days  [] Goals require adjustment due to lack of progress  [] Patient is not progressing as expected and requires additional follow up with physician  [] All goals are met  [] Other:     Prognosis for POC: [x] Good [] Fair  [] Poor    Patient requires continued skilled intervention: [x] Yes  [] No    Treatment/Activity Tolerance:  [x] Patient able to complete treatment  [] Patient limited by fatigue  [] Patient limited by pain    [] Patient limited by other medical complications  [] Other:                  PLAN: See eval  [] Continue per plan of care [] Alter current plan (see comments above)  [x] Plan of care initiated [] Hold pending MD visit [] Discharge    Electronically signed by:  Rico Joy OTR/L [id number]      Note: If patient does not return for scheduled/ recommended follow up visits, this note will serve as a discharge from care along with most recent update on progress.

## 2020-11-02 ENCOUNTER — OFFICE VISIT (OUTPATIENT)
Dept: ORTHOPEDIC SURGERY | Age: 80
End: 2020-11-02

## 2020-11-02 VITALS — WEIGHT: 188 LBS | BODY MASS INDEX: 34.6 KG/M2 | HEIGHT: 62 IN

## 2020-11-02 PROCEDURE — 99024 POSTOP FOLLOW-UP VISIT: CPT | Performed by: ORTHOPAEDIC SURGERY

## 2020-11-02 NOTE — PROGRESS NOTES
Seems to listen to follow-up because of concern for cellulitis from her therapist.  She has very minimal redness about her arm and certainly no tenderness to palpation or other symptoms consistent with cellulitis. Her arm is quite swollen as would be expected this short postoperatively after major surgery. We will wrap her from hand up to her shoulder and raise her back in her hinged elbow brace. She will continue be nonweightbearing and work on her gentle elbow range of motion from stiffness. I like to see her back as scheduled for her next postop visit which point we will obtain follow-up radiographs.

## 2020-11-04 NOTE — TELEPHONE ENCOUNTER
Called and spoke with patient. Clarified instructions on how patient should be taking medication per NP BB written Rx order. Pt v/u of instructions on medication.

## 2020-11-06 ENCOUNTER — HOSPITAL ENCOUNTER (OUTPATIENT)
Dept: OCCUPATIONAL THERAPY | Age: 80
Setting detail: THERAPIES SERIES
Discharge: HOME | End: 2020-11-06
Payer: MEDICARE

## 2020-11-06 PROCEDURE — 97140 MANUAL THERAPY 1/> REGIONS: CPT | Performed by: OCCUPATIONAL THERAPIST

## 2020-11-06 PROCEDURE — 97110 THERAPEUTIC EXERCISES: CPT | Performed by: OCCUPATIONAL THERAPIST

## 2020-11-06 PROCEDURE — 97535 SELF CARE MNGMENT TRAINING: CPT | Performed by: OCCUPATIONAL THERAPIST

## 2020-11-06 NOTE — FLOWSHEET NOTE
[address] [facility], [address] [place] [address] Sreekanth Goodwin  Phone: [phone]  Fax [phone]    Occupational Therapy Treatment Note/ Progress Report:     Is this a Progress Report:     []  Yes  [x]  No      If Yes:  Date Range for reporting period:  Beginning 10/23/20  Ending 2020  Progress report will be due (10 Rx or 30 days whichever is less):     Recertification will be due (POC Duration  / 90 days whichever is less): 20   Date:  2020  Patient Name:  Jeremías Sharma    :  1940  MRN: [id number]  Medical/Treatment Diagnosis Information:  · Diagnosis: R distal humerus ORIF R elbow stiffness M25.621   ·       Insurance/Certification information:    STEVEN neal  Physician Information:  Referring Practitioner: Dr. Elise Dodge  Has the plan of care been signed (Y/N):        []  Yes  [x]  No   Comorbidities Affecting Functional Performance:     []Anxiety (F41.9)/Depression (F32.9)   []Diabetes Type 1(E10.65) or 2 (E11.65)   []Rheumatoid Arthritis (M05.9)  []Fibromyalgia (M79.7)  []Neuropathy(G60.9)  [x]Osteoarthritis(M19.91)  []None   [x]Other: A fib     Visit # Insurance Allowable Auth Required   3  []  Yes []  No    From 10/23/20  to 2020     Date of Injury: 10/14/20  Date of Surgery: 10/20/20     Date of Patient follow up with Physician:      RESTRICTIONS/PRECAUTIONS: gentle ROM only, no lifting or weight bearing      Latex Allergy:  [x]? No      []? Yes                    Pacemaker:  [x]? No       []? Yes      Preferred Language for Healthcare:   [x]? English       []? other:      Functional Scale:  77% (Quick DASH)                                Date assessed:  10/23/2020     C-SSRS Triggered by Intake questionnaire (Past 2 wk assessment):    No, Questionnaire did not trigger screening.      SUBJECTIVE: Tolerating exercises better. MD does not think redness warrants medication. He sees her next week also. OBJECTIVE:   Date:  Hand Dominance:     [x]? Right    []? Left 10/23/2020      Objective Measures:     PAIN 5/10   Quick DASH 77%                           Digits tip to DPFC in cm WFL    Thumb ROM MP  IP   Thumb opposition  R:  L:   Thumb Radial/Palmar abd ROM R:  L:   Wrist ROM Ext/Flex R: 50/59  L:   Rad/Uln dev ROM R:  L:   Forearm ROM  Sup/pron R: NT  L:   Elbow ROM Ext/flex R: 50/72  L: WNL   Shoulder Flex  Shoulder Abd  Shoulder IR/ER AAROM shoulder flexion 50 deg    Edema in cm circumf. MCPJs R: 19.0  L: 18.0    Edema in cm circumf. Wrist R:  L:    strength in lbs R:  L:   Pinch Strengthin lbs: lat  R:  L:   Pinch Strength in lbs:  3 point R:  L:      MMT:      Observations:  (including splints, bandages, incisions, scars): Pt painful and nervous to move. All measurements taken with brace on.                 MODALITIES: 10/23/20  10/30/20  11/6/20    Fluidotherapy (21591)      Estim (38551/86149)      Paraffin (73133)      US (02345)      Iontophoresis (89888)      Hot Pack      Cold Pack  10'          INTERVENTIONS:      Therapeutic Exercise (70680) Issued AROM, shoulder, elbow, wrist FA, and hand . Pt. Attempted demo of each with difficulty and pain  AAROM shoulder x 5 (difficult)    AROM elbow, forearm wrist and hand x 10 each direction gentle and with cues  AAROM shoulder x 5    AAROM by therapist x 5    Unable to initiate shoulder flexion on her own      AROM elbow, forearm and hand x 15 each    Shoulder shrugs  X 5  X 10  X 10    SS  X 10  X 10                Therapeutic Activity (11493)                  Manual Therapy (42987)   Retrograde mass to hand and forearm 8'   (IASTM, Dry Needling, manual mobilization)            Neuromuscular Reeducation (83498) Education   Cues for exercises. Education and cues  Finisher table no resistance ladder climb and circles x 10 each                ADL Training (63924)   Practiced getting brace on and off.  Min A                HEP Training/Review See sheet(s)      See media file            Splinting      Lcode:      Orthotic Mgmt, Subsequent Enc (21099)      Orthotic Mgmt & Training (11754)            Other:  Redness and warmth noted in volar elbow, incsion is not red. Contacted MD and he moved her appointment from next Monday to this Monday. Less redness today. Therapeutic Exercise & NMR:  [x] (79317) Provided verbal/tactile cueing for activities related to strengthening, flexibility, endurance, ROM  for improvements in scapular, scapulothoracic and UE control with self care, reaching, carrying, lifting, house/yardwork, driving/computer work. [x] (47975) Provided verbal/tactile cueing for activities related to improving balance, coordination, kinesthetic sense, posture, motor skill, proprioception  to assist with  scapular, scapulothoracic and UE control with self care, reaching, carrying, lifting, house/yardwork, driving/computer work.     Therapeutic Activities & NMR:    [] (47621 or 03573) Provided verbal/tactile cueing for activities related to improving balance, coordination, kinesthetic sense, posture, motor skill, proprioception and motor activation to allow for proper function of scapular, scapulothoracic and UE control with self care, carrying, lifting, driving/computer work    Home Exercise Program:    [x] (71655) Reviewed/Progressed HEP activities related to strengthening, flexibility, endurance, ROM of scapular, scapulothoracic and UE control with self care, reaching, carrying, lifting, house/yardwork, driving/computer work  [] (47666) Reviewed/Progressed HEP activities related to improving balance, coordination, kinesthetic sense, posture, motor skill, proprioception of scapular, scapulothoracic and UE control with self care, reaching, carrying, lifting, house/yardwork, driving/computer work      Manual Treatments:  PROM / STM / Oscillations-Mobs:  G-I, II, III, IV (PA's, Inf., Post.)  [x] (83284) Provided manual therapy to mobilize soft tissue/joints of cervical/CT, scapular GHJ and UE for the purpose of modulating pain, promoting relaxation,  increasing ROM, reducing/eliminating soft tissue swelling/inflammation/restriction, improving soft tissue extensibility and allowing for proper ROM for normal function with self care, reaching, carrying, lifting, house/yardwork, driving/computer work    ADL Training:  [] (32737) Provided self-care/home management training related to activities of daily living and compensatory training, and/or use of adaptive equipment      Charges:  Timed Code Treatment Minutes: 45   Total Treatment Minutes: 45   Worker's Comp: Time In/Time Out     [] EVAL (LOW) 95672 (typically 20 minutes face-to-face)    [] EVAL (MOD) 79248 (typically 30 minutes face-to-face)  [] EVAL (HIGH) 76959 (typically 45 minutes face-to-face)  [] OT Re-eval (75583)       [x] Cindi ([id number]) x   1   [] TNCJZ(00429)  [] NMR (19469) x     [] Estim (attended) (11473)   [x] Manual ([id number]) x 1     [] US (85132)  [] TA (73468) x      [] Paraffin (01993)  [x] ADL  (59425) x  1   [] Splint/L code:    [] Estim (unattended) ([id number])  [] Fluidotherapy (13581)  [] DN 1-2 (18728)   [] DN 3+ (38468)  [] Orthotic Mgmt, Subsequent Enc (97684)  [] Orthotic Mgmt & Training (66414)  [] Other:    ASSESSMENT:  Poor tolerance today. GOALS:  Patient stated goal: Be able to use her R arm     []? Progressing: []? Met: []? Not Met: []? Adjusted     Therapist goals for Patient:   Short Term Goals: To be achieved in: 2 weeks  1. Independent in HEP and progression per patient tolerance, in order to prevent re-injury. []? Progressing: []? Met: []? Not Met: []? Adjusted   2. Patient will have a decrease in pain to facilitate improvement in movement, function, and ADLs as indicated by Functional Deficits. []? Progressing: []? Met: []? Not Met: []?  Adjusted     Long Term Goals to be achieved in 12 weeks (through 12/23/20), including patient directed goals to address patient identified performance deficits:  1) Pt to be independent in graded HEP progression with a good level of effort and compliance. []? Progressing: []? Met: []? Not Met: []? Adjusted   2) Pt to report a score of </= 25% on the Quick DASH disability questionnaire for increased performance with carrying, moving, and handling objects. []? Progressing: []? Met: []? Not Met: []? Adjusted   3) Pt will demonstrate increased ROM to R elbow to 20/110 for improved independence with reaching her face and hair for grooming . []? Progressing: []? Met: []? Not Met: []? Adjusted   4) Pt will demonstrate increased strength to R  to 20# for improved independence with cooking. []? Progressing: []? Met: []? Not Met: []? Adjusted   5) Pt will have a decrease in pain to 0-3/10 to facilitate playing with grand kids . []? Progressing: []? Met: []? Not Met: []? Adjusted                 Overall Progression Towards Functional Goals/Treatment Progress Update:  [] Patient is progressing as expected towards functional goals listed. [] Progression is slowed due to complexities/impairments listed. [] Progression has been slowed due to co-morbidities.   [x] Plan just implemented, too soon to assess goals progression <30 days  [] Goals require adjustment due to lack of progress  [] Patient is not progressing as expected and requires additional follow up with physician  [] All goals are met  [] Other:     Prognosis for POC: [x] Good [] Fair  [] Poor    Patient requires continued skilled intervention: [x] Yes  [] No    Treatment/Activity Tolerance:  [x] Patient able to complete treatment  [] Patient limited by fatigue  [] Patient limited by pain    [] Patient limited by other medical complications  [] Other:                  PLAN: See eval  [] Continue per plan of care [] Alter current plan (see comments above)  [x] Plan of care initiated [] Hold pending MD visit [] Discharge    Electronically signed by:  Dionte Ray OTR/L [id number]      Note: If patient does not return for scheduled/ recommended follow up visits, this note will serve as a discharge from care along with most recent update on progress.

## 2020-11-09 ENCOUNTER — OFFICE VISIT (OUTPATIENT)
Dept: ORTHOPEDIC SURGERY | Age: 80
End: 2020-11-09

## 2020-11-09 VITALS — HEIGHT: 62 IN | WEIGHT: 188 LBS | BODY MASS INDEX: 34.6 KG/M2

## 2020-11-09 PROCEDURE — 99024 POSTOP FOLLOW-UP VISIT: CPT | Performed by: ORTHOPAEDIC SURGERY

## 2020-11-09 RX ORDER — OXYCODONE HYDROCHLORIDE AND ACETAMINOPHEN 5; 325 MG/1; MG/1
1 TABLET ORAL EVERY 4 HOURS PRN
Qty: 42 TABLET | Refills: 0 | Status: SHIPPED | OUTPATIENT
Start: 2020-11-09 | End: 2020-11-16

## 2020-11-09 NOTE — PROGRESS NOTES
I am seeing Ms. Marshal Foster in follow-up today. She is 2 weeks postop from an ORIF of right distal humerus that is extra-articular. Unfortunately noticed today that she came in and was leaning on her cane on the side. She was instructed to be strict nonweightbearing but I said it is okay for her to lift something like a spoon to her mouth for eating and for self hygiene. On physical exam her incision is nicely healed and she is neurovascular intact distally. She has range of motion from approximately 90 to 100 degrees of flexion to about 15 degrees short of full extension. She has no increasing pain and has been only getting better in the last couple of weeks. Radiographic imaging obtained today includes 2 views of the humerus and 3 views of the elbow. Unfortunately these do seem to show loss of distal fixation and pullout of the interfragmentary lag screw. However the views are quite different to my fluoroscopic views this could be projectional.    Assessment and plan: 26-year-old female who is 2 weeks status post right distal humerus that is extra-articular open reduction internal fixation. Unfortunately I do not think she clearly understood my postoperative instructions despite my best efforts to explain them. This may have resulted in failure of fixation of this fracture. She does still have pretty good motion for this for out does not have an increasing pain so at this point as I reiterated and we went over the instructions again on the fact that she needs to be careful with this. I will discuss her current x-rays of my colleagues as well as her clinical exam and see if she needs to be revised. Like to see her back in 1 week for repeat imaging to make sure that this is stable and for further discussion of next steps.

## 2020-11-12 ENCOUNTER — ANTI-COAG VISIT (OUTPATIENT)
Dept: PHARMACY | Age: 80
End: 2020-11-12
Payer: MEDICARE

## 2020-11-12 LAB — INR BLD: 4.8

## 2020-11-12 PROCEDURE — 85610 PROTHROMBIN TIME: CPT

## 2020-11-12 PROCEDURE — 99211 OFF/OP EST MAY X REQ PHY/QHP: CPT

## 2020-11-12 NOTE — PROGRESS NOTES
Ms. Guanako Deleon is here for management of anticoagulation for Afib. Patient was started on Warfarin on 8/6/15. PMH also significant for colonic polyp, and vertigo. She presents today w/out complaint. Pt verifies dosing regimen as listed above  No missed doses  Pt denies s/sx bleeding/swelling/SOB. No changes in Rx/OTC/herbal medications. Reports that she takes 1-2 Aleve here and there as needed. No tobacco use. She reports that she drinks one ETOH drink about every night. Pt had a severe fall and surgery before last visit and since then has been taking percocet a few times a day (max of 3 times daily, sometimes less). Also states she hasn't had much of an appetite lately and doesn't eat as much food or greens as she used to, some days she would go all day eating almost nothing and didn't realize it. Likely why the INR is high today. Recommended trying to go back to having ~2 servings of greens per week and eating a little more throughout the day as tolerated. INR 4.8 is above therapeutic range of 2-3. Recommend to skip dose today only then reduce dose to 5 mg Mon/Wed and 2.5 mg all other days. Patient has 5 mg tablets. Will continue to monitor and check INR in 1 week. (She prefers not to push her appointment out any further than 4 weeks if possible)  Dosing reminder card given with phone number, appointment date and time.    Return to clinic: 11/19 @ [phone] am   Referring MD: Dr. Braydon Mckee, PharmD 11/12/20  12:08 PM

## 2020-11-13 ENCOUNTER — OFFICE VISIT (OUTPATIENT)
Dept: ORTHOPEDIC SURGERY | Age: 80
End: 2020-11-13

## 2020-11-13 ENCOUNTER — APPOINTMENT (OUTPATIENT)
Dept: OCCUPATIONAL THERAPY | Age: 80
End: 2020-11-13
Payer: MEDICARE

## 2020-11-13 VITALS — HEIGHT: 62 IN | BODY MASS INDEX: 34.6 KG/M2 | WEIGHT: 188 LBS

## 2020-11-13 PROCEDURE — 99024 POSTOP FOLLOW-UP VISIT: CPT | Performed by: ORTHOPAEDIC SURGERY

## 2020-11-13 NOTE — PROGRESS NOTES
Chief Complaint   Patient presents with    Arm Pain     S/P 10/20/2020 ORIF RIGHT DISTAL HUMERUS BY DR. Magdalene Miller       HISTORY OF PRESENT ILLNESS:  Nunu Kitchen is a 67 y.o.  patient here for repeat x-ray evaluation after sustaining a significant right distal humerus fracture. She is just over 3 weeks following ORIF of a distal humerus with my colleague Dr. Neela Hirsch. Dr. Neela Hirsch reached out to inquire if I would see the patient regarding the case, fracture alignment and clinical setting. Patient comes in stating that her pain has gone away nicely. She denies numbness in the hand. ROS:  ROS neg     Past medical history is reviewed again today, no changes to report    PHYSICAL EXAMINATION:  Patient is alert and pleasant, in no acute distress. The affected extremity is examined today. Right arm is well aligned clinically, wound appears healed without sign of infection or dehiscence. No erythema locally or streaking. Compartments are soft. She has an intact neurovascular exam in the right hand including radial nerve and ulnar nerve function. She has some hand stiffness but no guarding. Strength about the elbow not tested today    X-rays:  3 views, right humerus, impression:  Distal humerus fracture appears to be maintained in terms of alignment from the operating room. Fracture has alignment that I think will heal well and will help her functionally down the road. IMPRESSION AND PLAN: Right humerus fracture  Doing well after sustaining a significant right arm injury treated surgically. Wound appears excellent and she is neurovascularly intact. X-rays reveal maintained alignment, patient is doing well clinically also. We will continue with our current care plan. Postoperative wound care was discussed with the patient today. Staples were removed without difficulty. Steri-Strips were applied (as long as no allergy) to help prevent wound dehiscence.   Appropriate monitoring and care of the wound, including cleansing, was outlined with the patient today. We discussed appropriate activity limitations and modifications over the next couple weeks to prevent wound complication, such as dehiscence. The patient understands to contact my office if having wound problems. These would include, but not limited to, erythema, new swelling or pain, dehiscence, signs of infection. She is permitted out of her brace to shower now. But she understands no elevation of the arm, no overhead activities, no pushing off, no strengthening. She can continue some gentle motion within the confines of the brace. Unlimited motion of the hand and wrist.  Edema sleeve provided and compression and edema control outlined. I will have the patient be seen by my colleague Dr. Leslie Palomares in in 3 weeks with x-rays of the right humerus. If she is having new pain, new swelling or has any trouble, she will contact us for an appointment sooner. All questions and concerns were addressed today. Patient is in agreement with the plan. Abhilash Chery MD  Hand & Upper Extremity Surgery  [address]  A partner of [facility] ([facility])        Please note that this transcription was created using voice recognition software. Any errors are unintentional and may be due to voice recognition transcription.

## 2020-11-19 ENCOUNTER — ANTI-COAG VISIT (OUTPATIENT)
Dept: PHARMACY | Age: 80
End: 2020-11-19
Payer: MEDICARE

## 2020-11-19 LAB — INR BLD: 2.9

## 2020-11-19 PROCEDURE — 99211 OFF/OP EST MAY X REQ PHY/QHP: CPT

## 2020-11-19 PROCEDURE — 85610 PROTHROMBIN TIME: CPT

## 2020-11-20 ENCOUNTER — HOSPITAL ENCOUNTER (OUTPATIENT)
Dept: OCCUPATIONAL THERAPY | Age: 80
Setting detail: THERAPIES SERIES
Discharge: HOME | End: 2020-11-20
Payer: MEDICARE

## 2020-11-20 PROCEDURE — 97110 THERAPEUTIC EXERCISES: CPT | Performed by: OCCUPATIONAL THERAPIST

## 2020-11-20 PROCEDURE — 97140 MANUAL THERAPY 1/> REGIONS: CPT | Performed by: OCCUPATIONAL THERAPIST

## 2020-11-20 PROCEDURE — 97112 NEUROMUSCULAR REEDUCATION: CPT | Performed by: OCCUPATIONAL THERAPIST

## 2020-11-20 NOTE — FLOWSHEET NOTE
[address] [facility], [address] [place] [address] Sreekanth Goodwin  Phone: [phone]  Fax [phone]    Occupational Therapy Treatment Note/ Progress Report:     Is this a Progress Report:     []  Yes  [x]  No      If Yes:  Date Range for reporting period:  Beginning 10/23/20  Ending 2020  Progress report will be due (10 Rx or 30 days whichever is less):     Recertification will be due (POC Duration  / 90 days whichever is less): 20   Date:  2020  Patient Name:  Derrick Abreu    :  1940  MRN: [id number]  Medical/Treatment Diagnosis Information:  · Diagnosis: R distal humerus ORIF R elbow stiffness M25.621   ·       Insurance/Certification information:   [facility]  Physician Information:  Referring Practitioner: Dr. Jose Antonio Mccloud  Has the plan of care been signed (Y/N):        []  Yes  [x]  No   Comorbidities Affecting Functional Performance:     []Anxiety (F41.9)/Depression (F32.9)   []Diabetes Type 1(E10.65) or 2 (E11.65)   []Rheumatoid Arthritis (M05.9)  []Fibromyalgia (M79.7)  []Neuropathy(G60.9)  [x]Osteoarthritis(M19.91)  []None   [x]Other: A fib     Visit # Insurance Allowable Auth Required   4  []  Yes []  No    From 10/23/20  to 2020     Date of Injury: 10/14/20  Date of Surgery: 10/20/20     Date of Patient follow up with Physician:      RESTRICTIONS/PRECAUTIONS: gentle ROM only, no lifting or weight bearing      Latex Allergy:  [x]? No      []? Yes                    Pacemaker:  [x]? No       []? Yes      Preferred Language for Healthcare:   [x]? English       []? other:      Functional Scale:  77% (Quick DASH)                                Date assessed:  10/23/2020     C-SSRS Triggered by Intake questionnaire (Past 2 wk assessment):    No, Questionnaire did not trigger screening.      SUBJECTIVE:   MD reports that the x rays  May show loss of distal fixation and pullout of the interfragmentary lag becky. Referred pt. To Dr. Quentin Hernandez who said that she could continue gentle ROM of elbow, wrist and and hand but no over head act. OBJECTIVE:   Date:  Hand Dominance:     [x]? Right    []? Left 10/23/2020    11/20/20    Objective Measures:      PAIN 5/10 4/10    Quick DASH 77%                                Digits tip to DPFC in cm WFL     Thumb ROM MP  IP    Thumb opposition  R:  L:    Thumb Radial/Palmar abd ROM R:  L:    Wrist ROM Ext/Flex R: 50/59  L: WNL   Rad/Uln dev ROM R:  L:    Forearm ROM  Sup/pron R: NT  L: WNL   Elbow ROM Ext/flex R: 50/72  L: WNL 30/90   Shoulder Flex  Shoulder Abd  Shoulder IR/ER AAROM shoulder flexion 50 deg  NT. Edema in cm circumf. MCPJs R: 19.0  L: 18.0     Edema in cm circumf. Wrist R:  L:     strength in lbs R:  L:    Pinch Strengthin lbs: lat  R:  L:    Pinch Strength in lbs:  3 point R:  L:       MMT:       Observations:  (including splints, bandages, incisions, scars): Pt painful and nervous to move. All measurements taken with brace on.                  MODALITIES: 10/23/20  10/30/20  11/6/20  11/20/20    Fluidotherapy (85380)       Estim (16756/80360)       Paraffin (19943)       US (55600)       Iontophoresis (51038)       Hot Pack       Cold Pack  10'            INTERVENTIONS:       Therapeutic Exercise (95080) Issued AROM, shoulder, elbow, wrist FA, and hand . Pt.  Attempted demo of each with difficulty and pain  AAROM shoulder x 5 (difficult)    AROM elbow, forearm wrist and hand x 10 each direction gentle and with cues  AAROM shoulder x 5    AAROM by therapist x 5    Unable to initiate shoulder flexion on her own      AROM elbow, forearm and hand x 15 each  Scrunching towell with fingers    Wrist cisor 5'    Yellow digiflex  X 20    Shoulder shrugs  X 5  X 10  X 10  X 10    SS  X 10  X 10  X 10                 Therapeutic Activity (82215)                     Manual Therapy (11003)   Retrograde mass to hand and forearm 8' 8'   (IASTM, Dry Needling, manual mobilization)              Neuromuscular Reeducation (47818) Education   Cues for exercises. Education and cues  Finisher table no resistance ladder climb and circles x 10 each                   ADL Training (52273)   Practiced getting brace on and off. Min A                   HEP Training/Review See sheet(s)       See media file              Splinting       Lcode:       Orthotic Mgmt, Subsequent Enc (69352)       Orthotic Mgmt & Training (21986)              Other:  Redness and warmth noted in volar elbow, incsion is not red. Contacted MD and he moved her appointment from next Monday to this Monday. Less redness today. Therapeutic Exercise & NMR:  [x] (70707) Provided verbal/tactile cueing for activities related to strengthening, flexibility, endurance, ROM  for improvements in scapular, scapulothoracic and UE control with self care, reaching, carrying, lifting, house/yardwork, driving/computer work. [x] (22542) Provided verbal/tactile cueing for activities related to improving balance, coordination, kinesthetic sense, posture, motor skill, proprioception  to assist with  scapular, scapulothoracic and UE control with self care, reaching, carrying, lifting, house/yardwork, driving/computer work.     Therapeutic Activities & NMR:    [] (87009 or 26523) Provided verbal/tactile cueing for activities related to improving balance, coordination, kinesthetic sense, posture, motor skill, proprioception and motor activation to allow for proper function of scapular, scapulothoracic and UE control with self care, carrying, lifting, driving/computer work    Home Exercise Program:    [x] (09718) Reviewed/Progressed HEP activities related to strengthening, flexibility, endurance, ROM of scapular, scapulothoracic and UE control with self care, reaching, carrying, lifting, house/yardwork, driving/computer work  [] (73295) Reviewed/Progressed HEP activities related to improving balance, coordination, kinesthetic sense, posture, motor skill, proprioception of scapular, scapulothoracic and UE control with self care, reaching, carrying, lifting, house/yardwork, driving/computer work      Manual Treatments:  PROM / STM / Oscillations-Mobs:  G-I, II, III, IV (PA's, Inf., Post.)  [x] (22031) Provided manual therapy to mobilize soft tissue/joints of cervical/CT, scapular GHJ and UE for the purpose of modulating pain, promoting relaxation,  increasing ROM, reducing/eliminating soft tissue swelling/inflammation/restriction, improving soft tissue extensibility and allowing for proper ROM for normal function with self care, reaching, carrying, lifting, house/yardwork, driving/computer work    ADL Training:  [] (12207) Provided self-care/home management training related to activities of daily living and compensatory training, and/or use of adaptive equipment      Charges:  Timed Code Treatment Minutes: 40   Total Treatment Minutes: 40   Worker's Comp: Time In/Time Out     [] EVAL (LOW) 45893 (typically 20 minutes face-to-face)    [] EVAL (MOD) 39737 (typically 30 minutes face-to-face)  [] EVAL (HIGH) 10895 (typically 45 minutes face-to-face)  [] OT Re-eval (57742)       [x] Cindi [phone]) x   1   [] ULDQY(69553)  [x] NMR (35258) x  1   [] Estim (attended) (50807)   [x] Manual ([phone]) x 1     [] US (08236)  [] TA (17692) x      [] Paraffin (03316)  [] ADL  ([phone]) x     [] Splint/L code:    [] Estim (unattended) [phone])  [] Fluidotherapy (33630)  [] DN 1-2 (24036)   [] DN 3+ ([phone])  [] Orthotic Mgmt, Subsequent Enc (33796)  [] Orthotic Mgmt & Training (59664)  [] Other:    ASSESSMENT:  Poor tolerance today. GOALS:  Patient stated goal: Be able to use her R arm     []? Progressing: []? Met: []? Not Met: []? Adjusted     Therapist goals for Patient:   Short Term Goals: To be achieved in: 2 weeks  1. Independent in HEP and progression per patient tolerance, in order to prevent re-injury. []? Progressing: []? Met: []?  Not Met: []? Patient limited by pain    [] Patient limited by other medical complications  [] Other:                  PLAN: See eval  [] Continue per plan of care [] Alter current plan (see comments above)  [x] Plan of care initiated [] Hold pending MD visit [] Discharge    Electronically signed by:  Dionte Ray OTR/L [id number]      Note: If patient does not return for scheduled/ recommended follow up visits, this note will serve as a discharge from care along with most recent update on progress.

## 2020-11-30 ENCOUNTER — HOSPITAL ENCOUNTER (OUTPATIENT)
Dept: OCCUPATIONAL THERAPY | Age: 80
Setting detail: THERAPIES SERIES
Discharge: HOME | End: 2020-11-30
Payer: MEDICARE

## 2020-11-30 PROCEDURE — 97110 THERAPEUTIC EXERCISES: CPT | Performed by: OCCUPATIONAL THERAPIST

## 2020-11-30 PROCEDURE — 97140 MANUAL THERAPY 1/> REGIONS: CPT | Performed by: OCCUPATIONAL THERAPIST

## 2020-11-30 PROCEDURE — 97112 NEUROMUSCULAR REEDUCATION: CPT | Performed by: OCCUPATIONAL THERAPIST

## 2020-11-30 NOTE — FLOWSHEET NOTE
[address] [facility], [address] [place] [address]  Phone: [phone]  Fax [phone]    Occupational Therapy Treatment Note/ Progress Report:     Is this a Progress Report:     []  Yes  [x]  No      If Yes:  Date Range for reporting period:  Beginning 10/23/20  Ending 2020  Progress report will be due (10 Rx or 30 days whichever is less):     Recertification will be due (POC Duration  / 90 days whichever is less): 20   Date:  2020  Patient Name:  Alfie Zeng    :  1940  MRN: [id number]  Medical/Treatment Diagnosis Information:  · Diagnosis: R distal humerus ORIF R elbow stiffness M25.621   ·       Insurance/Certification information:   [facility]  Physician Information:  Referring Practitioner: Dr. Zhane Castillo  Has the plan of care been signed (Y/N):        []  Yes  [x]  No   Comorbidities Affecting Functional Performance:     []Anxiety (F41.9)/Depression (F32.9)   []Diabetes Type 1(E10.65) or 2 (E11.65)   []Rheumatoid Arthritis (M05.9)  []Fibromyalgia (M79.7)  []Neuropathy(G60.9)  [x]Osteoarthritis(M19.91)  []None   [x]Other: A fib     Visit # Insurance Allowable Auth Required   5  []  Yes []  No    From 10/23/20  to 2020     Date of Injury: 10/14/20  Date of Surgery: 10/20/20     Date of Patient follow up with Physician:      RESTRICTIONS/PRECAUTIONS: gentle ROM only, no lifting, weight bearing, or overhead act.       Latex Allergy:  [x]? No      []? Yes                    Pacemaker:  [x]? No       []? Yes      Preferred Language for Healthcare:   [x]? English       []? other:      Functional Scale:  77% (Quick DASH)                                Date assessed:  10/23/2020     C-SSRS Triggered by Intake questionnaire (Past 2 wk assessment):    No, Questionnaire did not trigger screening.      SUBJECTIVE: I see the doctor Friday for repeat x rays        OBJECTIVE:   Date:  Hand Dominance: [x]?  Right    []? Left 10/23/2020    11/20/20  11/30/20    Objective Measures:       PAIN 5/10 4/10  3/10   Quick DASH 77%                                     Digits tip to DPFC in cm WFL      Thumb ROM MP  IP     Thumb opposition  R:  L:     Thumb Radial/Palmar abd ROM R:  L:     Wrist ROM Ext/Flex R: 50/59  L: WNL    Rad/Uln dev ROM R:  L:     Forearm ROM  Sup/pron R: NT  L: WNL    Elbow ROM Ext/flex R: 50/72  L: WNL 30/90 23/97   Shoulder Flex  Shoulder Abd  Shoulder IR/ER AAROM shoulder flexion 50 deg  NT. Edema in cm circumf. MCPJs R: 19.0  L: 18.0      Edema in cm circumf. Wrist R:  L:      strength in lbs R:  L:     Pinch Strengthin lbs: lat  R:  L:     Pinch Strength in lbs:  3 point R:  L:        MMT:        Observations:  (including splints, bandages, incisions, scars): Pt painful and nervous to move. All measurements taken with brace on.                   MODALITIES: 10/23/20  10/30/20  11/6/20  11/20/20  11/30/20    Fluidotherapy (45178)        Estim (37445/27081)        Paraffin (97252)        US (05986)        Iontophoresis (90865)        Hot Pack        Cold Pack  10'              INTERVENTIONS:        Therapeutic Exercise (51730) Issued AROM, shoulder, elbow, wrist FA, and hand . Pt.  Attempted demo of each with difficulty and pain  AAROM shoulder x 5 (difficult)    AROM elbow, forearm wrist and hand x 10 each direction gentle and with cues  AAROM shoulder x 5    AAROM by therapist x 5    Unable to initiate shoulder flexion on her own      AROM elbow, forearm and hand x 15 each  Scrunching towell with fingers    Wrist cisor 5'    Yellow digiflex  X 20  AROM elbow, FA, wrist     5'    X 20    Shoulder shrugs  X 5  X 10  X 10  X 10  X 10   SS  X 10  X 10  X 10 X 10         Flex bar red wrist flex/ext x 10 (elbow tucked)        No money x 10 x 2    Therapeutic Activity (04475)                        Manual Therapy (10992)   Retrograde mass to hand and forearm 8' 8' 8'   (IASTM, Dry Needling, manual mobilization)                Neuromuscular Reeducation (55190) Education   Cues for exercises. Education and cues  Finisher table no resistance ladder climb and circles x 10 each   Finisher no resistance ladder climb and circles x 15 x 2                    ADL Training (47742)   Practiced getting brace on and off. Min A                      HEP Training/Review See sheet(s)        See media file                Splinting        Lcode:        Orthotic Mgmt, Subsequent Enc (85868)        Orthotic Mgmt & Training (75057)                Other:  Redness and warmth noted in volar elbow, incsion is not red. Contacted MD and he moved her appointment from next Monday to this Monday. Less redness today. Therapeutic Exercise & NMR:  [x] (44240) Provided verbal/tactile cueing for activities related to strengthening, flexibility, endurance, ROM  for improvements in scapular, scapulothoracic and UE control with self care, reaching, carrying, lifting, house/yardwork, driving/computer work. [x] (50818) Provided verbal/tactile cueing for activities related to improving balance, coordination, kinesthetic sense, posture, motor skill, proprioception  to assist with  scapular, scapulothoracic and UE control with self care, reaching, carrying, lifting, house/yardwork, driving/computer work.     Therapeutic Activities & NMR:    [] (87515 or 44665) Provided verbal/tactile cueing for activities related to improving balance, coordination, kinesthetic sense, posture, motor skill, proprioception and motor activation to allow for proper function of scapular, scapulothoracic and UE control with self care, carrying, lifting, driving/computer work    Home Exercise Program:    [x] (71559) Reviewed/Progressed HEP activities related to strengthening, flexibility, endurance, ROM of scapular, scapulothoracic and UE control with self care, reaching, carrying, lifting, house/yardwork, driving/computer work  [] (26508) Reviewed/Progressed HEP activities related to improving balance, coordination, kinesthetic sense, posture, motor skill, proprioception of scapular, scapulothoracic and UE control with self care, reaching, carrying, lifting, house/yardwork, driving/computer work      Manual Treatments:  PROM / STM / Oscillations-Mobs:  G-I, II, III, IV (PA's, Inf., Post.)  [x] (32479) Provided manual therapy to mobilize soft tissue/joints of cervical/CT, scapular GHJ and UE for the purpose of modulating pain, promoting relaxation,  increasing ROM, reducing/eliminating soft tissue swelling/inflammation/restriction, improving soft tissue extensibility and allowing for proper ROM for normal function with self care, reaching, carrying, lifting, house/yardwork, driving/computer work    ADL Training:  [] (13633) Provided self-care/home management training related to activities of daily living and compensatory training, and/or use of adaptive equipment      Charges:  Timed Code Treatment Minutes: 40   Total Treatment Minutes: 40   Worker's Comp: Time In/Time Out     [] EVAL (LOW) 56702 (typically 20 minutes face-to-face)    [] EVAL (MOD) 04376 (typically 30 minutes face-to-face)  [] EVAL (HIGH) 89246 (typically 45 minutes face-to-face)  [] OT Re-eval (82292)       [x] Cindi [phone]) x   1   [] OCKEP(01547)  [x] NMR (80615) x  1   [] Estim (attended) (61215)   [x] Manual ([id number]) x 1     [] US (42783)  [] TA (26549) x      [] Paraffin (76341)  [] ADL  ([phone] 60) x     [] Splint/L code:    [] Estim (unattended) (22 820385)  [] Fluidotherapy (27786)  [] DN 1-2 (75910)   [] DN 3+ (12483)  [] Orthotic Mgmt, Subsequent Enc (47188)  [] Orthotic Mgmt & Training (13974)  [] Other:    ASSESSMENT:  Poor tolerance today. GOALS:  Patient stated goal: Be able to use her R arm     []? Progressing: []? Met: []? Not Met: []? Adjusted     Therapist goals for Patient:   Short Term Goals: To be achieved in: 2 weeks  1.  Independent in HEP and progression per patient tolerance, in order to prevent re-injury. []? Progressing: [x]? Met: []? Not Met: []? Adjusted   2. Patient will have a decrease in pain to facilitate improvement in movement, function, and ADLs as indicated by Functional Deficits. []? Progressing: [x]? Met: []? Not Met: []? Adjusted     Long Term Goals to be achieved in 12 weeks (through 12/23/20), including patient directed goals to address patient identified performance deficits:  1) Pt to be independent in graded HEP progression with a good level of effort and compliance. []? Progressing: []? Met: [x]? Not Met: []? Adjusted   2) Pt to report a score of </= 25% on the Quick DASH disability questionnaire for increased performance with carrying, moving, and handling objects. []? Progressing: []? Met: [x]? Not Met: []? Adjusted   3) Pt will demonstrate increased ROM to R elbow to 20/110 for improved independence with reaching her face and hair for grooming . []? Progressing: []? Met: [x]? Not Met: []? Adjusted   4) Pt will demonstrate increased strength to R  to 20# for improved independence with cooking. []? Progressing: []? Met: [x]? Not Met: []? Adjusted   5) Pt will have a decrease in pain to 0-3/10 to facilitate playing with grand kids . []? Progressing: [x]? Met: []? Not Met: []? Adjusted                 Overall Progression Towards Functional Goals/Treatment Progress Update:  [] Patient is progressing as expected towards functional goals listed. [x] Progression is slowed due to complexities/impairments listed. [] Progression has been slowed due to co-morbidities.   [] Plan just implemented, too soon to assess goals progression <30 days  [] Goals require adjustment due to lack of progress  [] Patient is not progressing as expected and requires additional follow up with physician  [] All goals are met  [] Other:     Prognosis for POC: [x] Good [] Fair  [] Poor    Patient requires continued skilled intervention: [x] Yes  [] No    Treatment/Activity Tolerance:  [x] Patient able to complete treatment  [] Patient limited by fatigue  [] Patient limited by pain    [] Patient limited by other medical complications  [] Other:                  PLAN: See eval  [x] Continue per plan of care [] Alter current plan (see comments above)  [] Plan of care initiated [] Hold pending MD visit [] Discharge    Electronically signed by:  Lilly ROSARIO/LE [id number]      Note: If patient does not return for scheduled/ recommended follow up visits, this note will serve as a discharge from care along with most recent update on progress.

## 2020-12-03 ENCOUNTER — ANTI-COAG VISIT (OUTPATIENT)
Dept: PHARMACY | Age: 80
End: 2020-12-03
Payer: MEDICARE

## 2020-12-03 LAB — INR BLD: 3.3

## 2020-12-03 PROCEDURE — 85610 PROTHROMBIN TIME: CPT | Performed by: FAMILY MEDICINE

## 2020-12-03 PROCEDURE — 99211 OFF/OP EST MAY X REQ PHY/QHP: CPT | Performed by: FAMILY MEDICINE

## 2020-12-04 ENCOUNTER — APPOINTMENT (OUTPATIENT)
Dept: OCCUPATIONAL THERAPY | Age: 80
End: 2020-12-04
Payer: MEDICARE

## 2020-12-04 ENCOUNTER — OFFICE VISIT (OUTPATIENT)
Dept: ORTHOPEDIC SURGERY | Age: 80
End: 2020-12-04

## 2020-12-04 VITALS — WEIGHT: 188 LBS | BODY MASS INDEX: 34.6 KG/M2 | HEIGHT: 62 IN

## 2020-12-04 PROCEDURE — 99024 POSTOP FOLLOW-UP VISIT: CPT | Performed by: ORTHOPAEDIC SURGERY

## 2020-12-04 NOTE — PROGRESS NOTES
Subjective: Patient is here for 6-week post-op after overdose and fixation of right distal humerus fracture. In the interim she saw a partner Dr. Shahida Hubbard because of some noncompliance that she had a little bit of loss of reduction but he felt she would do well. She is in fact doing great today. Her motion is improving although she still has a bit of issue with getting her full flexion back. She is having very little pain and her swelling is going down. She has no concerns at this time. Objective: Physical exam shows her right upper extremity is significantly swollen than my previous visit and she is neurovascular intact. Her range of motion is about 5 degrees short of full extension to about 100 degrees of flexion. Incision is well-healed evidence of infection    Imagin views of the humerus are obtained which show abundant callus formation and no loosening of hardware complication about this distal humerus. Assessment and plan: 76-year-old female who is 6-week status post a reduction fixation of her right distal humerus fracture. She is doing very well. This point I advance her to 5 pounds of lifting but would still like her to keep from full weightbearing on this. In 8 weeks postoperatively she may weight-bear as tolerated gradually advancing to that. She should continue to wear hinged elbow brace with lifting or any activities until about 8 weeks out as well. I like to see her back at 12 weeks postoperatively for repeat follow-up and further evaluation.

## 2020-12-07 ENCOUNTER — HOSPITAL ENCOUNTER (OUTPATIENT)
Dept: OCCUPATIONAL THERAPY | Age: 80
Setting detail: THERAPIES SERIES
Discharge: HOME | End: 2020-12-07
Payer: MEDICARE

## 2020-12-07 PROCEDURE — 97112 NEUROMUSCULAR REEDUCATION: CPT | Performed by: OCCUPATIONAL THERAPIST

## 2020-12-07 PROCEDURE — 97110 THERAPEUTIC EXERCISES: CPT | Performed by: OCCUPATIONAL THERAPIST

## 2020-12-07 NOTE — FLOWSHEET NOTE
[address] [facility], [address] [place] [address] Sreekanth Goodwin  Phone: [phone]  Fax [phone]    Occupational Therapy Treatment Note/ Progress Report:     Is this a Progress Report:     []  Yes  [x]  No      If Yes:  Date Range for reporting period:  Beginning 10/23/20  Ending 2020  Progress report will be due (10 Rx or 30 days whichever is less):     Recertification will be due (POC Duration  / 90 days whichever is less): 20   Date:  2020  Patient Name:  Derrick Abreu    :  1940  MRN: [id number]  Medical/Treatment Diagnosis Information:  · Diagnosis: R distal humerus ORIF R elbow stiffness M25.621   ·       Insurance/Certification information:   [facility]  Physician Information:  Referring Practitioner: Dr. Jose Antonio Mccloud  Has the plan of care been signed (Y/N):        []  Yes  [x]  No   Comorbidities Affecting Functional Performance:     []Anxiety (F41.9)/Depression (F32.9)   []Diabetes Type 1(E10.65) or 2 (E11.65)   []Rheumatoid Arthritis (M05.9)  []Fibromyalgia (M79.7)  []Neuropathy(G60.9)  [x]Osteoarthritis(M19.91)  []None   [x]Other: A fib     Visit # Insurance Allowable Auth Required   6  []  Yes []  No    From 10/23/20  to 2020     Date of Injury: 10/14/20  Date of Surgery: 10/20/20     Date of Patient follow up with Physician:      RESTRICTIONS/PRECAUTIONS: 5# weight limit, non weight bearing       Latex Allergy:  [x]? No      []? Yes                    Pacemaker:  [x]? No       []? Yes      Preferred Language for Healthcare:   [x]? English       []? other:      Functional Scale:  77% (Quick DASH)                                Date assessed:  10/23/2020     C-SSRS Triggered by Intake questionnaire (Past 2 wk assessment):    No, Questionnaire did not trigger screening.      SUBJECTIVE: MD reports good fracture healing     PAIN: 3/10        OBJECTIVE:   Date:  Hand Dominance:     [x]? Right    []? Left 10/23/2020    11/20/20  11/30/20  12/7/20    Objective Measures:        PAIN 5/10 4/10  3/10 3/10   Quick DASH 77%                                          Digits tip to DPFC in cm WFL       Thumb ROM MP  IP      Thumb opposition  R:  L:      Thumb Radial/Palmar abd ROM R:  L:      Wrist ROM Ext/Flex R: 50/59  L: WNL     Rad/Uln dev ROM R:  L:      Forearm ROM  Sup/pron R: NT  L: WNL     Elbow ROM Ext/flex R: 50/72  L: WNL 30/90 23/97    Shoulder Flex  Shoulder Abd  Shoulder IR/ER AAROM shoulder flexion 50 deg  NT. Edema in cm circumf. MCPJs R: 19.0  L: 18.0       Edema in cm circumf. Wrist R:  L:       strength in lbs R:  L:   R: 21.6   L: 36.1   Pinch Strengthin lbs: lat  R:  L:      Pinch Strength in lbs:  3 point R:  L:         MMT:         Observations:  (including splints, bandages, incisions, scars): Pt painful and nervous to move. All measurements taken with brace on.                    MODALITIES: 10/23/20  10/30/20  11/6/20  11/20/20  11/30/20  12/7/20    Fluidotherapy (32575)         Estim (01516/18522)         Paraffin (25700)         US (50953)         Iontophoresis (15828)         Hot Pack         Cold Pack  10'                INTERVENTIONS:         Therapeutic Exercise (23822) Issued AROM, shoulder, elbow, wrist FA, and hand . Pt.  Attempted demo of each with difficulty and pain  AAROM shoulder x 5 (difficult)    AROM elbow, forearm wrist and hand x 10 each direction gentle and with cues  AAROM shoulder x 5    AAROM by therapist x 5    Unable to initiate shoulder flexion on her own      AROM elbow, forearm and hand x 15 each  Scrunching towell with fingers    Wrist cisor 5'    Yellow digiflex  X 20  AROM elbow, FA, wrist     5'    X 20  AROM elbow and FA        X 20    Shoulder shrugs  X 5  X 10  X 10  X 10  X 10 X 10    SS  X 10  X 10  X 10 X 10  X 10         Flex bar red wrist flex/ext x 10 (elbow tucked) X 20 all motions    S/p with black mallet x 10 x 2         No money x 10 x 2  10 x 2     AAROM shoulder standing at finisher x 10     Up the wall x 5 (difficult)   Therapeutic Activity (10485)      AAROM shoulder flexion sitting in rolling chair at table x 10                      Manual Therapy (51981)   Retrograde mass to hand and forearm 8' 8' 8'    (IASTM, Dry Needling, manual mobilization)                  Neuromuscular Reeducation (34671) Education   Cues for exercises. Education and cues  Finisher table no resistance ladder climb and circles x 10 each   Finisher no resistance ladder climb and circles x 15 x 2  15 x 2       Cues for exercises                      ADL Training (61468)   Practiced getting brace on and off. Min A                         HEP Training/Review See sheet(s)         See media file                  Splinting         Lcode:         Orthotic Mgmt, Subsequent Enc (49581)         Orthotic Mgmt & Training (92238)                  Other:  Redness and warmth noted in volar elbow, incsion is not red. Contacted MD and he moved her appointment from next Monday to this Monday. Less redness today. Therapeutic Exercise & NMR:  [x] (67897) Provided verbal/tactile cueing for activities related to strengthening, flexibility, endurance, ROM  for improvements in scapular, scapulothoracic and UE control with self care, reaching, carrying, lifting, house/yardwork, driving/computer work. [x] (09615) Provided verbal/tactile cueing for activities related to improving balance, coordination, kinesthetic sense, posture, motor skill, proprioception  to assist with  scapular, scapulothoracic and UE control with self care, reaching, carrying, lifting, house/yardwork, driving/computer work.     Therapeutic Activities & NMR:    [] (48134 or 88184) Provided verbal/tactile cueing for activities related to improving balance, coordination, kinesthetic sense, posture, motor skill, proprioception and motor activation to allow for proper function of scapular, scapulothoracic and UE control progression <30 days  [] Goals require adjustment due to lack of progress  [] Patient is not progressing as expected and requires additional follow up with physician  [] All goals are met  [] Other:     Prognosis for POC: [x] Good [] Fair  [] Poor    Patient requires continued skilled intervention: [x] Yes  [] No    Treatment/Activity Tolerance:  [x] Patient able to complete treatment  [] Patient limited by fatigue  [] Patient limited by pain    [] Patient limited by other medical complications  [] Other:                  PLAN: See eval  [x] Continue per plan of care [] Alter current plan (see comments above)  [] Plan of care initiated [] Hold pending MD visit [] Discharge    Electronically signed by:  Zeina Gabriel OTR/LE [id number]      Note: If patient does not return for scheduled/ recommended follow up visits, this note will serve as a discharge from care along with most recent update on progress.

## 2020-12-14 ENCOUNTER — HOSPITAL ENCOUNTER (OUTPATIENT)
Dept: OCCUPATIONAL THERAPY | Age: 80
Setting detail: THERAPIES SERIES
Discharge: HOME | End: 2020-12-14
Payer: MEDICARE

## 2020-12-14 PROCEDURE — 97110 THERAPEUTIC EXERCISES: CPT | Performed by: OCCUPATIONAL THERAPIST

## 2020-12-14 PROCEDURE — 97112 NEUROMUSCULAR REEDUCATION: CPT | Performed by: OCCUPATIONAL THERAPIST

## 2020-12-14 NOTE — FLOWSHEET NOTE
[address] [facility], [address] [place] [address]  Phone: [phone]  Fax [phone]    Occupational Therapy Treatment Note/ Progress Report:     Is this a Progress Report:     []  Yes  [x]  No      If Yes:  Date Range for reporting period:  Beginning 10/23/20  Ending 2020  Progress report will be due (10 Rx or 30 days whichever is less):     Recertification will be due (POC Duration  / 90 days whichever is less): 20   Date:  2020  Patient Name:  Eric Tyler    :  1940  MRN: [id number]  Medical/Treatment Diagnosis Information:  · Diagnosis: R distal humerus ORIF R elbow stiffness M25.621   ·       Insurance/Certification information:   [facility]  Physician Information:  Referring Practitioner: Dr. Angel Lyn  Has the plan of care been signed (Y/N):        []  Yes  [x]  No   Comorbidities Affecting Functional Performance:     []Anxiety (F41.9)/Depression (F32.9)   []Diabetes Type 1(E10.65) or 2 (E11.65)   []Rheumatoid Arthritis (M05.9)  []Fibromyalgia (M79.7)  []Neuropathy(G60.9)  [x]Osteoarthritis(M19.91)  []None   [x]Other: A fib     Visit # Insurance Allowable Auth Required   7  []  Yes []  No    From 10/23/20  to 2020     Date of Injury: 10/14/20  Date of Surgery: 10/20/20     Date of Patient follow up with Physician:      RESTRICTIONS/PRECAUTIONS: 5# weight limit, non weight bearing       Latex Allergy:  [x]? No      []? Yes                    Pacemaker:  [x]? No       []? Yes      Preferred Language for Healthcare:   [x]? English       []? other:      Functional Scale:  77% (Quick DASH)                                Date assessed:  10/23/2020     C-SSRS Triggered by Intake questionnaire (Past 2 wk assessment):    No, Questionnaire did not trigger screening.      SUBJECTIVE:  I think I may be able to raise my arm up easier     PAIN: 4/10  With use       OBJECTIVE:   Date:  Hand X 10 X 10  X 10  X 10         Flex bar red wrist flex/ext x 10 (elbow tucked) X 20 all motions    S/p with black mallet x 10 x 2  X 20 each      X 10 x 2         No money x 10 x 2  10 x 2     AAROM shoulder standing at finisher x 10     Up the wall x 5 (difficult)       X 10         X 10      Therapeutic Activity (75076)      AAROM shoulder flexion sitting in rolling chair at table x 10  X 10           Rolling putty 3'             Manual Therapy (19719)   Retrograde mass to hand and forearm 8' 8' 8'     (IASTM, Dry Needling, manual mobilization)                    Neuromuscular Reeducation (32985) Education   Cues for exercises. Education and cues  Finisher table no resistance ladder climb and circles x 10 each   Finisher no resistance ladder climb and circles x 15 x 2  15 x 2       Cues for exercises  15 x 2                        ADL Training (13953)   Practiced getting brace on and off. Min A                            HEP Training/Review See sheet(s)          See media file                    Splinting          Lcode:          Orthotic Mgmt, Subsequent Enc (37316)          Orthotic Mgmt & Training (35041)                    Other:  Redness and warmth noted in volar elbow, incsion is not red. Contacted MD and he moved her appointment from next Monday to this Monday. Less redness today. Therapeutic Exercise & NMR:  [x] (54658) Provided verbal/tactile cueing for activities related to strengthening, flexibility, endurance, ROM  for improvements in scapular, scapulothoracic and UE control with self care, reaching, carrying, lifting, house/yardwork, driving/computer work. [x] (18655) Provided verbal/tactile cueing for activities related to improving balance, coordination, kinesthetic sense, posture, motor skill, proprioception  to assist with  scapular, scapulothoracic and UE control with self care, reaching, carrying, lifting, house/yardwork, driving/computer work.     Therapeutic Activities & NMR:    [] (62346 or ) Provided verbal/tactile cueing for activities related to improving balance, coordination, kinesthetic sense, posture, motor skill, proprioception and motor activation to allow for proper function of scapular, scapulothoracic and UE control with self care, carrying, lifting, driving/computer work    Home Exercise Program:    [x] (44396) Reviewed/Progressed HEP activities related to strengthening, flexibility, endurance, ROM of scapular, scapulothoracic and UE control with self care, reaching, carrying, lifting, house/yardwork, driving/computer work  [] (88351) Reviewed/Progressed HEP activities related to improving balance, coordination, kinesthetic sense, posture, motor skill, proprioception of scapular, scapulothoracic and UE control with self care, reaching, carrying, lifting, house/yardwork, driving/computer work      Manual Treatments:  PROM / STM / Oscillations-Mobs:  G-I, II, III, IV (PA's, Inf., Post.)  [] (48752) Provided manual therapy to mobilize soft tissue/joints of cervical/CT, scapular GHJ and UE for the purpose of modulating pain, promoting relaxation,  increasing ROM, reducing/eliminating soft tissue swelling/inflammation/restriction, improving soft tissue extensibility and allowing for proper ROM for normal function with self care, reaching, carrying, lifting, house/yardwork, driving/computer work    ADL Training:  [] (50219) Provided self-care/home management training related to activities of daily living and compensatory training, and/or use of adaptive equipment      Charges:  Timed Code Treatment Minutes: 38   Total Treatment Minutes: 38   Worker's Comp: Time In/Time Out     [] EVAL (LOW) 35979 (typically 20 minutes face-to-face)    [] EVAL (MOD) 64412 (typically 30 minutes face-to-face)  [] EVAL (HIGH) 46667 (typically 45 minutes face-to-face)  [] OT Re-eval (36794)       [x] Cindi (35567) x   2   [] RZANM(56478)  [x] NMR (54904) x  1   [] Estim (attended) (36893)   [] Manual ([id number]) x      [] US (00114)  [] TA (08697) x      [] Paraffin (09210)  [] ADL  ([phone]) x     [] Splint/L code:    [] Estim (unattended) (89052)  [] Fluidotherapy (42865)  [] DN 1-2 (57828)   [] DN 3+ (70887)  [] Orthotic Mgmt, Subsequent Enc (01509)  [] Orthotic Mgmt & Training (36507)  [] Other:    ASSESSMENT:  Better tolerance for act. Good progress with elbow ROM     GOALS:  Patient stated goal: Be able to use her R arm     []? Progressing: []? Met: []? Not Met: []? Adjusted     Therapist goals for Patient:   Short Term Goals: To be achieved in: 2 weeks  1. Independent in HEP and progression per patient tolerance, in order to prevent re-injury. []? Progressing: [x]? Met: []? Not Met: []? Adjusted   2. Patient will have a decrease in pain to facilitate improvement in movement, function, and ADLs as indicated by Functional Deficits. []? Progressing: [x]? Met: []? Not Met: []? Adjusted     Long Term Goals to be achieved in 12 weeks (through 12/23/20), including patient directed goals to address patient identified performance deficits:  1) Pt to be independent in graded HEP progression with a good level of effort and compliance. []? Progressing: []? Met: [x]? Not Met: []? Adjusted   2) Pt to report a score of </= 25% on the Quick DASH disability questionnaire for increased performance with carrying, moving, and handling objects. []? Progressing: []? Met: [x]? Not Met: []? Adjusted   3) Pt will demonstrate increased ROM to R elbow to 20/110 for improved independence with reaching her face and hair for grooming . []? Progressing: []? Met: [x]? Not Met: []? Adjusted   4) Pt will demonstrate increased strength to R  to 20# for improved independence with cooking. []? Progressing: []? Met: [x]? Not Met: []? Adjusted   5) Pt will have a decrease in pain to 0-3/10 to facilitate playing with grand kids . []? Progressing: [x]? Met: []? Not Met: []?  Adjusted                 Overall Progression Towards

## 2020-12-16 ENCOUNTER — TELEPHONE (OUTPATIENT)
Dept: PHARMACY | Facility: CLINIC | Age: 80
End: 2020-12-16

## 2020-12-16 NOTE — LETTER
Patient with Recent Fracture    Date: 20    Dear Effie Zamora MD,    Fax: [phone]     Concerning patient: Alexandra Ibarra   :  1940    Considerations: (recent fracture of humerus, 10/14/20 ED visit)  - Suggest obtaining DEXA     Please follow up with the patient for any changes as appropriate. Please also feel free to contact me at the number below with any questions or concerns, or if you would like me to further discuss with your patient. Thank you for your help and consideration in caring for this patient. Respectfully,  James Vera, [place]  [address]  Phone: [phone], option 7                      Fax: [phone]      =======================================================   CLINICAL PHARMACY CONSULT: RECENT FRACTURE  The [address] Team has an initiative to help manage osteoporosis in women with recent fractures. This initiative was started as another strategy to help meet this quality measure for our insurers by reducing the risk of osteoporosis-related fractures. No results found for: VITD25   Lab Results   Component Value Date    CALCIUM 8.2 (L) 10/21/2020     CrCl cannot be calculated (This lab value cannot be used to calculate CrCl because it is not a number: <0.5).     DEXA: None noted    FRAX-calculated 10-year fracture probability:   Per WHO calculator: major Osteoporotic = 22% and Hip = 6.5%    Assessment:   - AACE recommends BMD testing in adults who have a fracture at or after age 48; USPSTF and ACP recommend DEXA for women at or after age 72  · 67 y.o. female with recent fracture and would benefit from DEXA to assess current BMD

## 2020-12-16 NOTE — LETTER
[address]  [address], [address]        Gertrudis Sung   [place] [phone]           12/16/20     Dear Gertrudis Sung,    We see you had a recent fracture. Your bone health is very important - did you know there are ways to measure your risk for future fractures? One way is a bone mineral density test (DEXA scan). A DEXA scan is a type of x-ray, done as an outpatient radiological test.  It is not painful and it takes a short time to perform. We encourage you to call your provider to ask about scheduling this test and further discussing your bone health. Enclosed are some educational materials about DEXA scans and calcium. If you have additional questions, please call us or your provider.     [facility] ([facility])   Phone: [phone], option 7

## 2020-12-16 NOTE — TELEPHONE ENCOUNTER
See letters tab.    =======================================================   For Pharmacy Admin Tracking Only    PHSO: Yes  Total # of Interventions Recommended: 1  - Maintenance Safety Lab Monitoring #: 1  Recommended intervention potential cost savings: 0  Total Interventions Accepted: 0  Time Spent (min): 10
targeting vitamin D  ng/ml: Ergocalciferol 94345 units x12 weeks is appropriate  - Estimated renal clearance: ~94ml/min    Considerations:  - Suggest obtaining DEXA   - Suggest calcium 1200 mg/day in divided doses via diet & supplementation     Will fax the above recommendations to PCP, Dr. Isiah Mary and send patient letter.     Kady Casillas, PharmD  PGY-2 Ambulatory Care Pharmacy Resident    [phone] opt 7

## 2020-12-17 ENCOUNTER — ANTI-COAG VISIT (OUTPATIENT)
Dept: PHARMACY | Age: 80
End: 2020-12-17
Payer: MEDICARE

## 2020-12-17 LAB — INR BLD: 3.1

## 2020-12-17 PROCEDURE — 85610 PROTHROMBIN TIME: CPT

## 2020-12-17 PROCEDURE — 99211 OFF/OP EST MAY X REQ PHY/QHP: CPT

## 2020-12-21 ENCOUNTER — HOSPITAL ENCOUNTER (OUTPATIENT)
Dept: OCCUPATIONAL THERAPY | Age: 80
Setting detail: THERAPIES SERIES
Discharge: HOME | End: 2020-12-21
Payer: MEDICARE

## 2020-12-21 PROCEDURE — 97110 THERAPEUTIC EXERCISES: CPT | Performed by: OCCUPATIONAL THERAPIST

## 2020-12-21 PROCEDURE — 97112 NEUROMUSCULAR REEDUCATION: CPT | Performed by: OCCUPATIONAL THERAPIST

## 2020-12-21 NOTE — FLOWSHEET NOTE
[address] [facility], [address] [place] [address] Sreekanth Goodwin  Phone: [phone]  Fax [phone]    Occupational Therapy Treatment Note/ Progress Report:     Is this a Progress Report:     []  Yes  [x]  No      If Yes:  Date Range for reporting period:  Beginning 10/23/20  Ending 2020  Progress report will be due (10 Rx or 30 days whichever is less):     Recertification will be due (POC Duration  / 90 days whichever is less): 20   Date:  2020  Patient Name:  Cristiana Lyman    :  1940  MRN: [id number]  Medical/Treatment Diagnosis Information:  · Diagnosis: R distal humerus ORIF R elbow stiffness M25.621   ·       Insurance/Certification information:   [facility]  Physician Information:  Referring Practitioner: Dr. Locke Doing  Has the plan of care been signed (Y/N):        []  Yes  [x]  No   Comorbidities Affecting Functional Performance:     []Anxiety (F41.9)/Depression (F32.9)   []Diabetes Type 1(E10.65) or 2 (E11.65)   []Rheumatoid Arthritis (M05.9)  []Fibromyalgia (M79.7)  []Neuropathy(G60.9)  [x]Osteoarthritis(M19.91)  []None   [x]Other: A fib     Visit # Insurance Allowable Auth Required   8  []  Yes []  No    From 10/23/20  to 2020     Date of Injury: 10/14/20  Date of Surgery: 10/20/20     Date of Patient follow up with Physician:      RESTRICTIONS/PRECAUTIONS: 5# weight limit, non weight bearing       Latex Allergy:  [x]? No      []? Yes                    Pacemaker:  [x]? No       []? Yes      Preferred Language for Healthcare:   [x]? English       []? other:      Functional Scale:  77% (Quick DASH)                                Date assessed:  10/23/2020     C-SSRS Triggered by Intake questionnaire (Past 2 wk assessment):    No, Questionnaire did not trigger screening.      SUBJECTIVE:  Able to get to my hair and face a little bit easier      PAIN: 3/10  With use in shoulder        OBJECTIVE: Date:  Hand Dominance:     [x]? Right    []? Left 10/23/2020    11/20/20  11/30/20  12/7/20  12/14/20  12/21/20    Objective Measures:          PAIN 5/10 4/10  3/10 3/10  3/10    Quick DASH 77%                                                    Digits tip to DPFC in cm WFL         Thumb ROM MP  IP        Thumb opposition  R:  L:        Thumb Radial/Palmar abd ROM R:  L:        Wrist ROM Ext/Flex R: 50/59  L: WNL       Rad/Uln dev ROM R:  L:        Forearm ROM  Sup/pron R: NT  L: WNL       Elbow ROM Ext/flex R: 50/72  L: WNL 30/90 23/97 21/106    Shoulder Flex  Shoulder Abd  Shoulder IR/ER AAROM shoulder flexion 50 deg  NT. Shoulder flex 45 (unable to hold it there) 58 (unable to hold)    Able to go up the wall farther today with the towel    Edema in cm circumf. MCPJs R: 19.0  L: 18.0         Edema in cm circumf. Wrist R:  L:         strength in lbs R:  L:   R: 21.6   L: 36.1 R: 18# R: 24.9   Pinch Strengthin lbs: lat  R:  L:        Pinch Strength in lbs:  3 point R:  L:           MMT:           Observations:  (including splints, bandages, incisions, scars): Pt painful and nervous to move. All measurements taken with brace on.                      MODALITIES: 10/23/20  10/30/20  11/6/20  11/20/20  11/30/20  12/7/20  12/14/20  12/21/20    Fluidotherapy (25810)           Estim (41113/[id number])           Paraffin (57977)           US (30808)           Iontophoresis (02723)           Hot Pack           Cold Pack  10'                    INTERVENTIONS:           Therapeutic Exercise (04065) Issued AROM, shoulder, elbow, wrist FA, and hand . Pt.  Attempted demo of each with difficulty and pain  AAROM shoulder x 5 (difficult)    AROM elbow, forearm wrist and hand x 10 each direction gentle and with cues  AAROM shoulder x 5    AAROM by therapist x 5    Unable to initiate shoulder flexion on her own      AROM elbow, forearm and hand x 15 each  Scrunching towell with fingers    Wrist cisor 5' Yellow digiflex  X 20  AROM elbow, FA, wrist     5'    X 20  AROM elbow and FA        X 20  Same    AROM elbow and shoulder x 10    Shoulder shrugs  X 5  X 10  X 10  X 10  X 10 X 10  X 10  X 10    SS  X 10  X 10  X 10 X 10  X 10  X 10  X 10         Flex bar red wrist flex/ext x 10 (elbow tucked) X 20 all motions    S/p with black mallet x 10 x 2  X 20 each      X 10 x 2  X 20 each    10 x 2             No money x 10 x 2  10 x 2     AAROM shoulder standing at finisher x 10     Up the wall x 5 (difficult)       X 10         X 10          X 10 each        X 5   Therapeutic Activity (93951)      AAROM shoulder flexion sitting in rolling chair at table x 10  X 10            Rolling putty 3'               Manual Therapy (68226)   Retrograde mass to hand and forearm 8' 8' 8'      (IASTM, Dry Needling, manual mobilization)                      Neuromuscular Reeducation (19936) Education   Cues for exercises. Education and cues  Finisher table no resistance ladder climb and circles x 10 each   Finisher no resistance ladder climb and circles x 15 x 2  15 x 2       Cues for exercises  15 x 2  15 x 2       Cues and encouragement for shoulder flexion                         ADL Training (71741)   Practiced getting brace on and off. Min A                               HEP Training/Review See sheet(s)           See media file                      Splinting           Lcode:           Orthotic Mgmt, Subsequent Enc (83048)           Orthotic Mgmt & Training (75525)                      Other:  Redness and warmth noted in volar elbow, incsion is not red. Contacted MD and he moved her appointment from next Monday to this Monday. Less redness today.           Therapeutic Exercise & NMR: [x] (93315) Provided verbal/tactile cueing for activities related to strengthening, flexibility, endurance, ROM  for improvements in scapular, scapulothoracic and UE control with self care, reaching, carrying, lifting, house/yardwork, driving/computer work. [x] (16118) Provided verbal/tactile cueing for activities related to improving balance, coordination, kinesthetic sense, posture, motor skill, proprioception  to assist with  scapular, scapulothoracic and UE control with self care, reaching, carrying, lifting, house/yardwork, driving/computer work.     Therapeutic Activities & NMR:    [] (81668 or 61441) Provided verbal/tactile cueing for activities related to improving balance, coordination, kinesthetic sense, posture, motor skill, proprioception and motor activation to allow for proper function of scapular, scapulothoracic and UE control with self care, carrying, lifting, driving/computer work    Home Exercise Program:    [x] (47581) Reviewed/Progressed HEP activities related to strengthening, flexibility, endurance, ROM of scapular, scapulothoracic and UE control with self care, reaching, carrying, lifting, house/yardwork, driving/computer work  [] (84964) Reviewed/Progressed HEP activities related to improving balance, coordination, kinesthetic sense, posture, motor skill, proprioception of scapular, scapulothoracic and UE control with self care, reaching, carrying, lifting, house/yardwork, driving/computer work      Manual Treatments:  PROM / STM / Oscillations-Mobs:  G-I, II, III, IV (PA's, Inf., Post.) [] (84922) Provided manual therapy to mobilize soft tissue/joints of cervical/CT, scapular GHJ and UE for the purpose of modulating pain, promoting relaxation,  increasing ROM, reducing/eliminating soft tissue swelling/inflammation/restriction, improving soft tissue extensibility and allowing for proper ROM for normal function with self care, reaching, carrying, lifting, house/yardwork, driving/computer work    ADL Training:  [] (55836) Provided self-care/home management training related to activities of daily living and compensatory training, and/or use of adaptive equipment      Charges:  Timed Code Treatment Minutes: 38   Total Treatment Minutes: 38   Worker's Comp: Time In/Time Out     [] EVAL (LOW) 91253 (typically 20 minutes face-to-face)    [] EVAL (MOD) 04952 (typically 30 minutes face-to-face)  [] EVAL (HIGH) 76443 (typically 45 minutes face-to-face)  [] OT Re-eval (63214)       [x] Cindi [phone] x   2   [] VLHZA(45977)  [x] NMR (01732) x  1   [] Estim (attended) (12420)   [] Manual ([phone]) x      [] US (64030)  [] TA () x      [] Paraffin (93479)  [] ADL  ([phone]) x     [] Splint/L code:    [] Estim (unattended) 33 [phone] 31)  [] Fluidotherapy (58578)  [] DN 1-2 (50991)   [] DN 3+ ([phone])  [] Orthotic Mgmt, Subsequent Enc (82177)  [] Orthotic Mgmt & Training (00484)  [] Other:    ASSESSMENT:  Better tolerance for act. Good progress with elbow ROM     GOALS:  Patient stated goal: Be able to use her R arm     []? Progressing: []? Met: []? Not Met: []? Adjusted     Therapist goals for Patient:   Short Term Goals: To be achieved in: 2 weeks  1. Independent in HEP and progression per patient tolerance, in order to prevent re-injury. []? Progressing: [x]? Met: []? Not Met: []? Adjusted   2. Patient will have a decrease in pain to facilitate improvement in movement, function, and ADLs as indicated by Functional Deficits. []? Progressing: [x]? Met: []? Not Met: []?  Adjusted    Long Term Goals to be achieved in 12 weeks (through 12/23/20), including patient directed goals to address patient identified performance deficits:  1) Pt to be independent in graded HEP progression with a good level of effort and compliance. []? Progressing: []? Met: [x]? Not Met: []? Adjusted   2) Pt to report a score of </= 25% on the Quick DASH disability questionnaire for increased performance with carrying, moving, and handling objects. []? Progressing: []? Met: [x]? Not Met: []? Adjusted   3) Pt will demonstrate increased ROM to R elbow to 20/110 for improved independence with reaching her face and hair for grooming . [x]? Progressing: []? Met: []? Not Met: []? Adjusted   4) Pt will demonstrate increased strength to R  to 20# for improved independence with cooking. []? Progressing: []? Met: [x]? Not Met: []? Adjusted   5) Pt will have a decrease in pain to 0-3/10 to facilitate playing with grand kids . []? Progressing: [x]? Met: []? Not Met: []? Adjusted                 Overall Progression Towards Functional Goals/Treatment Progress Update:  [] Patient is progressing as expected towards functional goals listed. [x] Progression is slowed due to complexities/impairments listed. [] Progression has been slowed due to co-morbidities.   [] Plan just implemented, too soon to assess goals progression <30 days  [] Goals require adjustment due to lack of progress  [] Patient is not progressing as expected and requires additional follow up with physician  [] All goals are met  [] Other:     Prognosis for POC: [x] Good [] Fair  [] Poor    Patient requires continued skilled intervention: [x] Yes  [] No    Treatment/Activity Tolerance:  [x] Patient able to complete treatment  [] Patient limited by fatigue  [] Patient limited by pain    [] Patient limited by other medical complications  [] Other:                  PLAN: See eval  [x] Continue per plan of care [] Alter current plan (see comments above)

## 2020-12-28 ENCOUNTER — APPOINTMENT (OUTPATIENT)
Dept: OCCUPATIONAL THERAPY | Age: 80
End: 2020-12-28
Payer: MEDICARE

## 2021-01-04 ENCOUNTER — HOSPITAL ENCOUNTER (OUTPATIENT)
Dept: OCCUPATIONAL THERAPY | Age: 81
Setting detail: THERAPIES SERIES
Discharge: HOME | End: 2021-01-04
Payer: MEDICARE

## 2021-01-04 PROCEDURE — 97112 NEUROMUSCULAR REEDUCATION: CPT | Performed by: OCCUPATIONAL THERAPIST

## 2021-01-04 PROCEDURE — 97110 THERAPEUTIC EXERCISES: CPT | Performed by: OCCUPATIONAL THERAPIST

## 2021-01-04 NOTE — FLOWSHEET NOTE
[address] [facility], [address] [place] [address]  Phone: [phone]  Fax [phone]    Occupational Therapy Treatment Note/ Progress Report:     Is this a Progress Report:     []  Yes  [x]  No      If Yes:  Date Range for reporting period:  Beginning 10/23/20  Ending 2021  Progress report will be due (10 Rx or 30 days whichever is less):     Recertification will be due (POC Duration  / 90 days whichever is less): 20   Date:  2021  Patient Name:  Macho Weston    :  1940  MRN: [id number]  Medical/Treatment Diagnosis Information:  · Diagnosis: R distal humerus ORIF R elbow stiffness M25.621   ·       Insurance/Certification information:    [employer]  Physician Information:  Referring Practitioner: Dr. Robyn Sparrow  Has the plan of care been signed (Y/N):        []  Yes  [x]  No   Comorbidities Affecting Functional Performance:     []Anxiety (F41.9)/Depression (F32.9)   []Diabetes Type 1(E10.65) or 2 (E11.65)   []Rheumatoid Arthritis (M05.9)  []Fibromyalgia (M79.7)  []Neuropathy(G60.9)  [x]Osteoarthritis(M19.91)  []None   [x]Other: A fib     Visit # Insurance Allowable Auth Required   9  []  Yes []  No    From 10/23/20  to 2021     Date of Injury: 10/14/20  Date of Surgery: 10/20/20     Date of Patient follow up with Physician:      RESTRICTIONS/PRECAUTIONS: 5# weight limit, non weight bearing       Latex Allergy:  [x]? No      []? Yes                    Pacemaker:  [x]? No       []? Yes      Preferred Language for Healthcare:   [x]? English       []? other:      Functional Scale:  77% (Quick DASH)                                Date assessed:  10/23/2020     C-SSRS Triggered by Intake questionnaire (Past 2 wk assessment):    No, Questionnaire did not trigger screening.      SUBJECTIVE:  Doing my hair is still difficult       PAIN: 3/10  With use in shoulder        OBJECTIVE:   Date:  Hand Dominance: [x]?  Right    []? Left 10/23/2020    11/20/20  11/30/20  12/7/20  12/14/20  12/21/20  1/4/21    Objective Measures:           PAIN 5/10 4/10  3/10 3/10  3/10     Quick DASH 77%                                                         Digits tip to DPFC in cm WFL          Thumb ROM MP  IP         Thumb opposition  R:  L:         Thumb Radial/Palmar abd ROM R:  L:         Wrist ROM Ext/Flex R: 50/59  L: WNL        Rad/Uln dev ROM R:  L:         Forearm ROM  Sup/pron R: NT  L: WNL        Elbow ROM Ext/flex R: 50/72  L: WNL 30/90 23/97 21/106 21/110   Shoulder Flex  Shoulder Abd  Shoulder IR/ER AAROM shoulder flexion 50 deg  NT. Shoulder flex 45 (unable to hold it there) 58 (unable to hold)    Able to go up the wall farther today with the towel     Edema in cm circumf. MCPJs R: 19.0  L: 18.0          Edema in cm circumf. Wrist R:  L:          strength in lbs R:  L:   R: 21.6   L: 36.1 R: 18# R: 24.9    Pinch Strengthin lbs: lat  R:  L:         Pinch Strength in lbs:  3 point R:  L:            MMT:            Observations:  (including splints, bandages, incisions, scars): Pt painful and nervous to move. All measurements taken with brace on.                       MODALITIES: 10/23/20  10/30/20  11/6/20  11/20/20  11/30/20  12/7/20  12/14/20  12/21/20  1/4/21    Fluidotherapy (20780)            Estim (53624/88073)            Paraffin (63215)            US (30485)            Iontophoresis (33581)            Hot Pack            Cold Pack  10'                      INTERVENTIONS:            Therapeutic Exercise (23972) Issued AROM, shoulder, elbow, wrist FA, and hand . Pt.  Attempted demo of each with difficulty and pain  AAROM shoulder x 5 (difficult)    AROM elbow, forearm wrist and hand x 10 each direction gentle and with cues  AAROM shoulder x 5    AAROM by therapist x 5    Unable to initiate shoulder flexion on her own      AROM elbow, forearm and hand x 15 each  Scrunching towell with fingers Wrist cisor 5'    Yellow digiflex  X 20  AROM elbow, FA, wrist     5'    X 20  AROM elbow and FA        X 20  Same    AROM elbow and shoulder x 10  X 10 each    Shoulder shrugs  X 5  X 10  X 10  X 10  X 10 X 10  X 10  X 10  X 10    SS  X 10  X 10  X 10 X 10  X 10  X 10  X 10  X 10         Flex bar red wrist flex/ext x 10 (elbow tucked)     X 20 all motions    S/p with black mallet x 10 x 2  X 20 each      X 10 x 2  X 20 each    10 x 2      Flex bar x 20 each    15 x 2 1 wt        No money x 10 x 2  10 x 2     AAROM shoulder standing at finisher x 10     Up the wall x 5 (difficult)       X 10         X 10          X 10 each        X 5       X 10         X 5    Therapeutic Activity (60038)      AAROM shoulder flexion sitting in rolling chair at table x 10  X 10   Self PROM elbow and shoulder x 10           Rolling putty 3'  Rolling putty 3'               Manual Therapy (91615)   Retrograde mass to hand and forearm 8' 8' 8'       (IASTM, Dry Needling, manual mobilization)                        Neuromuscular Reeducation (19840) Education   Cues for exercises. Education and cues  Finisher table no resistance ladder climb and circles x 10 each   Finisher no resistance ladder climb and circles x 15 x 2  15 x 2       Cues for exercises  15 x 2  15 x 2       Cues and encouragement for shoulder flexion 15 x 2                                  ADL Training (49043)   Practiced getting brace on and off. Min A                                  HEP Training/Review See sheet(s)            See media file                        Splinting            Lcode:            Orthotic Mgmt, Subsequent Enc (47105)            Orthotic Mgmt & Training (99633)                        Other:  Redness and warmth noted in volar elbow, incsion is not red. Contacted MD and he moved her appointment from next Monday to this Monday. Less redness today.            Therapeutic Exercise & NMR: [x] (82157) Provided verbal/tactile cueing for activities related to strengthening, flexibility, endurance, ROM  for improvements in scapular, scapulothoracic and UE control with self care, reaching, carrying, lifting, house/yardwork, driving/computer work. [x] (49168) Provided verbal/tactile cueing for activities related to improving balance, coordination, kinesthetic sense, posture, motor skill, proprioception  to assist with  scapular, scapulothoracic and UE control with self care, reaching, carrying, lifting, house/yardwork, driving/computer work.     Therapeutic Activities & NMR:    [] (71936 or 52127) Provided verbal/tactile cueing for activities related to improving balance, coordination, kinesthetic sense, posture, motor skill, proprioception and motor activation to allow for proper function of scapular, scapulothoracic and UE control with self care, carrying, lifting, driving/computer work    Home Exercise Program:    [x] (52236) Reviewed/Progressed HEP activities related to strengthening, flexibility, endurance, ROM of scapular, scapulothoracic and UE control with self care, reaching, carrying, lifting, house/yardwork, driving/computer work  [] (29030) Reviewed/Progressed HEP activities related to improving balance, coordination, kinesthetic sense, posture, motor skill, proprioception of scapular, scapulothoracic and UE control with self care, reaching, carrying, lifting, house/yardwork, driving/computer work      Manual Treatments:  PROM / STM / Oscillations-Mobs:  G-I, II, III, IV (PA's, Inf., Post.) [] (78482) Provided manual therapy to mobilize soft tissue/joints of cervical/CT, scapular GHJ and UE for the purpose of modulating pain, promoting relaxation,  increasing ROM, reducing/eliminating soft tissue swelling/inflammation/restriction, improving soft tissue extensibility and allowing for proper ROM for normal function with self care, reaching, carrying, lifting, house/yardwork, driving/computer work    ADL Training:  [] (18087) Provided self-care/home management training related to activities of daily living and compensatory training, and/or use of adaptive equipment      Charges:  Timed Code Treatment Minutes: 38   Total Treatment Minutes: 38   Worker's Comp: Time In/Time Out     [] EVAL (LOW) 16650 (typically 20 minutes face-to-face)    [] EVAL (MOD) 50226 (typically 30 minutes face-to-face)  [] EVAL (HIGH) 61437 (typically 45 minutes face-to-face)  [] OT Re-eval (37152)       [x] Cindi [phone] x   2   [] LXPVZ(27798)  [x] NMR (79653) x  1   [] Estim (attended) (67577)   [] Manual ([phone]) x      [] US (67458)  [] TA () x      [] Paraffin (63782)  [] ADL  (61 [phone]) x     [] Splint/L code:    [] Estim (unattended) 33 93 31)  [] Fluidotherapy (27523)  [] DN 1-2 (11070)   [] DN 3+ ([phone])  [] Orthotic Mgmt, Subsequent Enc (10627)  [] Orthotic Mgmt & Training (07884)  [] Other:    ASSESSMENT:  Better tolerance for act. Good progress with elbow ROM     GOALS:  Patient stated goal: Be able to use her R arm     []? Progressing: []? Met: []? Not Met: []? Adjusted     Therapist goals for Patient:   Short Term Goals: To be achieved in: 2 weeks  1. Independent in HEP and progression per patient tolerance, in order to prevent re-injury. []? Progressing: [x]? Met: []? Not Met: []? Adjusted   2. Patient will have a decrease in pain to facilitate improvement in movement, function, and ADLs as indicated by Functional Deficits. []? Progressing: [x]? Met: []? Not Met: []?  Adjusted    Long Term Goals to be achieved in 12 weeks (through 12/23/20), including patient directed goals to address patient identified performance deficits:  1) Pt to be independent in graded HEP progression with a good level of effort and compliance. []? Progressing: []? Met: [x]? Not Met: []? Adjusted   2) Pt to report a score of </= 25% on the Quick DASH disability questionnaire for increased performance with carrying, moving, and handling objects. []? Progressing: []? Met: [x]? Not Met: []? Adjusted   3) Pt will demonstrate increased ROM to R elbow to 20/110 for improved independence with reaching her face and hair for grooming . [x]? Progressing: []? Met: []? Not Met: []? Adjusted   4) Pt will demonstrate increased strength to R  to 20# for improved independence with cooking. []? Progressing: []? Met: [x]? Not Met: []? Adjusted   5) Pt will have a decrease in pain to 0-3/10 to facilitate playing with grand kids . []? Progressing: [x]? Met: []? Not Met: []? Adjusted                 Overall Progression Towards Functional Goals/Treatment Progress Update:  [] Patient is progressing as expected towards functional goals listed. [x] Progression is slowed due to complexities/impairments listed. [] Progression has been slowed due to co-morbidities.   [] Plan just implemented, too soon to assess goals progression <30 days  [] Goals require adjustment due to lack of progress  [] Patient is not progressing as expected and requires additional follow up with physician  [] All goals are met  [] Other:     Prognosis for POC: [x] Good [] Fair  [] Poor    Patient requires continued skilled intervention: [x] Yes  [] No    Treatment/Activity Tolerance:  [x] Patient able to complete treatment  [] Patient limited by fatigue  [] Patient limited by pain    [] Patient limited by other medical complications  [] Other:                  PLAN: See eval  [x] Continue per plan of care [] Alter current plan (see comments above) [] Plan of care initiated [] Hold pending MD visit [] Discharge    Electronically signed by:  Radha Lopez OTR/L [id number]      Note: If patient does not return for scheduled/ recommended follow up visits, this note will serve as a discharge from care along with most recent update on progress.

## 2021-01-07 ENCOUNTER — ANTI-COAG VISIT (OUTPATIENT)
Dept: PHARMACY | Age: 81
End: 2021-01-07
Payer: MEDICARE

## 2021-01-07 LAB — INR BLD: 3

## 2021-01-07 PROCEDURE — 99211 OFF/OP EST MAY X REQ PHY/QHP: CPT

## 2021-01-07 PROCEDURE — 85610 PROTHROMBIN TIME: CPT

## 2021-01-11 ENCOUNTER — APPOINTMENT (OUTPATIENT)
Dept: OCCUPATIONAL THERAPY | Age: 81
End: 2021-01-11
Payer: MEDICARE

## 2021-01-12 ENCOUNTER — APPOINTMENT (OUTPATIENT)
Dept: CT IMAGING | Age: 81
End: 2021-01-12
Payer: MEDICARE

## 2021-01-12 ENCOUNTER — APPOINTMENT (OUTPATIENT)
Dept: GENERAL RADIOLOGY | Age: 81
End: 2021-01-12
Payer: MEDICARE

## 2021-01-12 ENCOUNTER — HOSPITAL ENCOUNTER (OUTPATIENT)
Age: 81
Setting detail: OBSERVATION
Discharge: HOME HEALTH | End: 2021-01-13
Attending: EMERGENCY MEDICINE | Admitting: INTERNAL MEDICINE
Payer: MEDICARE

## 2021-01-12 DIAGNOSIS — R42 DIZZINESS: ICD-10-CM

## 2021-01-12 DIAGNOSIS — R07.9 CHEST PAIN, UNSPECIFIED TYPE: Primary | ICD-10-CM

## 2021-01-12 LAB
A/G RATIO: 1.3 (ref 1.1–2.2)
ALBUMIN SERPL-MCNC: 4.3 G/DL (ref 3.4–5)
ALP BLD-CCNC: 181 U/L (ref 40–129)
ALT SERPL-CCNC: 18 U/L (ref 10–40)
ANION GAP SERPL CALCULATED.3IONS-SCNC: 12 MMOL/L (ref 3–16)
AST SERPL-CCNC: 29 U/L (ref 15–37)
BASOPHILS ABSOLUTE: 0 K/UL (ref 0–0.2)
BASOPHILS RELATIVE PERCENT: 0.7 %
BILIRUB SERPL-MCNC: 0.3 MG/DL (ref 0–1)
BUN BLDV-MCNC: 11 MG/DL (ref 7–20)
CALCIUM SERPL-MCNC: 9.7 MG/DL (ref 8.3–10.6)
CHLORIDE BLD-SCNC: 99 MMOL/L (ref 99–110)
CO2: 24 MMOL/L (ref 21–32)
CREAT SERPL-MCNC: <0.5 MG/DL (ref 0.6–1.2)
EKG ATRIAL RATE: 89 BPM
EKG DIAGNOSIS: NORMAL
EKG P AXIS: 50 DEGREES
EKG P-R INTERVAL: 140 MS
EKG Q-T INTERVAL: 364 MS
EKG QRS DURATION: 74 MS
EKG QTC CALCULATION (BAZETT): 442 MS
EKG R AXIS: 43 DEGREES
EKG T AXIS: 36 DEGREES
EKG VENTRICULAR RATE: 89 BPM
EOSINOPHILS ABSOLUTE: 0 K/UL (ref 0–0.6)
EOSINOPHILS RELATIVE PERCENT: 0.4 %
GFR AFRICAN AMERICAN: >60
GFR NON-AFRICAN AMERICAN: >60
GLOBULIN: 3.3 G/DL
GLUCOSE BLD-MCNC: 108 MG/DL (ref 70–99)
HCT VFR BLD CALC: 44.4 % (ref 36–48)
HEMOGLOBIN: 14.5 G/DL (ref 12–16)
INR BLD: 2.29 (ref 0.86–1.14)
LYMPHOCYTES ABSOLUTE: 2.3 K/UL (ref 1–5.1)
LYMPHOCYTES RELATIVE PERCENT: 33.5 %
MCH RBC QN AUTO: 29.3 PG (ref 26–34)
MCHC RBC AUTO-ENTMCNC: 32.6 G/DL (ref 31–36)
MCV RBC AUTO: 89.9 FL (ref 80–100)
MONOCYTES ABSOLUTE: 0.6 K/UL (ref 0–1.3)
MONOCYTES RELATIVE PERCENT: 8.5 %
NEUTROPHILS ABSOLUTE: 3.9 K/UL (ref 1.7–7.7)
NEUTROPHILS RELATIVE PERCENT: 56.9 %
PDW BLD-RTO: 16.1 % (ref 12.4–15.4)
PLATELET # BLD: 271 K/UL (ref 135–450)
PMV BLD AUTO: 7.6 FL (ref 5–10.5)
POTASSIUM SERPL-SCNC: 3.8 MMOL/L (ref 3.5–5.1)
PROTHROMBIN TIME: 26.8 SEC (ref 10–13.2)
RBC # BLD: 4.94 M/UL (ref 4–5.2)
SARS-COV-2, NAAT: NOT DETECTED
SODIUM BLD-SCNC: 135 MMOL/L (ref 136–145)
TOTAL PROTEIN: 7.6 G/DL (ref 6.4–8.2)
TROPONIN: <0.01 NG/ML
WBC # BLD: 6.9 K/UL (ref 4–11)

## 2021-01-12 PROCEDURE — 99285 EMERGENCY DEPT VISIT HI MDM: CPT

## 2021-01-12 PROCEDURE — 93005 ELECTROCARDIOGRAM TRACING: CPT | Performed by: EMERGENCY MEDICINE

## 2021-01-12 PROCEDURE — 84484 ASSAY OF TROPONIN QUANT: CPT

## 2021-01-12 PROCEDURE — G0378 HOSPITAL OBSERVATION PER HR: HCPCS

## 2021-01-12 PROCEDURE — 85610 PROTHROMBIN TIME: CPT

## 2021-01-12 PROCEDURE — 6370000000 HC RX 637 (ALT 250 FOR IP): Performed by: PHYSICIAN ASSISTANT

## 2021-01-12 PROCEDURE — 36415 COLL VENOUS BLD VENIPUNCTURE: CPT

## 2021-01-12 PROCEDURE — 80053 COMPREHEN METABOLIC PANEL: CPT

## 2021-01-12 PROCEDURE — 71045 X-RAY EXAM CHEST 1 VIEW: CPT

## 2021-01-12 PROCEDURE — 74174 CTA ABD&PLVS W/CONTRAST: CPT

## 2021-01-12 PROCEDURE — U0002 COVID-19 LAB TEST NON-CDC: HCPCS

## 2021-01-12 PROCEDURE — 6360000004 HC RX CONTRAST MEDICATION: Performed by: PHYSICIAN ASSISTANT

## 2021-01-12 PROCEDURE — 93010 ELECTROCARDIOGRAM REPORT: CPT | Performed by: INTERNAL MEDICINE

## 2021-01-12 PROCEDURE — 85025 COMPLETE CBC W/AUTO DIFF WBC: CPT

## 2021-01-12 RX ORDER — MECLIZINE HCL 12.5 MG/1
25 TABLET ORAL ONCE
Status: COMPLETED | OUTPATIENT
Start: 2021-01-12 | End: 2021-01-12

## 2021-01-12 RX ADMIN — NITROGLYCERIN 0.5 INCH: 20 OINTMENT TOPICAL at 17:48

## 2021-01-12 RX ADMIN — MECLIZINE 25 MG: 12.5 TABLET ORAL at 20:52

## 2021-01-12 RX ADMIN — IOPAMIDOL 75 ML: 755 INJECTION, SOLUTION INTRAVENOUS at 19:15

## 2021-01-12 NOTE — ED PROVIDER NOTES
EKG: Sinus rhythm rate of 89 bpm.  No ST elevation or arrhythmia. When compared to prior EKG from 10/14/2020 no change.       Nadeen Day MD  01/12/21 9737

## 2021-01-12 NOTE — ED PROVIDER NOTES
[facility] Emergency Department    CHIEF COMPLAINT  Chest Pain (patient started having some chest pressure today 11:30 with dizziness, no syncope today. BG 88, Hx afib)      SHARED SERVICE VISIT  I have seen and evaluated this patient with my supervising physician, Dr. Crisostomo Patient is a 79 y.o. female who presents to the ED complaining of Several hour history of substernal chest pressure. Patient brought in by squad. She rates pain at a 5 out of 10. Does not radiate. Has noted no aggravating or alleviating factors. Patient non-smoker. No cough or congestion. Denies fevers chills. No associated shortness of breath. No pain with deep inspiration. She denies any neck, arm, jaw or back pain. no abdominal pain. No nausea, vomiting or diarrhea. Has had no leg pain or swelling. No urinary symptoms. Reports that she did have some dizziness without headaches or confusion. No visual changes or disturbances. No difficulty speaking or swallowing. No numbness, tingling, weakness of extremities. History of A. fib. On Coumadin. No other complaints, modifying factors or associated symptoms. Nursing notes reviewed.    Past Medical History:   Diagnosis Date    Arthritis     spine and left hip    Atrial fibrillation ([facility] [address]     Colonic polyp 07/31/2007    Paroxysmal atrial fibrillation ([address] 8/6/2015    Vertigo      Past Surgical History:   Procedure Laterality Date    COLONOSCOPY  07/31/2007    HUMERUS FRACTURE SURGERY Right 10/20/2020    OPEN REDUCTION INTERNAL FIXATION RIGHT DISTAL HUMERUS FRACTURE performed by Nancy Velazquez MD at [place] [id number] Left 04/2016    hip    TONSILLECTOMY       Family History   Problem Relation Age of Onset    Colon Cancer Mother 61    Heart Disease Brother      Social History     Socioeconomic History    Marital status:      Spouse name: Not on file  Number of children: Not on file    Years of education: Not on file    Highest education level: Not on file   Occupational History    Not on file   Social Needs    Financial resource strain: Not on file    Food insecurity     Worry: Not on file     Inability: Not on file    Transportation needs     Medical: Not on file     Non-medical: Not on file   Tobacco Use    Smoking status: Never Smoker    Smokeless tobacco: Never Used   Substance and Sexual Activity    Alcohol use: Yes     Alcohol/week: 7.0 - 10.0 standard drinks     Types: 7 - 10 Shots of liquor per week     Comment: occ    Drug use: No    Sexual activity: Yes   Lifestyle    Physical activity     Days per week: Not on file     Minutes per session: Not on file    Stress: Not on file   Relationships    Social connections     Talks on phone: Not on file     Gets together: Not on file     Attends Mandaen service: Not on file     Active member of club or organization: Not on file     Attends meetings of clubs or organizations: Not on file     Relationship status: Not on file    Intimate partner violence     Fear of current or ex partner: Not on file     Emotionally abused: Not on file     Physically abused: Not on file     Forced sexual activity: Not on file   Other Topics Concern    Not on file   Social History Narrative    Not on file     No current facility-administered medications for this encounter.       Current Outpatient Medications   Medication Sig Dispense Refill    vitamin D (ERGOCALCIFEROL) 1.25 MG (25029 UT) CAPS capsule Take 1 capsule by mouth once a week 12 capsule 0    dilTIAZem (TIADYLT ER) 120 MG extended release capsule TAKE 1 CAPSULE BY MOUTH EVERY DAY 90 capsule 3    warfarin (COUMADIN) 5 MG tablet Take 1 tablet by mouth daily (Patient taking differently: Take 5 mg by mouth daily 5 mg three times weekly 2.5 mg four times weekly) 90 tablet 1  meclizine (ANTIVERT) 25 MG tablet Take 25 mg by mouth 3 times daily as needed      Glucosamine Sulfate 750 MG TABS Take 1 tablet by mouth daily.  Multiple Vitamin (MULTIVITAMIN PO) Take  by mouth.  LUTEIN PO Take  by mouth. Allergies   Allergen Reactions    Lyrica [Pregabalin] Other (See Comments)     fainted       REVIEW OF SYSTEMS  10 systems reviewed, pertinent positives per HPI otherwise noted to be negative    PHYSICAL EXAM  There were no vitals taken for this visit. GENERAL APPEARANCE: Awake and alert. Cooperative. No acute distress. HEAD: Normocephalic. Atraumatic. EYES: PERRL. EOM's grossly intact. ENT: Mucous membranes are moist.   NECK: Supple. No JVD. No tracheal tenderness or deviation. No crepitus. HEART: RRR. No murmurs. Mildly reproducible chest wall tenderness. No paradoxical motion. No subcutaneous emphysema. LUNGS: Respirations unlabored. CTAB. Good air exchange. Speaking comfortably in full sentences. No wheezing, rhonchi, rales. ABDOMEN: Soft. Non-distended. Non-tender. No guarding or rebound. Negative Gallagher's, McBurney's and Rovsing's. No fluid waves or ascites. No hernias or masses. No midline pulsatile mass. Bowel sounds normal quadrants. No CVA tenderness. EXTREMITIES: No peripheral edema. No unilateral calf pain, redness or swelling. Moves all extremities equally. All extremities neurovascularly intact. SKIN: Warm and dry. No acute rashes. NEUROLOGICAL: Alert and oriented. CN's 2-12 intact. No gross facial drooping. Strength 5/5, sensation intact. PSYCHIATRIC: Normal mood and affect.     RADIOLOGY  Xr Chest Portable    Result Date: 1/12/2021 EXAMINATION: ONE XRAY VIEW OF THE CHEST 1/12/2021 5:07 pm COMPARISON: 10/14/2020 HISTORY: ORDERING SYSTEM PROVIDED HISTORY: chest pain TECHNOLOGIST PROVIDED HISTORY: Reason for exam:->chest pain FINDINGS: Cardiac leads project over the chest.  Elevation of right hemidiaphragm again noted. No confluent airspace disease. No pleural effusion or pneumothorax. Cardiac and mediastinal silhouettes are similar to prior. No acute cardiopulmonary disease.      Cta Chest Abdomen Pelvis W Contrast    Result Date: 1/12/2021 EXAMINATION: CTA OF THE CHEST, ABDOMEN AND PELVIS WITH CONTRAST, 1/12/2021 7:02 pm TECHNIQUE: CTA of the chest, abdomen and pelvis was performed after the administration of intravenous contrast.  Multiplanar reformatted images are provided for review. MIP images are provided for review. Dose modulation, iterative reconstruction, and/or weight based adjustment of the mA/kV was utilized to reduce the radiation dose to as low as reasonably achievable. COMPARISON: None. HISTORY: ORDERING SYSTEM PROVIDED HISTORY: cp/nausea/dizzy TECHNOLOGIST PROVIDED HISTORY: Reason for exam:->cp/nausea/dizzy Reason for Exam: patient complains of dizziness and pressure in center of chest. Acuity: Acute Type of Exam: Initial FINDINGS: CTA CHEST: The thoracic aorta is tortuous. No dissection or aneurysm is identified. Great vessels are tortuous is well. There is no significant stenosis identified. There is no mediastinal or hilar adenopathy. There is a small hiatal hernia. Mild atelectasis is noted dependently in the lower lobes. There is mild anterior height loss at T8. No acute fracture line is seen. There is severe multilevel spondylosis. The chest wall is unremarkable. CTA ABDOMEN/PELVIS: The aortoiliac vessels are widely patent. There is no dissection or aneurysm. Common femoral arteries are patent bilaterally. The celiac trunk, superior mesenteric artery and inferior mesenteric artery are widely patent. Renal arteries are widely patent bilaterally. Hepatic steatosis is present. The gallbladder, pancreas, spleen and adrenal glands are unremarkable. The kidneys enhance symmetrically. There is no hydronephrosis. The stomach and small bowel are unremarkable. There is no focal mural thickening of the colon appreciated. The urinary bladder and uterus are unremarkable. No adenopathy is seen. At L3-4 and L4-5 there is mild anterolisthesis associated with facet arthropathy. No acute osseous abnormalities are seen.   Left hip prosthesis is unremarkable. Abdominal wall is unremarkable. Chest- No acute vascular abnormality in the chest.  No dissection or aneurysm is identified. Mild dependent atelectasis bilaterally. --- Abdomen pelvis- No acute vascular abnormality in the abdomen or pelvis. No dissection, aneurysm or acute embolus is identified. Hepatic steatosis. No acute inflammatory abnormality is appreciated. ED COURSE  Patient received aspirin and Nitropaste for pain, with good relief. Triage vitals stable. CBC without leukocytosis or anemia. CMP unremarkable. Troponin less than 0.01. Rapid Covid negative. INR therapeutic at 2.29. Stable portable chest x-ray without evidence to suggest acute cardiopulmonary disease. EKG was normal sinus rhythm without evidence for acute ischemia. Patient states that her dizziness has been going on for some time now. States that it is getting worse. Requesting dose of meclizine. That was given with improvement of symptoms. Did obtain CTA chest abdomen pelvis and it demonstrates no acute aortic pathology or any other acute findings in chest or abdomen. We are at this time recommending admission for further evaluation of chest pain. Discussed case with hospital medicine and orders placed. A discussion was had with Ms. Jeni Atkins regarding her chest pain, ED findings and recommendations for admission. Risk management discussed and shared decision making had with patient and/or surrogate. All questions were answered. Patient is in agreement.     MDM  Results for orders placed or performed during the hospital encounter of 01/12/21   Troponin   Result Value Ref Range    Troponin <0.01 <0.01 ng/mL   CBC auto differential   Result Value Ref Range    WBC 6.9 4.0 - 11.0 K/uL    RBC 4.94 4.00 - 5.20 M/uL    Hemoglobin 14.5 12.0 - 16.0 g/dL    Hematocrit 44.4 36.0 - 48.0 %    MCV 89.9 80.0 - 100.0 fL    MCH 29.3 26.0 - 34.0 pg    MCHC 32.6 31.0 - 36.0 g/dL    RDW 16.1 (H) 12.4 - 15.4 % Platelets 454 745 - 439 K/uL    MPV 7.6 5.0 - 10.5 fL    Neutrophils % 56.9 %    Lymphocytes % 33.5 %    Monocytes % 8.5 %    Eosinophils % 0.4 %    Basophils % 0.7 %    Neutrophils Absolute 3.9 1.7 - 7.7 K/uL    Lymphocytes Absolute 2.3 1.0 - 5.1 K/uL    Monocytes Absolute 0.6 0.0 - 1.3 K/uL    Eosinophils Absolute 0.0 0.0 - 0.6 K/uL    Basophils Absolute 0.0 0.0 - 0.2 K/uL   Comprehensive metabolic panel   Result Value Ref Range    Sodium 135 (L) 136 - 145 mmol/L    Potassium 3.8 3.5 - 5.1 mmol/L    Chloride 99 99 - 110 mmol/L    CO2 24 21 - 32 mmol/L    Anion Gap 12 3 - 16    Glucose 108 (H) 70 - 99 mg/dL    BUN 11 7 - 20 mg/dL    CREATININE <0.5 (L) 0.6 - 1.2 mg/dL    GFR Non-African American >60 >60    GFR African American >60 >60    Calcium 9.7 8.3 - 10.6 mg/dL    Total Protein 7.6 6.4 - 8.2 g/dL    Alb 4.3 3.4 - 5.0 g/dL    Albumin/Globulin Ratio 1.3 1.1 - 2.2    Total Bilirubin 0.3 0.0 - 1.0 mg/dL    Alkaline Phosphatase 181 (H) 40 - 129 U/L    ALT 18 10 - 40 U/L    AST 29 15 - 37 U/L    Globulin 3.3 g/dL   Protime-INR   Result Value Ref Range    Protime 26.8 (H) 10.0 - 13.2 sec    INR 2.29 (H) 0.86 - 1.14   COVID-19   Result Value Ref Range    SARS-CoV-2, NAAT Not Detected Not Detected   EKG 12 Lead   Result Value Ref Range    Ventricular Rate 89 BPM    Atrial Rate 89 BPM    P-R Interval 140 ms    QRS Duration 74 ms    Q-T Interval 364 ms    QTc Calculation (Bazett) 442 ms    P Axis 50 degrees    R Axis 43 degrees    T Axis 36 degrees    Diagnosis       Normal sinus rhythmNormal ECGWhen compared with ECG of 14-OCT-2020 12:11,No significant change was foundConfirmed by Kenji Greenwood ([id number]) on 1/12/2021 5:56:35 PM I spoke with Dr. Rema Angulo and NP Kady Rousseau. We thoroughly discussed the history, physical exam, laboratory and imaging studies, as well as, emergency department course. Based upon that discussion, we've decided to admit Pati Frias to the hospital for further observation, evaluation and treatment. Final Impression  1. Chest pain, unspecified type    2. Dizziness      Blood pressure (!) 115/104, pulse 89, temperature 97.6 °F (36.4 °C), temperature source Oral, resp. rate 14, height 5' 2\" (1.575 m), weight 180 lb (81.6 kg), SpO2 97 %. DISPOSITION  Patient was admitted to the hospital in stable condition.          Miladys Briones, [id number]  01/12/21 2121

## 2021-01-12 NOTE — ED NOTES
Bed: 08  Expected date:   Expected time:   Means of arrival:   Comments:  Pradeep Lim RN  01/12/21 [id number]

## 2021-01-12 NOTE — ED NOTES
Patient identified as a positive fall risk on the ED triage fall screening. Patient placed in fall precautions which includes:  yellow fall risk bracelet on wrist, yellow socks at bedside, \"Be Safe\" sign placed on patient's door, and bed alarm placed under patient/alarm turned on. Patient instructed on importance of not getting out of bed or ambulating without assistance for safety.           Ekta Mensah RN  01/12/21 [id number]

## 2021-01-12 NOTE — ED NOTES

## 2021-01-13 VITALS
WEIGHT: 186.1 LBS | RESPIRATION RATE: 18 BRPM | OXYGEN SATURATION: 94 % | HEART RATE: 88 BPM | TEMPERATURE: 97.7 F | DIASTOLIC BLOOD PRESSURE: 69 MMHG | HEIGHT: 62 IN | SYSTOLIC BLOOD PRESSURE: 104 MMHG | BODY MASS INDEX: 34.24 KG/M2

## 2021-01-13 LAB
ANION GAP SERPL CALCULATED.3IONS-SCNC: 9 MMOL/L (ref 3–16)
BUN BLDV-MCNC: 12 MG/DL (ref 7–20)
CALCIUM SERPL-MCNC: 8.8 MG/DL (ref 8.3–10.6)
CHLORIDE BLD-SCNC: 107 MMOL/L (ref 99–110)
CHOLESTEROL, TOTAL: 138 MG/DL (ref 0–199)
CO2: 24 MMOL/L (ref 21–32)
CREAT SERPL-MCNC: <0.5 MG/DL (ref 0.6–1.2)
GFR AFRICAN AMERICAN: >60
GFR NON-AFRICAN AMERICAN: >60
GLUCOSE BLD-MCNC: 102 MG/DL (ref 70–99)
HCT VFR BLD CALC: 39 % (ref 36–48)
HDLC SERPL-MCNC: 43 MG/DL (ref 40–60)
HEMOGLOBIN: 12.9 G/DL (ref 12–16)
INR BLD: 2.61 (ref 0.86–1.14)
LDL CHOLESTEROL CALCULATED: 82 MG/DL
MCH RBC QN AUTO: 29.6 PG (ref 26–34)
MCHC RBC AUTO-ENTMCNC: 33 G/DL (ref 31–36)
MCV RBC AUTO: 89.6 FL (ref 80–100)
PDW BLD-RTO: 16 % (ref 12.4–15.4)
PLATELET # BLD: 229 K/UL (ref 135–450)
PMV BLD AUTO: 7.6 FL (ref 5–10.5)
POTASSIUM REFLEX MAGNESIUM: 3.6 MMOL/L (ref 3.5–5.1)
PROTHROMBIN TIME: 30.6 SEC (ref 10–13.2)
RBC # BLD: 4.35 M/UL (ref 4–5.2)
SODIUM BLD-SCNC: 140 MMOL/L (ref 136–145)
TRIGL SERPL-MCNC: 67 MG/DL (ref 0–150)
TROPONIN: <0.01 NG/ML
TROPONIN: <0.01 NG/ML
VLDLC SERPL CALC-MCNC: 13 MG/DL
WBC # BLD: 6.2 K/UL (ref 4–11)

## 2021-01-13 PROCEDURE — 99214 OFFICE O/P EST MOD 30 MIN: CPT | Performed by: INTERNAL MEDICINE

## 2021-01-13 PROCEDURE — 97161 PT EVAL LOW COMPLEX 20 MIN: CPT

## 2021-01-13 PROCEDURE — 80061 LIPID PANEL: CPT

## 2021-01-13 PROCEDURE — 2580000003 HC RX 258: Performed by: NURSE PRACTITIONER

## 2021-01-13 PROCEDURE — 97530 THERAPEUTIC ACTIVITIES: CPT

## 2021-01-13 PROCEDURE — 80048 BASIC METABOLIC PNL TOTAL CA: CPT

## 2021-01-13 PROCEDURE — G0378 HOSPITAL OBSERVATION PER HR: HCPCS

## 2021-01-13 PROCEDURE — 97165 OT EVAL LOW COMPLEX 30 MIN: CPT

## 2021-01-13 PROCEDURE — 85610 PROTHROMBIN TIME: CPT

## 2021-01-13 PROCEDURE — 97535 SELF CARE MNGMENT TRAINING: CPT

## 2021-01-13 PROCEDURE — 84484 ASSAY OF TROPONIN QUANT: CPT

## 2021-01-13 PROCEDURE — 85027 COMPLETE CBC AUTOMATED: CPT

## 2021-01-13 PROCEDURE — 6370000000 HC RX 637 (ALT 250 FOR IP): Performed by: NURSE PRACTITIONER

## 2021-01-13 RX ORDER — PROMETHAZINE HYDROCHLORIDE 25 MG/1
12.5 TABLET ORAL EVERY 6 HOURS PRN
Status: DISCONTINUED | OUTPATIENT
Start: 2021-01-13 | End: 2021-01-13 | Stop reason: HOSPADM

## 2021-01-13 RX ORDER — MECLIZINE HCL 12.5 MG/1
25 TABLET ORAL 3 TIMES DAILY PRN
Status: DISCONTINUED | OUTPATIENT
Start: 2021-01-13 | End: 2021-01-13 | Stop reason: HOSPADM

## 2021-01-13 RX ORDER — ASPIRIN 81 MG/1
81 TABLET, CHEWABLE ORAL DAILY
Status: DISCONTINUED | OUTPATIENT
Start: 2021-01-13 | End: 2021-01-13 | Stop reason: HOSPADM

## 2021-01-13 RX ORDER — SODIUM CHLORIDE 0.9 % (FLUSH) 0.9 %
10 SYRINGE (ML) INJECTION EVERY 12 HOURS SCHEDULED
Status: DISCONTINUED | OUTPATIENT
Start: 2021-01-13 | End: 2021-01-13 | Stop reason: HOSPADM

## 2021-01-13 RX ORDER — ATORVASTATIN CALCIUM 40 MG/1
40 TABLET, FILM COATED ORAL NIGHTLY
Status: DISCONTINUED | OUTPATIENT
Start: 2021-01-13 | End: 2021-01-13 | Stop reason: HOSPADM

## 2021-01-13 RX ORDER — WARFARIN SODIUM 2.5 MG/1
2.5 TABLET ORAL
Status: DISCONTINUED | OUTPATIENT
Start: 2021-01-13 | End: 2021-01-13 | Stop reason: HOSPADM

## 2021-01-13 RX ORDER — ACETAMINOPHEN 325 MG/1
650 TABLET ORAL EVERY 6 HOURS PRN
Status: DISCONTINUED | OUTPATIENT
Start: 2021-01-13 | End: 2021-01-13 | Stop reason: HOSPADM

## 2021-01-13 RX ORDER — WARFARIN SODIUM 2.5 MG/1
2.5 TABLET ORAL
Status: COMPLETED | OUTPATIENT
Start: 2021-01-13 | End: 2021-01-13

## 2021-01-13 RX ORDER — ONDANSETRON 2 MG/ML
4 INJECTION INTRAMUSCULAR; INTRAVENOUS EVERY 6 HOURS PRN
Status: DISCONTINUED | OUTPATIENT
Start: 2021-01-13 | End: 2021-01-13 | Stop reason: HOSPADM

## 2021-01-13 RX ORDER — ACETAMINOPHEN 650 MG/1
650 SUPPOSITORY RECTAL EVERY 6 HOURS PRN
Status: DISCONTINUED | OUTPATIENT
Start: 2021-01-13 | End: 2021-01-13 | Stop reason: HOSPADM

## 2021-01-13 RX ORDER — SODIUM CHLORIDE 0.9 % (FLUSH) 0.9 %
10 SYRINGE (ML) INJECTION PRN
Status: DISCONTINUED | OUTPATIENT
Start: 2021-01-13 | End: 2021-01-13 | Stop reason: HOSPADM

## 2021-01-13 RX ORDER — DILTIAZEM HYDROCHLORIDE 120 MG/1
120 CAPSULE, COATED, EXTENDED RELEASE ORAL DAILY
Status: DISCONTINUED | OUTPATIENT
Start: 2021-01-13 | End: 2021-01-13 | Stop reason: HOSPADM

## 2021-01-13 RX ADMIN — ATORVASTATIN CALCIUM 40 MG: 40 TABLET, FILM COATED ORAL at 00:49

## 2021-01-13 RX ADMIN — Medication 10 ML: at 08:34

## 2021-01-13 RX ADMIN — MECLIZINE 25 MG: 12.5 TABLET ORAL at 00:48

## 2021-01-13 RX ADMIN — DILTIAZEM HYDROCHLORIDE 120 MG: 120 CAPSULE, COATED, EXTENDED RELEASE ORAL at 00:48

## 2021-01-13 RX ADMIN — WARFARIN SODIUM 2.5 MG: 2.5 TABLET ORAL at 00:49

## 2021-01-13 ASSESSMENT — PAIN SCALES - GENERAL
PAINLEVEL_OUTOF10: 0
PAINLEVEL_OUTOF10: 0

## 2021-01-13 NOTE — DISCHARGE SUMMARY
 Pharmacy to dose Coumadin, thank you     Obesity -  With Body mass index is 34.04 kg/m². Complicating assessment and treatment. Placing patient at risk for multiple co-morbidities as well as early death and contributing to the patient's presentation. Counseled on weight loss    Physical Exam Performed:     /69   Pulse 88   Temp 97.7 °F (36.5 °C) (Oral)   Resp 18   Ht 5' 2\" (1.575 m)   Wt 186 lb 1.6 oz (84.4 kg)   SpO2 94%   BMI 34.04 kg/m²       General appearance:  No apparent distress, appears stated age and cooperative. HEENT:  Normal cephalic, atraumatic without obvious deformity. Pupils equal, round, and reactive to light. Extra ocular muscles intact. Conjunctivae/corneas clear. Neck: Supple, with full range of motion. No jugular venous distention. Trachea midline. Respiratory:  Normal respiratory effort. Clear to auscultation, bilaterally without Rales/Wheezes/Rhonchi. Cardiovascular:  Regular rate and rhythm with normal S1/S2 without murmurs, rubs or gallops. Abdomen: Soft, non-tender, non-distended with normal bowel sounds. Musculoskeletal:  No clubbing, cyanosis or edema bilaterally. Full range of motion without deformity. Skin: Skin color, texture, turgor normal.  No rashes or lesions. Neurologic:  Neurovascularly intact without any focal sensory/motor deficits. Cranial nerves: II-XII intact, grossly non-focal.  Psychiatric:  Alert and oriented, thought content appropriate, normal insight  Capillary Refill: Brisk,< 3 seconds   Peripheral Pulses: +2 palpable, equal bilaterally       Labs:  For convenience and continuity at follow-up the following most recent labs are provided:      CBC:    Lab Results   Component Value Date    WBC 6.2 01/13/2021    HGB 12.9 01/13/2021    HCT 39.0 01/13/2021     01/13/2021       Renal:    Lab Results   Component Value Date     01/13/2021    K 3.6 01/13/2021     01/13/2021    CO2 24 01/13/2021    BUN 12 01/13/2021 Thank you Tian Bynum MD for the opportunity to be involved in this patient's care. If you have any questions or concerns please feel free to contact me at [phone].

## 2021-01-13 NOTE — CONSULTS
[facility]  [phone]      Attending Physician: Jayce Judge MD  Reason for Consultation/Chief Complaint: Chest pain and dizziness    Subjective   History of Present Illness:  Minnie Aguilar is a 79 y.o. patient who presented to the hospital with complaints of chest pain and dizziness for the last day. Patient presented to the emergency room yesterday, was admitted to the hospital, troponin levels been found to be negative. She feels better today. She denies shortness of breath. She denies any recent changes in her medications although she is status post a fall about 2 months ago with a right arm fracture that required surgery and she is been wearing a brace for this. Patient has a cardiac history which dates back to 2015, was diagnosed with atrial fibrillation, she been following up in our office with our EP service, has been on chronic anticoagulation with Coumadin, INR was therapeutic during assessment during this hospital admission. She is also been treated with all times and has been doing well with this. Past Medical History:   has a past medical history of Arthritis, Atrial fibrillation ([address] Colonic polyp, Paroxysmal atrial fibrillation ([address] and Vertigo. Surgical History:   has a past surgical history that includes Tonsillectomy; Colonoscopy (07/31/2007); joint replacement (Left, 04/2016); and Humerus fracture surgery (Right, 10/20/2020). Social History:   reports that she has never smoked. She has never used smokeless tobacco. She reports current alcohol use of about 7.0 - 10.0 standard drinks of alcohol per week. She reports that she does not use drugs. Family History:  family history includes Colon Cancer (age of onset: 61) in her mother; Heart Disease in her brother.       Home Medications:  Were reviewed and are listed in nursing record and/or below  Prior to Admission medications Medication Sig Start Date End Date Taking? Authorizing Provider   vitamin D (ERGOCALCIFEROL) 1.25 MG (20797 UT) CAPS capsule Take 1 capsule by mouth once a week 10/20/20  Yes John Dunn MD   dilTIAZem (TIADYLT ER) 120 MG extended release capsule TAKE 1 CAPSULE BY MOUTH EVERY DAY 9/24/20  Yes JENNIFER Bolden CNP   warfarin (COUMADIN) 5 MG tablet Take 1 tablet by mouth daily  Patient taking differently: Take 5 mg by mouth daily 5 mg three times weekly 2.5 mg four times weekly 6/9/20  Yes JENINFER Bolden CNP   meclizine (ANTIVERT) 25 MG tablet Take 25 mg by mouth 3 times daily as needed   Yes Historical Provider, MD   Multiple Vitamin (MULTIVITAMIN PO) Take  by mouth. Yes Historical Provider, MD   Glucosamine Sulfate 750 MG TABS Take 1 tablet by mouth daily. Historical Provider, MD   LUTEIN PO Take  by mouth.       Historical Provider, MD        CURRENT Medications:      dilTIAZem (CARDIZEM CD) extended release capsule 120 mg, Daily      meclizine (ANTIVERT) tablet 25 mg, TID PRN      sodium chloride flush 0.9 % injection 10 mL, 2 times per day      sodium chloride flush 0.9 % injection 10 mL, PRN      promethazine (PHENERGAN) tablet 12.5 mg, Q6H PRN    Or      ondansetron (ZOFRAN) injection 4 mg, Q6H PRN      acetaminophen (TYLENOL) tablet 650 mg, Q6H PRN    Or      acetaminophen (TYLENOL) suppository 650 mg, Q6H PRN      magnesium hydroxide (MILK OF MAGNESIA) 400 MG/5ML suspension 30 mL, Daily PRN      aspirin chewable tablet 81 mg, Daily      atorvastatin (LIPITOR) tablet 40 mg, Nightly      nitroglycerin (NITRO-BID) 2 % ointment 1 inch, Q6H PRN      warfarin (COUMADIN) daily dosing (placeholder), RX Placeholder      warfarin (COUMADIN) tablet 2.5 mg, Once      regadenoson (LEXISCAN) injection 0.4 mg, ONCE PRN        Allergies:  Lyrica [pregabalin]     Review of Systems: A 14 point review of symptoms completed. Pertinent positives identified in the HPI, all other review of symptoms negative as below.       Objective   PHYSICAL EXAM:    Vitals:    01/13/21 1130   BP: 104/69   Pulse: 88   Resp: 18   Temp: 97.7 °F (36.5 °C)   SpO2:     Weight: 186 lb 1.6 oz (84.4 kg)         General Appearance:  Alert, cooperative, no distress, appears stated age   Head:  Normocephalic, without obvious abnormality, atraumatic   Eyes:  PERRL, conjunctiva/corneas clear   Nose: Nares normal, no drainage or sinus tenderness   Throat: Not able to examine due to mask     Neck: Supple, symmetrical, trachea midline, no adenopathy, thyroid: not enlarged, symmetric, no tenderness/mass/nodules, no carotid bruit or JVD   Lungs:   Clear to auscultation bilaterally, respirations unlabored   Chest Wall:  No deformity or tenderness   Heart:  Regular rate and rhythm, S1, S2 normal, no murmur, rub or gallop   Abdomen:   Soft, non-tender, bowel sounds active all four quadrants,  no masses, no organomegaly   Extremities: Extremities with right arm brace there is no edema throughout the extremities   Pulses: 2+ and symmetric   Skin: Skin color, texture, turgor normal, no rashes or lesions   Pysch: Normal mood and affect   Neurologic: Normal gross motor and sensory exam.         Labs   CBC:   Lab Results   Component Value Date    WBC 6.2 01/13/2021    RBC 4.35 01/13/2021    HGB 12.9 01/13/2021    HCT 39.0 01/13/2021    MCV 89.6 01/13/2021    RDW 16.0 01/13/2021     01/13/2021     CMP:  Lab Results   Component Value Date     01/13/2021    K 3.6 01/13/2021     01/13/2021    CO2 24 01/13/2021    BUN 12 01/13/2021    CREATININE <0.5 01/13/2021    GFRAA >60 01/13/2021    AGRATIO 1.3 01/12/2021    LABGLOM >60 01/13/2021    GLUCOSE 102 01/13/2021    PROT 7.6 01/12/2021    CALCIUM 8.8 01/13/2021    BILITOT 0.3 01/12/2021    ALKPHOS 181 01/12/2021    AST 29 01/12/2021    ALT 18 01/12/2021 PT/INR:  No results found for: PTINR  HgBA1c:No results found for: LABA1C  Lab Results   Component Value Date    TROPONINI <0.01 01/13/2021         Cardiac Data     Last EKG: Normal sinus rhythm, normal EKG    Echo:    2015    Left ventricle size is normal.   Moderate concentric left ventricular hypertrophy is present. Global ejection fraction is normal and estimated from 55 % to 60 %. No regional wall motion abnormalities are noted. Diastolic filling parameters suggests normal diastolic function . Mild thickening of leaflets of mitral valve. Mild mitral regurgitation is present. Mild posterior mitral annulus   calcification is present. No mitral stenosis. Aortic valve appears sclerotic but opens adequately. No evidence of aortic valve regurgitation. Normal right ventricular size . Left ventricular contractility appears normal But   TAPSE values reduced. Stress Test:    Cath:    Studies:     CTA chest/abdomen:       Impression   Chest-       No acute vascular abnormality in the chest.  No dissection or aneurysm is   identified.       Mild dependent atelectasis bilaterally.       ---       Abdomen pelvis-       No acute vascular abnormality in the abdomen or pelvis.  No dissection,   aneurysm or acute embolus is identified.       Hepatic steatosis.       No acute inflammatory abnormality is appreciated. I have reviewed labs and imaging/xray/diagnostic testing in this note. Assessment and Plan          Patient Active Problem List   Diagnosis    PAF (paroxysmal atrial fibrillation) (HCC)    Acidosis    Closed displaced oblique fracture of shaft of right humerus    H/O fracture of humerus    Chest pain       Chest pain,  dizziness, will evaluate further with echocardiogram and stress test, this can be done as an outpatient. Can consider also a Holter/event monitor as outpatient.     Atrial fibrillation, continue Coumadin diltiazem    We will sign off, please call with questions Thank you for allowing us to participate in the care of Atul Hunter. Please call me with any questions [phone].     Trisha Molina MD, [address] [facility]   Interventional Cardiologist  [address]  [phone] [facility]  [phone] [address] [place] [address]  1/13/2021 2:17 PM

## 2021-01-13 NOTE — PROGRESS NOTES
Physical Therapy    Facility/Department: [facility] A2 CARD TELEMETRY  Initial Assessment/DC summary     NAME: Nena Hashimoto  : 1940  MRN: [id number]    Date of Service: 2021    Discharge Recommendations:  Home with assist PRN, Outpatient PT(vestibular rehab)   PT Equipment Recommendations  Equipment Needed: No    Assessment   Body structures, Functions, Activity limitations: Decreased functional mobility   Assessment: Pt functioning slightly below baseline due to deconditioning. Using a cane PRN at baseline but now want to use a walker to help her ambulate more often and further. Pt reports h/o vertigo and interested in OP PT to address balance deficits. No acute needs currently. Treatment Diagnosis: deconditioning  Prognosis: Excellent  Decision Making: Low Complexity  PT Education: Goals; General Safety; Disease Specific Education;PT Role;Plan of Care  Patient Education: Pt expressed understanding of increasing activity safely at home and benefits of OP PT  Barriers to Learning: none  REQUIRES PT FOLLOW UP: No  Activity Tolerance  Activity Tolerance: Patient Tolerated treatment well       Patient Diagnosis(es): The primary encounter diagnosis was Chest pain, unspecified type. A diagnosis of Dizziness was also pertinent to this visit. has a past medical history of Arthritis, Atrial fibrillation ([address] Colonic polyp, Paroxysmal atrial fibrillation ([address] and Vertigo. has a past surgical history that includes Tonsillectomy; Colonoscopy (2007); joint replacement (Left, 2016); and Humerus fracture surgery (Right, 10/20/2020).     Restrictions  Restrictions/Precautions  Restrictions/Precautions: Fall Risk  Position Activity Restriction  Other position/activity restrictions: high fall risk per nsg, up with assist  Vision/Hearing  Vision: Within Functional Limits  Hearing: Within functional limits     Subjective  General  Chart Reviewed: Yes  Patient assessed for rehabilitation services?: Yes Response To Previous Treatment: Not applicable  Family / Caregiver Present: No  Referring Practitioner: Sandra Ceja  Referral Date : 01/13/21  Diagnosis: chest pain  Follows Commands: Within Functional Limits  General Comment  Comments: cleared by nursing  Subjective  Subjective: pt sitting EOB finishing with OT evaluation  Pain Screening  Patient Currently in Pain: Denies          Orientation  Orientation  Overall Orientation Status: Within Normal Limits  Social/Functional History  Social/Functional History  Lives With: Spouse  Type of Home: House  Home Layout: One level, Laundry in basement( has been doing laundry)  Home Access: Stairs to enter with rails  Entrance Stairs - Number of Steps: 3 LESVIA  Bathroom Shower/Tub: Tub/Shower unit  Bathroom Toilet: Handicap height  Bathroom Equipment: (reports in process of getting grab bars)  Home Equipment: Rolling walker, Cane  ADL Assistance: Independent  Homemaking Assistance: Needs assistance  Homemaking Responsibilities: Yes( assists with laundry, cooking and pt/ share cleaning)  Ambulation Assistance: (with cane PRN)  Transfer Assistance: Independent  Active : No  Patient's  Info:  does most driving  Occupation: Retired  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  [address] [place] [address] Avenue: write letters to grandchildren, stitching, audio tapes, keeping house clean, eating out (prior to [employer])  IADL Comments: Pt reports  has been performing IADLs since arm sx, but reports she is attempting to do more  Additional Comments: reports recent humerus and elbow fx ~8 wks ago s/p sx  Cognition        Objective          PROM RLE (degrees)  RLE PROM: WFL  AROM RLE (degrees)  RLE AROM: WFL  PROM LLE (degrees)  LLE PROM: WFL  AROM LLE (degrees)  LLE AROM : WFL  Strength RLE  Strength RLE: WNL  Strength LLE  Strength LLE: WNL  Tone RLE  RLE Tone: Normotonic  Tone LLE  LLE Tone: Normotonic  Motor Control  Gross Motor?: [facility] Sensation  Overall Sensation Status: WFL  Bed mobility  Rolling to Left: Independent  Rolling to Right: Independent  Supine to Sit: Independent  Sit to Supine: Independent  Transfers  Sit to Stand: Stand by assistance  Stand to sit: Stand by assistance  Ambulation  Ambulation?: Yes  More Ambulation?: No  Ambulation 1  Surface: level tile  Device: Rolling Walker  Assistance: Stand by assistance  Gait Deviations: Slow Tiara  Distance: 50'  Stairs/Curb  Stairs?: No     Balance  Posture: Fair  Sitting - Static: Good  Sitting - Dynamic: Good  Standing - Static: Good  Standing - Dynamic: Fair;+(with RW)        Plan   Plan  Times per week: DC  Safety Devices  Type of devices: Call light within reach, Gait belt, Patient at risk for falls, Left in bed, Nurse notified  Restraints  Initially in place: No      Goals  Short term goals  Time Frame for Short term goals: 1 session  Short term goal 1: Pt will demonstrate safe mobility with RW - MET  Patient Goals   Patient goals : to go home       Therapy Time   Individual Concurrent Group Co-treatment   Time In 1450         Time Out 1515         Minutes 25         Timed Code Treatment Minutes: [address], PT

## 2021-01-13 NOTE — CONSULTS
Pharmacy Note  Warfarin Consult  Dx: afib  Goal INR range 2-3   Home Warfarin dose: 5 mg Mon/Wed and 2.5 mg all other days    Date  INR  Warfarin  1/12                2.29    Checking w/ RN to see when last dose was taken. Daily INR ordered. Rx will continue to manage therapy per consult order.   Yesica Argueta, Pharm D.1/13/2021 12:32 AM

## 2021-01-13 NOTE — ED PROVIDER NOTES
I personally interviewed and examined this patient, discussed the findings, diagnostic studies, interventions and treatment plan with KURTIS. . I reviewed the clinical notes and test results. I personally supervised all pertinent procedures performed on the patient by the KURTIS throughout the course and was available to manage complications as they arose, if applicable. I agree with the assessment, management and disposition as presented by the KURTIS with exceptions/corrections as documented. Marv Wilcox is a 79 y.o. female who presents to the ED complaining of Several hour history of substernal chest pressure. Patient brought in by squad. She rates pain at a 5 out of 10. Does not radiate. Has noted no aggravating or alleviating factors. Patient non-smoker. For further details of this emergency department encounter, please see the KURTIS's documentation.       ED Triage Vitals [01/12/21 1703]   BP Temp Temp Source Pulse Resp SpO2 Height Weight   (!) 156/83 97.6 °F (36.4 °C) Oral 92 16 95 % 5' 2\" (1.575 m) 180 lb (81.6 kg)        Results for orders placed or performed during the hospital encounter of 01/12/21   Troponin   Result Value Ref Range    Troponin <0.01 <0.01 ng/mL   CBC auto differential   Result Value Ref Range    WBC 6.9 4.0 - 11.0 K/uL    RBC 4.94 4.00 - 5.20 M/uL    Hemoglobin 14.5 12.0 - 16.0 g/dL    Hematocrit 44.4 36.0 - 48.0 %    MCV 89.9 80.0 - 100.0 fL    MCH 29.3 26.0 - 34.0 pg    MCHC 32.6 31.0 - 36.0 g/dL    RDW 16.1 (H) 12.4 - 15.4 %    Platelets 189 455 - 315 K/uL    MPV 7.6 5.0 - 10.5 fL    Neutrophils % 56.9 %    Lymphocytes % 33.5 %    Monocytes % 8.5 %    Eosinophils % 0.4 %    Basophils % 0.7 %    Neutrophils Absolute 3.9 1.7 - 7.7 K/uL    Lymphocytes Absolute 2.3 1.0 - 5.1 K/uL    Monocytes Absolute 0.6 0.0 - 1.3 K/uL    Eosinophils Absolute 0.0 0.0 - 0.6 K/uL    Basophils Absolute 0.0 0.0 - 0.2 K/uL   Comprehensive metabolic panel   Result Value Ref Range Sodium 135 (L) 136 - 145 mmol/L    Potassium 3.8 3.5 - 5.1 mmol/L    Chloride 99 99 - 110 mmol/L    CO2 24 21 - 32 mmol/L    Anion Gap 12 3 - 16    Glucose 108 (H) 70 - 99 mg/dL    BUN 11 7 - 20 mg/dL    CREATININE <0.5 (L) 0.6 - 1.2 mg/dL    GFR Non-African American >60 >60    GFR African American >60 >60    Calcium 9.7 8.3 - 10.6 mg/dL    Total Protein 7.6 6.4 - 8.2 g/dL    Alb 4.3 3.4 - 5.0 g/dL    Albumin/Globulin Ratio 1.3 1.1 - 2.2    Total Bilirubin 0.3 0.0 - 1.0 mg/dL    Alkaline Phosphatase 181 (H) 40 - 129 U/L    ALT 18 10 - 40 U/L    AST 29 15 - 37 U/L    Globulin 3.3 g/dL   Protime-INR   Result Value Ref Range    Protime 26.8 (H) 10.0 - 13.2 sec    INR 2.29 (H) 0.86 - 1.14   COVID-19   Result Value Ref Range    SARS-CoV-2, NAAT Not Detected Not Detected   EKG 12 Lead   Result Value Ref Range    Ventricular Rate 89 BPM    Atrial Rate 89 BPM    P-R Interval 140 ms    QRS Duration 74 ms    Q-T Interval 364 ms    QTc Calculation (Bazett) 442 ms    P Axis 50 degrees    R Axis 43 degrees    T Axis 36 degrees    Diagnosis       Normal sinus rhythmNormal ECGWhen compared with ECG of 14-OCT-2020 12:11,No significant change was foundConfirmed by Kevin Felix ([id number]) on 1/12/2021 5:56:35 PM       CTA CHEST ABDOMEN PELVIS W CONTRAST   Final Result   Chest-      No acute vascular abnormality in the chest.  No dissection or aneurysm is   identified. Mild dependent atelectasis bilaterally. ---      Abdomen pelvis-      No acute vascular abnormality in the abdomen or pelvis. No dissection,   aneurysm or acute embolus is identified. Hepatic steatosis. No acute inflammatory abnormality is appreciated. XR CHEST PORTABLE   Final Result   No acute cardiopulmonary disease. CLINICAL IMPRESSION  1. Chest pain, unspecified type    2.  Dizziness Blood pressure 137/74, pulse 90, temperature 97.6 °F (36.4 °C), temperature source Oral, resp. rate 15, height 5' 2\" (1.575 m), weight 180 lb (81.6 kg), SpO2 95 %. Ross Hurst was admitted in stable condition. This chart was generated in part by using Dragon Dictation system and may contain errors related to that system including errors in grammar, punctuation, and spelling, as well as words and phrases that may be inappropriate. If there are any questions or concerns please feel free to contact the dictating provider for clarification.      Fabiano Granados DO  ATTENDING, [facility], DO  01/13/21 3171

## 2021-01-13 NOTE — ED NOTES
Writer updated patient's , Ghazal Manuel, at this time over the phone.      Alondra Alberto RN  01/12/21 2019

## 2021-01-13 NOTE — PROGRESS NOTES
Pt resting quietly in bed, no acute distress noted. Peripheral IV removed, site w/o s/s of complications. Tele removed. Discharge paperwork provided, pt verbalized understanding. Pt to be discharged via wheelchair to hospital at main entrance to home.

## 2021-01-13 NOTE — PROGRESS NOTES
Occupational Therapy   Occupational Therapy Initial Assessment and treatment    Date: 2021   Patient Name: Rod Grady  MRN: [id number]     : 1940    Date of Service: 2021    Discharge Recommendations:  Home with assist PRN, Outpatient OT  OT Equipment Recommendations  Other: pt reports considering a shower chair, ed on TTB    Assessment   Performance deficits / Impairments: Decreased functional mobility ; Decreased ADL status; Decreased strength;Decreased safe awareness;Decreased balance  Assessment: Pt pleasant and cooperative, admitted with chest pain. Pt is s/p R humerus/elbow fx and sx ~ 8 weeks ago with hinged brace PRN. Pt requiring assist with LB ADLs and toileting d/t weak R UE, transfers SBA/CGA for balance. Pt would benefit from skilled OT to address above occupational performance deficits. Pt reports already going to outpt therapy for her R UE. Recommend continued outpt and home with 24 hr SPV/assist.  Prognosis: Good  OT Education: OT Role;Plan of Care;Transfer Training  Patient Education: disease specific ed: transfers, safety  REQUIRES OT FOLLOW UP: Yes  Activity Tolerance  Activity Tolerance: Patient Tolerated treatment well  Safety Devices  Safety Devices in place: Yes  Type of devices: (left sitting EOB with PT)           Patient Diagnosis(es): The primary encounter diagnosis was Chest pain, unspecified type. A diagnosis of Dizziness was also pertinent to this visit. has a past medical history of Arthritis, Atrial fibrillation ([address] Colonic polyp, Paroxysmal atrial fibrillation ([address] and Vertigo. has a past surgical history that includes Tonsillectomy; Colonoscopy (2007); joint replacement (Left, 2016); and Humerus fracture surgery (Right, 10/20/2020).            Restrictions  Restrictions/Precautions  Restrictions/Precautions: Fall Risk  Position Activity Restriction  Other position/activity restrictions: high fall risk per nsg, up with assist Subjective   General  Chart Reviewed: Yes  Patient assessed for rehabilitation services?: Yes  Family / Caregiver Present: No  Referring Practitioner: IONA Levy MD  Diagnosis: chest pain  Subjective  Subjective: Pt supine, agreeable  General Comment  Comments: RN clears for therapy  Patient Currently in Pain: Denies  Vital Signs  Patient Currently in Pain: Denies  Social/Functional History  Social/Functional History  Lives With: Spouse  Type of Home: House  Home Layout: One level, Laundry in basement( has been doing laundry)  Home Access: Stairs to enter with rails  Entrance Stairs - Number of Steps: 3 LEVSIA  Bathroom Shower/Tub: Tub/Shower unit  Bathroom Toilet: Handicap height  Bathroom Equipment: (reports in process of getting grab bars)  Home Equipment: Rolling walker, Cane  ADL Assistance: Independent  Homemaking Assistance: Needs assistance  Homemaking Responsibilities: Yes( assists with laundry, cooking and pt/ share cleaning)  Ambulation Assistance: (with cane PRN)  Transfer Assistance: Independent  Active : No  Patient's  Info:  does most driving  Occupation: Retired  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, stitching, audio tapes, keeping house clean, eating out (prior to [address])  IADL Comments: Pt reports  has been performing IADLs since arm sx, but reports she is attempting to do more  Additional Comments: reports recent humerus and elbow fx ~8 wks ago s/p sx       Objective   Vision: Within Functional Limits  Hearing: Within functional limits    Orientation  Overall Orientation Status: Within Functional Limits     Balance  Sitting Balance: Supervision  Standing Balance: Supervision  Standing Balance  Time: 2-3 min  Activity: functional mobility in room and melchor  Functional Mobility  Functional - Mobility Device: Rolling Walker  Activity: Other  Assist Level: Stand by assistance

## 2021-01-13 NOTE — PROGRESS NOTES
End of shift report given to [facility]. Call light within reach, bed in lowest position, no needs at this time.

## 2021-01-13 NOTE — H&P
Hospital Medicine History & Physical      PCP: Ramon Vásquez MD    Date of Admission: 1/12/2021    Date of Service: Pt seen/examined on 1/12/2021 and Admitted to observation with expected LOS less than two midnights due to medical therapy. Chief Complaint:    Chief Complaint   Patient presents with    Chest Pain     patient started having some chest pressure today 11:30 with dizziness, no syncope today. BG 88, Hx afib     History Of Present Illness:      79 y.o. female with PMH of A. fib and obesity, who presented to [facility] with chest pain and dizziness. History was obtained from the patient and review of the EMR. The patient states that today, around 11:30 AM, she began experiencing severe dizziness and some pressure to center of her chest.  This progressively worsened throughout the day and she decided to come into the ED for further evaluation. She has been diagnosed with vertigo in the past, but is unsure of any workup that has been done for her dizziness. She does take Antivert PRN for this. .  She currently is on Coumadin for history of atrial fibrillation and her INR was therapeutic on admission. She is a non-smoker. No history of MI/CAD. She had orthostatic vitals done on the floor which did reveal some tachycardia with position changes (especially to standing).     Past Medical History:          Diagnosis Date    Arthritis     spine and left hip    Atrial fibrillation ([address]     Colonic polyp 07/31/2007    Paroxysmal atrial fibrillation ([address] 8/6/2015    Vertigo        Past Surgical History:          Procedure Laterality Date    COLONOSCOPY  07/31/2007    HUMERUS FRACTURE SURGERY Right 10/20/2020    OPEN REDUCTION INTERNAL FIXATION RIGHT DISTAL HUMERUS FRACTURE performed by Emiliano Nguyen MD at [place] [address] Left 04/2016    hip    TONSILLECTOMY         Medications Prior to Admission:      Prior to Admission medications Medication Sig Start Date End Date Taking? Authorizing Provider   vitamin D (ERGOCALCIFEROL) 1.25 MG (23582 UT) CAPS capsule Take 1 capsule by mouth once a week 10/20/20  Yes Roberto Dawkins MD   dilTIAZem (TIADYLT ER) 120 MG extended release capsule TAKE 1 CAPSULE BY MOUTH EVERY DAY 9/24/20  Yes Marceil Red APRN - CNP   warfarin (COUMADIN) 5 MG tablet Take 1 tablet by mouth daily  Patient taking differently: Take 5 mg by mouth daily 5 mg three times weekly 2.5 mg four times weekly 6/9/20  Yes Marceil Red APRN - CNP   meclizine (ANTIVERT) 25 MG tablet Take 25 mg by mouth 3 times daily as needed   Yes Historical Provider, MD   Multiple Vitamin (MULTIVITAMIN PO) Take  by mouth. Yes Historical Provider, MD   Glucosamine Sulfate 750 MG TABS Take 1 tablet by mouth daily. Historical Provider, MD   LUTEIN PO Take  by mouth. Historical Provider, MD       Allergies:  Lyrica [pregabalin]    Social History:      The patient currently lives independently. TOBACCO:   reports that she has never smoked. She has never used smokeless tobacco.  ETOH:   reports current alcohol use of about 7.0 - 10.0 standard drinks of alcohol per week. Family History:      Reviewed in detail. Positive as follows:        Problem Relation Age of Onset    Colon Cancer Mother 61    Heart Disease Brother        REVIEW OF SYSTEMS:   Pertinent positives as noted in the HPI. All other systems reviewed and negative. PHYSICAL EXAM PERFORMED:    /70   Pulse 88   Temp 97.9 °F (36.6 °C) (Oral)   Resp 22   Ht 5' 2\" (1.575 m)   Wt 186 lb 1.6 oz (84.4 kg)   SpO2 95%   BMI 34.04 kg/m²     General appearance: Pleasant female in no apparent distress, appears stated age and cooperative. HEENT:  Normal cephalic, atraumatic without obvious deformity. Pupils equal, round, and reactive to light. Extra ocular muscles intact. Conjunctivae/corneas clear. Neck: Supple, with full range of motion. No jugular venous distention. Trachea midline. Respiratory:  Normal respiratory effort. Clear to auscultation, bilaterally without Rales/Wheezes/Rhonchi. Cardiovascular:  Regular rate and rhythm with normal S1/S2 without murmurs, rubs or gallops. Abdomen: Soft, non-tender, non-distended with normal bowel sounds. Musculoskeletal:  No clubbing, cyanosis or edema bilaterally. Full range of motion without deformity. Skin: Skin color, texture, turgor normal.  No rashes or lesions. Neurologic:  Neurovascularly intact without any focal sensory/motor deficits. Cranial nerves: II-XII intact, grossly non-focal.  Psychiatric:  Alert and oriented x4, thought content appropriate, normal insight  Capillary Refill: Brisk,< 3 seconds   Peripheral Pulses: +2 palpable, equal bilaterally       Labs:     Recent Labs     01/12/21  1700   WBC 6.9   HGB 14.5   HCT 44.4        Recent Labs     01/12/21  1700   *   K 3.8   CL 99   CO2 24   BUN 11   CREATININE <0.5*   CALCIUM 9.7     Recent Labs     01/12/21  1700   AST 29   ALT 18   BILITOT 0.3   ALKPHOS 181*     Recent Labs     01/12/21  1700   INR 2.29*     Recent Labs     01/12/21  1700 01/13/21  0024   TROPONINI <0.01 <0.01       Urinalysis:    No results found for: Sarajane Jameson, BACTERIA, RBCUA, BLOODU, [address], [address]    Radiology:     CXR: I have reviewed the CXR with the following interpretation: No acute cardiopulmonary findings  EKG:  I have reviewed the EKG with the following interpretation: Normal sinus rhythm Normal ECG When compared with ECG of 14-OCT-2020 12:11, No significant change was found Confirmed by Dianping ([id number]) on 1/12/2021 5:56:35 PM    CTA CHEST ABDOMEN PELVIS W CONTRAST   Final Result   Chest-      No acute vascular abnormality in the chest.  No dissection or aneurysm is   identified. Mild dependent atelectasis bilaterally.       ---      Abdomen pelvis- No acute vascular abnormality in the abdomen or pelvis. No dissection,   aneurysm or acute embolus is identified. Hepatic steatosis. No acute inflammatory abnormality is appreciated. XR CHEST PORTABLE   Final Result   No acute cardiopulmonary disease. ASSESSMENT:    Active Hospital Problems    Diagnosis Date Noted    Chest pain [R07.9] 01/12/2021         PLAN:    Chest pain in setting of no known HX of CAD.  Serial troponin - initial negative   EKG w/o ischemic changes   FLP in am   NPO after MN   Did not yet order stress test/echo - will trend out troponin and monitor overnight for now   PRN nitro   Tele monitoring   Heart score 3    Dizziness in setting of known hx of vertigo  - On meclizine PRN - continued  - Orthostatic vitals done - revealed tachycardia with position changes, especially to stand, POTS? Hx of paroxysmal a. Fib, in NSR on admission   Continue home Cardizem and Coumadin   Pharmacy to dose Coumadin, thank you    Obesity -  With Body mass index is 34.04 kg/m². Complicating assessment and treatment. Placing patient at risk for multiple co-morbidities as well as early death and contributing to the patient's presentation. Counseled on weight loss    DVT Prophylaxis: Coumadin  Diet: Diet NPO, After Midnight  Code Status: Full Code    PT/OT Eval Status: Not ordered    Dispo - pending clinical improvement       Xiomara Park - NP    Thank you Callie Jackson MD for the opportunity to be involved in this patient's care.  If you have any questions or concerns please feel free to contact me at [phone].  -------------------------Dr. Mahi Aj-----------------

## 2021-01-13 NOTE — PROGRESS NOTES
Pharmacy Note  Warfarin Consult  Dx: afib  Goal INR range 2-3   Home Warfarin dose: 5 mg Mon/Wed and 2.5 mg all other days     Date                 INR                  Warfarin  1/12                2.29                   2.5 mg  1/13                2.61                   2.5 mg    Recommend Warfarin 2.5 mg tonight x1. Daily INR ordered. Rx will continue to manage therapy per consult order.   Shaunna Client PharmD  1/13/2021 at 9:10 AM

## 2021-01-15 ENCOUNTER — OFFICE VISIT (OUTPATIENT)
Dept: ORTHOPEDIC SURGERY | Age: 81
End: 2021-01-15

## 2021-01-15 DIAGNOSIS — S42.331D CLOSED DISPLACED OBLIQUE FRACTURE OF SHAFT OF RIGHT HUMERUS WITH ROUTINE HEALING, SUBSEQUENT ENCOUNTER: Primary | ICD-10-CM

## 2021-01-15 PROCEDURE — 99024 POSTOP FOLLOW-UP VISIT: CPT | Performed by: ORTHOPAEDIC SURGERY

## 2021-01-15 NOTE — PROGRESS NOTES
Subjective: Patient is here for 12-week post-op after ORIF of her right distal humerus. She is doing great. She has no pain except for an occasional soreness that she takes Tylenol for. She is here for further work-up. Objective: Physical exam shows her incision is healed great. She is neurovascular tact of the extremity. Her range of motion is from about 110 to 115 degrees of flexion to about 15 to 20 degrees short of full extension. Full prone of supination. Imagin views of the right humerus are obtained which shows no loss of reduction and some interval maturation of her callus. Appropriate alignment of the fracture. Assessment and plan: 6year-old female who is 12 weeks status post open reduction for fixation of right distal humerus fracture. She is doing fantastic. She has no pain and her motion is excellent for this point after surgery. I would like her to continue to work with therapy to see if he can just a little bit more flexion as that would help with some activities of daily living. Beyond that she is now to be weightbearing as tolerated but should progress lifting and weightbearing of use of this arm slowly and as tolerated to make sure we do not have a problem. I like to see her back in 3 months to check and see how she is doing.

## 2021-01-18 ENCOUNTER — HOSPITAL ENCOUNTER (OUTPATIENT)
Dept: OCCUPATIONAL THERAPY | Age: 81
Setting detail: THERAPIES SERIES
Discharge: HOME | End: 2021-01-18
Payer: MEDICARE

## 2021-01-18 PROCEDURE — 97110 THERAPEUTIC EXERCISES: CPT | Performed by: OCCUPATIONAL THERAPIST

## 2021-01-18 PROCEDURE — 97112 NEUROMUSCULAR REEDUCATION: CPT | Performed by: OCCUPATIONAL THERAPIST

## 2021-01-18 NOTE — FLOWSHEET NOTE
[address] [facility], [address] [place] [address] Sreekanth Goodwin  Phone: [phone]  Fax [phone]    Occupational Therapy Treatment Note/ Progress Report:     Is this a Progress Report:     []  Yes  [x]  No      If Yes:  Date Range for reporting period:  Beginning 10/23/20  Ending 2021  Progress report will be due (10 Rx or 30 days whichever is less):     Recertification will be due (POC Duration  / 90 days whichever is less): 20   Date:  2021  Patient Name:  Sanya Tinoco    :  1940  MRN: [id number]  Medical/Treatment Diagnosis Information:  · Diagnosis: R distal humerus ORIF R elbow stiffness M25.621   ·       Insurance/Certification information:   [facility]  Physician Information:  Referring Practitioner: Dr. Dirk Burch  Has the plan of care been signed (Y/N):        []  Yes  [x]  No   Comorbidities Affecting Functional Performance:     []Anxiety (F41.9)/Depression (F32.9)   []Diabetes Type 1(E10.65) or 2 (E11.65)   []Rheumatoid Arthritis (M05.9)  []Fibromyalgia (M79.7)  []Neuropathy(G60.9)  [x]Osteoarthritis(M19.91)  []None   [x]Other: A fib     Visit # Insurance Allowable Auth Required   10  []  Yes []  No    From 10/23/20  to 2021     Date of Injury: 10/14/20  Date of Surgery: 10/20/20     Date of Patient follow up with Physician:      RESTRICTIONS/PRECAUTIONS: 5# weight limit, non weight bearing       Latex Allergy:  [x]? No      []? Yes                    Pacemaker:  [x]? No       []? Yes      Preferred Language for Healthcare:   [x]? English       []? other:      Functional Scale:  77% (Quick DASH)                                Date assessed:  10/23/2020     C-SSRS Triggered by Intake questionnaire (Past 2 wk assessment):    No, Questionnaire did not trigger screening.      SUBJECTIVE: ready for DC      PAIN: 0/10  With use in shoulder        OBJECTIVE:   Date:  Hand Dominance: [x]?  Right    []? Left 10/23/2020    11/20/20  11/30/20  12/7/20  12/14/20  12/21/20  1/4/21  1/18/21    Objective Measures:            PAIN 5/10 4/10  3/10 3/10  3/10   0/10    Quick DASH 77%       33%                                                       Digits tip to DPFC in cm WFL           Thumb ROM MP  IP          Thumb opposition  R:  L:          Thumb Radial/Palmar abd ROM R:  L:          Wrist ROM Ext/Flex R: 50/59  L: WNL         Rad/Uln dev ROM R:  L:          Forearm ROM  Sup/pron R: NT  L: WNL         Elbow ROM Ext/flex R: 50/72  L: WNL 30/90 23/97 21/106 21/110 20/110   Shoulder Flex  Shoulder Abd  Shoulder IR/ER AAROM shoulder flexion 50 deg  NT. Shoulder flex 45 (unable to hold it there) 58 (unable to hold)    Able to go up the wall farther today with the towel   NT today secondary to vertigo   Edema in cm circumf. MCPJs R: 19.0  L: 18.0           Edema in cm circumf. Wrist R:  L:           strength in lbs R:  L:   R: 21.6   L: 36.1 R: 18# R: 24.9  R: 28.8#   Pinch Strengthin lbs: lat  R:  L:          Pinch Strength in lbs:  3 point R:  L:             MMT:             Observations:  (including splints, bandages, incisions, scars): Pt painful and nervous to move. All measurements taken with brace on.                        MODALITIES: 10/23/20  10/30/20  11/6/20  11/20/20  11/30/20  12/7/20  12/14/20  12/21/20  1/4/21  1/18/21    Fluidotherapy (13422)             Estim (26922/52712)             Paraffin (94819)             US (81593)             Iontophoresis (24690)             Hot Pack             Cold Pack  10'                        INTERVENTIONS:             Therapeutic Exercise (50843) Issued AROM, shoulder, elbow, wrist FA, and hand . Pt.  Attempted demo of each with difficulty and pain  AAROM shoulder x 5 (difficult)    AROM elbow, forearm wrist and hand x 10 each direction gentle and with cues  AAROM shoulder x 5    AAROM by therapist x 5 Unable to initiate shoulder flexion on her own      AROM elbow, forearm and hand x 15 each  Scrunching towell with fingers    Wrist cisor 5'    Yellow digiflex  X 20  AROM elbow, FA, wrist     5'    X 20  AROM elbow and FA        X 20  Same    AROM elbow and shoulder x 10  X 10 each  AROM elbow x 20     No shoulder ROM as pt. Reports she is feeling dizzy and it makes that worse. Shoulder shrugs  X 5  X 10  X 10  X 10  X 10 X 10  X 10  X 10  X 10     SS  X 10  X 10  X 10 X 10  X 10  X 10  X 10  X 10          Flex bar red wrist flex/ext x 10 (elbow tucked)     X 20 all motions    S/p with black mallet x 10 x 2  X 20 each      X 10 x 2  X 20 each    10 x 2      Flex bar x 20 each    15 x 2 1 wt Flex bar red x 20     1 wt 15 x 2               No money x 10 x 2  10 x 2     AAROM shoulder standing at finisher x 10     Up the wall x 5 (difficult)       X 10         X 10          X 10 each        X 5       X 10         X 5     Therapeutic Activity (70804)      AAROM shoulder flexion sitting in rolling chair at table x 10  X 10   Self PROM elbow and shoulder x 10            Rolling putty 3'  Rolling putty 3' Putty with mallet 5'                Manual Therapy (07765)   Retrograde mass to hand and forearm 8' 8' 8'        (IASTM, Dry Needling, manual mobilization)                          Neuromuscular Reeducation (99369) Education   Cues for exercises. Education and cues  Finisher table no resistance ladder climb and circles x 10 each   Finisher no resistance ladder climb and circles x 15 x 2  15 x 2       Cues for exercises  15 x 2  15 x 2       Cues and encouragement for shoulder flexion 15 x 2        Cues                              ADL Training (59072)   Practiced getting brace on and off.  Min A                                     HEP Training/Review See sheet(s)             See media file                          Splinting             Lcode:             Orthotic Mgmt, Subsequent Enc (76035) Orthotic Mgmt & Training (37026)                          Other:  Redness and warmth noted in volar elbow, incsion is not red. Contacted MD and he moved her appointment from next Monday to this Monday. Less redness today. Therapeutic Exercise & NMR:  [x] (96707) Provided verbal/tactile cueing for activities related to strengthening, flexibility, endurance, ROM  for improvements in scapular, scapulothoracic and UE control with self care, reaching, carrying, lifting, house/yardwork, driving/computer work. [x] (30929) Provided verbal/tactile cueing for activities related to improving balance, coordination, kinesthetic sense, posture, motor skill, proprioception  to assist with  scapular, scapulothoracic and UE control with self care, reaching, carrying, lifting, house/yardwork, driving/computer work.     Therapeutic Activities & NMR:    [] (83894 or 16677) Provided verbal/tactile cueing for activities related to improving balance, coordination, kinesthetic sense, posture, motor skill, proprioception and motor activation to allow for proper function of scapular, scapulothoracic and UE control with self care, carrying, lifting, driving/computer work    Home Exercise Program:    [x] (80611) Reviewed/Progressed HEP activities related to strengthening, flexibility, endurance, ROM of scapular, scapulothoracic and UE control with self care, reaching, carrying, lifting, house/yardwork, driving/computer work  [] (36502) Reviewed/Progressed HEP activities related to improving balance, coordination, kinesthetic sense, posture, motor skill, proprioception of scapular, scapulothoracic and UE control with self care, reaching, carrying, lifting, house/yardwork, driving/computer work      Manual Treatments:  PROM / STM / Oscillations-Mobs:  G-I, II, III, IV (PA's, Inf., Post.) [] (45564) Provided manual therapy to mobilize soft tissue/joints of cervical/CT, scapular GHJ and UE for the purpose of modulating pain, promoting relaxation,  increasing ROM, reducing/eliminating soft tissue swelling/inflammation/restriction, improving soft tissue extensibility and allowing for proper ROM for normal function with self care, reaching, carrying, lifting, house/yardwork, driving/computer work    ADL Training:  [] (52574) Provided self-care/home management training related to activities of daily living and compensatory training, and/or use of adaptive equipment      Charges:  Timed Code Treatment Minutes: 38   Total Treatment Minutes: 38   Worker's Comp: Time In/Time Out     [] EVAL (LOW) 33737 (typically 20 minutes face-to-face)    [] EVAL (MOD) 10542 (typically 30 minutes face-to-face)  [] EVAL (HIGH) 48684 (typically 45 minutes face-to-face)  [] OT Re-eval (48482)       [x] Cindi [phone]) x   2   [] NVWVC(55779)  [x] NMR (81021) x  1   [] Estim (attended) (51353)   [] Manual ([phone]) x      [] US (47110)  [] TA () x      [] Paraffin (99140)  [] ADL  ([phone]) x     [] Splint/L code:    [] Estim (unattended) [phone])  [] Fluidotherapy (00956)  [] DN 1-2 (50761)   [] DN 3+ ([phone])  [] Orthotic Mgmt, Subsequent Enc (83417)  [] Orthotic Mgmt & Training (82954)  [] Other:    ASSESSMENT:  Better tolerance for act. Good progress with elbow ROM     GOALS:  Patient stated goal: Be able to use her R arm     []? Progressing: [x]? Met: []? Not Met: []? Adjusted     Therapist goals for Patient:   Short Term Goals: To be achieved in: 2 weeks  1. Independent in HEP and progression per patient tolerance, in order to prevent re-injury. []? Progressing: [x]? Met: []? Not Met: []? Adjusted   2. Patient will have a decrease in pain to facilitate improvement in movement, function, and ADLs as indicated by Functional Deficits. []? Progressing: [x]? Met: []? Not Met: []?  Adjusted    Long Term Goals to be achieved in 12 weeks (through 12/23/20), including patient directed goals to address patient identified performance deficits:  1) Pt to be independent in graded HEP progression with a good level of effort and compliance. []? Progressing: [x]? Met: []? Not Met: []? Adjusted   2) Pt to report a score of </= 25% on the Quick DASH disability questionnaire for increased performance with carrying, moving, and handling objects. []? Progressing: [x]? Met: []? Not Met: []? Adjusted   3) Pt will demonstrate increased ROM to R elbow to 20/110 for improved independence with reaching her face and hair for grooming . [x]? Progressing: [x]? Met: []? Not Met: []? Adjusted   4) Pt will demonstrate increased strength to R  to 20# for improved independence with cooking. []? Progressing: [x]? Met: []? Not Met: []? Adjusted   5) Pt will have a decrease in pain to 0-3/10 to facilitate playing with grand kids . []? Progressing: [x]? Met: []? Not Met: []? Adjusted                 Overall Progression Towards Functional Goals/Treatment Progress Update:  [] Patient is progressing as expected towards functional goals listed. [] Progression is slowed due to complexities/impairments listed. [] Progression has been slowed due to co-morbidities.   [] Plan just implemented, too soon to assess goals progression <30 days  [] Goals require adjustment due to lack of progress  [] Patient is not progressing as expected and requires additional follow up with physician  [x] All goals are met  [] Other:     Prognosis for POC: [x] Good [] Fair  [] Poor    Patient requires continued skilled intervention: [x] Yes  [] No    Treatment/Activity Tolerance:  [x] Patient able to complete treatment  [] Patient limited by fatigue  [] Patient limited by pain    [] Patient limited by other medical complications  [] Other:                  PLAN: See eval  [x] Continue per plan of care [] Alter current plan (see comments above) [] Plan of care initiated [] Hold pending MD visit [] Discharge    Electronically signed by:  Tarik Flores OTR/L [id number]      Note: If patient does not return for scheduled/ recommended follow up visits, this note will serve as a discharge from care along with most recent update on progress.

## 2021-01-22 ENCOUNTER — HOSPITAL ENCOUNTER (OUTPATIENT)
Dept: CARDIOLOGY | Age: 81
Discharge: HOME | End: 2021-01-22
Payer: MEDICARE

## 2021-01-22 ENCOUNTER — TELEPHONE (OUTPATIENT)
Dept: CARDIOLOGY CLINIC | Age: 81
End: 2021-01-22

## 2021-01-22 DIAGNOSIS — R07.9 CHEST PAIN, UNSPECIFIED TYPE: ICD-10-CM

## 2021-01-22 DIAGNOSIS — R42 DIZZINESS: ICD-10-CM

## 2021-01-22 LAB
LV EF: 55 %
LV EF: 60 %
LVEF MODALITY: NORMAL
LVEF MODALITY: NORMAL

## 2021-01-22 PROCEDURE — 6360000002 HC RX W HCPCS: Performed by: INTERNAL MEDICINE

## 2021-01-22 PROCEDURE — 3430000000 HC RX DIAGNOSTIC RADIOPHARMACEUTICAL: Performed by: INTERNAL MEDICINE

## 2021-01-22 PROCEDURE — 93017 CV STRESS TEST TRACING ONLY: CPT

## 2021-01-22 PROCEDURE — 93306 TTE W/DOPPLER COMPLETE: CPT

## 2021-01-22 PROCEDURE — 78452 HT MUSCLE IMAGE SPECT MULT: CPT

## 2021-01-22 PROCEDURE — A9502 TC99M TETROFOSMIN: HCPCS | Performed by: INTERNAL MEDICINE

## 2021-01-22 RX ADMIN — TETROFOSMIN 11.2 MILLICURIE: 1.38 INJECTION, POWDER, LYOPHILIZED, FOR SOLUTION INTRAVENOUS at 08:35

## 2021-01-22 RX ADMIN — TETROFOSMIN 34 MILLICURIE: 1.38 INJECTION, POWDER, LYOPHILIZED, FOR SOLUTION INTRAVENOUS at 10:15

## 2021-01-22 RX ADMIN — REGADENOSON 0.4 MG: 0.08 INJECTION, SOLUTION INTRAVENOUS at 10:15

## 2021-01-22 NOTE — TELEPHONE ENCOUNTER
Let patient know echo test shows normal heart function, no new orders or changes at this time.  Thanks. Spoke with Eris Chowdary relayed echo and stress test results. Pt v/u and would like to consider a heart cath, she will think about it and let NPBB know her decision at upcoming Feb appt.

## 2021-01-22 NOTE — TELEPHONE ENCOUNTER
----- Message from Jr Solano MD sent at 1/22/2021 11:45 AM EST -----  Let patient know their stress test has mild abnormalities, I think this can be monitored for now. Alternatively, if she is not feeling well, heart cath can be considered but it may be best to defer that given recent fall and rt arm fx. If she is not feeling well, can consider ht cath, let me know how she wishes to proceed. Thanks.

## 2021-02-03 ENCOUNTER — OFFICE VISIT (OUTPATIENT)
Dept: CARDIOLOGY CLINIC | Age: 81
End: 2021-02-03
Payer: MEDICARE

## 2021-02-03 VITALS
BODY MASS INDEX: 34.6 KG/M2 | HEART RATE: 95 BPM | DIASTOLIC BLOOD PRESSURE: 68 MMHG | WEIGHT: 188 LBS | SYSTOLIC BLOOD PRESSURE: 108 MMHG | HEIGHT: 62 IN | OXYGEN SATURATION: 98 %

## 2021-02-03 DIAGNOSIS — I48.0 PAF (PAROXYSMAL ATRIAL FIBRILLATION) (HCC): Primary | ICD-10-CM

## 2021-02-03 DIAGNOSIS — R07.89 ATYPICAL CHEST PAIN: ICD-10-CM

## 2021-02-03 PROCEDURE — 93000 ELECTROCARDIOGRAM COMPLETE: CPT | Performed by: NURSE PRACTITIONER

## 2021-02-03 PROCEDURE — 99214 OFFICE O/P EST MOD 30 MIN: CPT | Performed by: NURSE PRACTITIONER

## 2021-02-03 RX ORDER — METOPROLOL SUCCINATE 25 MG/1
25 TABLET, EXTENDED RELEASE ORAL DAILY
Qty: 30 TABLET | Refills: 3 | Status: SHIPPED | OUTPATIENT
Start: 2021-02-03 | End: 2021-02-11 | Stop reason: ALTCHOICE

## 2021-02-03 NOTE — PATIENT INSTRUCTIONS
Start Metroprolol (Toprol) 25mg once a day. Stop Diltiazem to see if dizziness improves  Follow up with ENT. Drink at least four 12 ounce bottles of water a day.   Follow up with Dr. Tori Bruner to discuss angiogram.

## 2021-02-03 NOTE — PROGRESS NOTES
[facility]   Electrophysiology  Note              Date:  February 3, 2021  Patient name: Pati Frias  YOB: 1940    Primary Care physician: Ron Larry MD    HISTORY OF PRESENT ILLNESS: The patient is a 79 y.o.  female with a history of symptomatic paroxysmal atrial fibrillation and vertigo. She was diagnosed with atrial fibrillation in 8/2015 after going to the ER with chest pain. She converted to sinus rhythm spontaneously. Echo in 8/2015 showed an EF of 55-60%. She was admitted in 1/2021 with CP and dizziness and workup was negative. Most recent echo in 1/2021 showed an EF of 55%. Stress test 1/2021 showed possible inferolateral ischemia and medical management was preferred due to recent fall with fracture. Today she is being seen for paroxysmal atrial fibrillation. EKG shows sinus rhythm with a heart rate of 97. Linda Lamb has the following complaints:    -dizziness and lightheadedness usually with changing positions, usually worse in the morning   -spinning and is seeing ENT soon   -does not drink much water   -A 'little' chest pressure and is unable to give frequency that lasts 'seconds' to minutes. Denies aggravating factors and answers 'I don't know' to relieving qualities. -fatigue    Past Medical History:   has a past medical history of Arthritis, Atrial fibrillation ([address] Colonic polyp, Paroxysmal atrial fibrillation ([address] and Vertigo. Past Surgical History:   has a past surgical history that includes Tonsillectomy; Colonoscopy (07/31/2007); joint replacement (Left, 04/2016); and Humerus fracture surgery (Right, 10/20/2020). Home Medications:    Prior to Admission medications    Medication Sig Start Date End Date Taking?  Authorizing Provider   vitamin D (ERGOCALCIFEROL) 1.25 MG (20194 UT) CAPS capsule Take 1 capsule by mouth once a week 10/20/20  Yes Jennifer Pardo MD dilTIAZem (TIADYLT ER) 120 MG extended release capsule TAKE 1 CAPSULE BY MOUTH EVERY DAY 9/24/20  Yes JENNIFER Turner CNP   warfarin (COUMADIN) 5 MG tablet Take 1 tablet by mouth daily  Patient taking differently: Take 5 mg by mouth daily 5 mg three times weekly 2.5 mg four times weekly 6/9/20  Yes JENNIFER Turner CNP   meclizine (ANTIVERT) 25 MG tablet Take 25 mg by mouth 3 times daily as needed   Yes Historical Provider, MD   Glucosamine Sulfate 750 MG TABS Take 1 tablet by mouth daily. Yes Historical Provider, MD   Multiple Vitamin (MULTIVITAMIN PO) Take  by mouth. Yes Historical Provider, MD   LUTEIN PO Take  by mouth. Yes Historical Provider, MD       Allergies:  Lyrica [pregabalin]    Social History:   reports that she has never smoked. She has never used smokeless tobacco. She reports current alcohol use of about 7.0 - 10.0 standard drinks of alcohol per week. She reports that she does not use drugs. Family History: family history includes Colon Cancer (age of onset: 61) in her mother; Heart Disease in her brother. Review of Systems   All 14-point review of systems are completed and pertinent positives are mentioned in the history of present illness. Other systems are reviewed and are negative. PHYSICAL EXAM:    Vitals signs:    /68   Pulse 95   Ht 5' 2\" (1.575 m)   Wt 188 lb (85.3 kg)   SpO2 98%   BMI 34.39 kg/m²      Constitutional and general appearance: alert, cooperative, no distress and appears stated age  HEENT: PERRL, no cervical lymphadenopathy. No masses palpable.  Normal oral mucosa  Respiratory:  · Normal excursion and expansion without use of accessory muscles  · Resp auscultation: Normal breath sounds without dullness or wheezing  Cardiovascular:  · The apical impulse is not displaced  · Heart tones are crisp and normal. Regular S1 and S2.  · Jugular venous pulsation Normal · The carotid upstroke is normal in amplitude and contour without delay or bruit  · Peripheral pulses are symmetrical and full   Abdomen:  · No masses or tenderness  · Bowel sounds present  Extremities:  ·  No cyanosis or clubbing  ·  No lower extremity edema (left ankle is larger than right ankle since hip surgery)  ·  Skin: warm and dry  Neurological:  · Alert and oriented  · Moves all extremities equally  · No abnormalities of mood, affect, memory, mentation, or behavior are noted    DATA:    ECG 2/3/2021:  SR HR 97    Echo 1/22/2021:  Normal left ventricle systolic function with an estimated ejection fraction of 55%. No regional wall motion abnormalities are seen. Normal left ventricular diastolic filling pressure. MIld tricuspid regurgitation. Systolic pulmonary artery pressure (SPAP) is normal and estimated at 27 mmHg (right atrial pressure 3 mmHg). Echo 8/6/2015  Left ventricle size is normal.   Moderate concentric left ventricular hypertrophy is present.   Global ejection fraction is normal and estimated from 55% to 60%.   No regional wall motion abnormalities are noted.   Diastolic filling parameters suggests normal diastolic function .   Mild thickening of leaflets of mitral valve.   Mild mitral regurgitation is present. Mild posterior mitral annulus calcification is present. No mitral stenosis.   Aortic valve appears sclerotic but opens adequately.   No evidence of aortic valve regurgitation.   Normal right ventricular size .   Left ventricular contractility appears normal But   TAPSE values reduced    Stress test 1/22/2021:  Normal LV function.    Normal wall motion    Small area of possible inferolateral ischemia     CARDIOLOGY LABS:   CBC: No results for input(s): WBC, HGB, HCT, PLT in the last 72 hours. BMP: No results for input(s): NA, K, CO2, BUN, CREATININE, LABGLOM, GLUCOSE in the last 72 hours. PT/INR: No results for input(s): PROTIME, INR in the last 72 hours. APTT:No results for input(s): APTT in the last 72 hours. FASTING LIPID PANEL:  Lab Results   Component Value Date    HDL 43 01/13/2021    LDLCALC 82 01/13/2021    TRIG 67 01/13/2021     LIVER PROFILE:No results for input(s): AST, ALT, ALB in the last 72 hours. Assessment:   1. Symptomatic paroxysmal atrial fibrillation: stable   -diagnosed in 8/2015   -[id number] score 3 (age and gender)  2. Vertigo: on meclizine  3. OA: s/p hip replacement in 4/2017  4. Humerus fracture due to fall: s/p ORIF 10/2020  5. Chest pain: unclear if typical or atypical   6. Abnormal stress test: noted 1/2021  7. Dizziness: ongoing     Plan:   1. Continue Coumadin (Clinic managing)  2. Stop Cardizem to see if dizziness improves  3. Start Toprol 25mg po QD  4. Annual CBC (due 1/2022)  5. Discussed chest pain, abnormal stress test, and Dr. Melissa Sandoval recommendations. Patient is unsure if she would like to proceed and would like a face to face visit with MD. Recommend follow up next available. 6. Follow up with EP in one year     MDM: moderate    I have spent 30 minutes of face to face time with the patient with more than 50% spent counseling and coordinating care for chest pain and afib.      Ilya Brown, JENNIFER-SHIVA [address]  [phone]

## 2021-02-04 ENCOUNTER — ANTI-COAG VISIT (OUTPATIENT)
Dept: PHARMACY | Age: 81
End: 2021-02-04
Payer: MEDICARE

## 2021-02-04 LAB — INTERNATIONAL NORMALIZATION RATIO, POC: 1.9

## 2021-02-04 PROCEDURE — 85610 PROTHROMBIN TIME: CPT

## 2021-02-04 PROCEDURE — 99211 OFF/OP EST MAY X REQ PHY/QHP: CPT

## 2021-02-04 NOTE — PROGRESS NOTES
Ms. Michael Cabrales is here for management of anticoagulation for Afib. Patient was started on Warfarin on 8/6/15. PMH also significant for colonic polyp, and vertigo. She presents today w/out complaint. Pt verifies dosing regimen as listed above  No missed doses  Pt denies s/sx bleeding/swelling/SOB. No changes in Rx/OTC/herbal medications. Reports that she takes 1-2 Aleve here and there as needed. No tobacco use. She reports that she drinks one ETOH drink about every night. INR 1.9 is slightly below therapeutic range of 2-3. Recommend to change 5 mg dose from Mon/Wed to Mon/Fri and 2.5 mg all other days. Patient has 5 mg tablets. Will continue to monitor and check INR in 4 weeks. Dosing reminder card given with phone number, appointment date and time.    Return to clinic:  3/4 at 10:45AM  Referring MD: Dr. Syl Padron, PharmD Candidate [id number]

## 2021-02-09 ENCOUNTER — TELEPHONE (OUTPATIENT)
Dept: CARDIOLOGY CLINIC | Age: 81
End: 2021-02-09

## 2021-02-09 NOTE — TELEPHONE ENCOUNTER
Patient called stating she is no longer taking metoprolol because it causes her severe diarrhea.  Patient states she has gone back to taking IC tiadylter

## 2021-02-10 NOTE — TELEPHONE ENCOUNTER
IC tiadylter 120mg daily. Pharmacy gave her this and stated that was a different name/ of diltiazem. She read the bottle to me.

## 2021-02-11 RX ORDER — DILTIAZEM HYDROCHLORIDE 120 MG/1
120 CAPSULE, COATED, EXTENDED RELEASE ORAL DAILY
Qty: 1 CAPSULE | Refills: 0
Start: 2021-02-11 | End: 2021-02-25

## 2021-02-11 NOTE — TELEPHONE ENCOUNTER
Patient informed        IC tiadylter 120mg daily        Placed back on med list and metoprolol removed.

## 2021-02-25 RX ORDER — DILTIAZEM HYDROCHLORIDE 120 MG/1
CAPSULE, EXTENDED RELEASE ORAL
Qty: 90 CAPSULE | Refills: 3 | Status: SHIPPED | OUTPATIENT
Start: 2021-02-25 | End: 2022-02-11 | Stop reason: SDUPTHER

## 2021-02-26 ENCOUNTER — OFFICE VISIT (OUTPATIENT)
Dept: CARDIOLOGY CLINIC | Age: 81
End: 2021-02-26
Payer: MEDICARE

## 2021-02-26 VITALS
DIASTOLIC BLOOD PRESSURE: 72 MMHG | SYSTOLIC BLOOD PRESSURE: 130 MMHG | HEART RATE: 88 BPM | WEIGHT: 189 LBS | BODY MASS INDEX: 34.78 KG/M2 | HEIGHT: 62 IN | OXYGEN SATURATION: 94 %

## 2021-02-26 DIAGNOSIS — R94.39 ABNORMAL STRESS TEST: Primary | ICD-10-CM

## 2021-02-26 DIAGNOSIS — I48.0 PAF (PAROXYSMAL ATRIAL FIBRILLATION) (HCC): ICD-10-CM

## 2021-02-26 DIAGNOSIS — R42 DIZZINESS: ICD-10-CM

## 2021-02-26 PROCEDURE — 1111F DSCHRG MED/CURRENT MED MERGE: CPT | Performed by: INTERNAL MEDICINE

## 2021-02-26 PROCEDURE — 99214 OFFICE O/P EST MOD 30 MIN: CPT | Performed by: INTERNAL MEDICINE

## 2021-02-26 NOTE — PATIENT INSTRUCTIONS
1. Discussed CT calcium score however patient wishes to defer for now  2. Discussed Watchman procedure due to recent fall with injury  3. Continue to monitor symptoms  4. Continue current medications  5.  Follow up with [facility]

## 2021-02-26 NOTE — PROGRESS NOTES
[facility]   CARDIAC EVALUATION NOTE  [phone]      PCP:  Miguel Bliss MD    Reason for Consultation/Chief Complaint: follow up for abnormal stress test    Subjective   History of Present Illness:  Tico Bruce is a 79 y.o. patient with a history of AFib who presents for hospital follow up. She has a cardiac history which dates back to 2015, was diagnosed with atrial fibrillation. She has been following up in our office with our EP service. She has been on chronic anticoagulation with Coumadin, INR was therapeutic during assessment during this hospital admission. She presented to the hospital 01/12-01/13/21 with complaints of chest pain and dizziness for the last day. Her troponin levels were found to be negative. She denies shortness of breath. She denies any recent changes in her medications although she is status post a fall about 2 months ago with a right arm fracture that required surgery. She was wearing a brace for this. Her echo from 01/22/21 showed her EF was 55%. Mild TR. Her stress test from 01/22/21 showed a small area of possible inferolateral ischemia. At her OV with Rafia Sveta CNP on 02/03/21 her diltiazem was stopped and she was started on toprol 25 mg daily. Today she reports she is going to get balance therapy for vertigo. She reports only mild dizziness. She denies chest pressure, CP, SOB, fatigue or syncope. Past Medical History:   has a past medical history of Arthritis, Atrial fibrillation ([address] Colonic polyp, Paroxysmal atrial fibrillation ([address] and Vertigo. Surgical History:   has a past surgical history that includes Tonsillectomy; Colonoscopy (07/31/2007); joint replacement (Left, 04/2016); and Humerus fracture surgery (Right, 10/20/2020).      Social History: reports that she has never smoked. She has never used smokeless tobacco. She reports current alcohol use of about 7.0 - 10.0 standard drinks of alcohol per week. She reports that she does not use drugs. Family History:  family history includes Colon Cancer (age of onset: 61) in her mother; Heart Disease in her brother. Home Medications:  Were reviewed and are listed in nursing record and/or below  Prior to Admission medications    Medication Sig Start Date End Date Taking? Authorizing Provider   dilTIAZem (TIAZAC) 120 MG extended release capsule TAKE ONE CAPSULE BY MOUTH EVERY DAY 2/25/21  Yes JENNIFER Lee CNP   vitamin D (ERGOCALCIFEROL) 1.25 MG (60910 UT) CAPS capsule Take 1 capsule by mouth once a week 10/20/20  Yes Peggy Bob MD   warfarin (COUMADIN) 5 MG tablet Take 1 tablet by mouth daily  Patient taking differently: Take 5 mg by mouth daily 5 mg three times weekly 2.5 mg four times weekly 6/9/20  Yes JENNIFER Lee CNP   meclizine (ANTIVERT) 25 MG tablet Take 25 mg by mouth 3 times daily as needed   Yes Historical Provider, MD   Glucosamine Sulfate 750 MG TABS Take 1 tablet by mouth daily. Yes Historical Provider, MD   Multiple Vitamin (MULTIVITAMIN PO) Take  by mouth. Yes Historical Provider, MD   LUTEIN PO Take  by mouth. Yes Historical Provider, MD          Allergies:  Lyrica [pregabalin]     Review of Systems:   A 14 point review of symptoms completed. Pertinent positives identified in the HPI, all other review of symptoms negative as below.       Objective   PHYSICAL EXAM:    Vitals:    02/26/21 1420   BP: 130/72   Pulse: 88   SpO2: 94%    Weight: 189 lb (85.7 kg)         General Appearance:  Alert, cooperative, no distress, appears stated age   Head:  Normocephalic, without obvious abnormality, atraumatic   Eyes:  PERRL, conjunctiva/corneas clear   Nose: Nares normal, no drainage or sinus tenderness   Throat: Lips, mucosa, and tongue normal Neck: Supple, symmetrical, trachea midline, no adenopathy, thyroid: not enlarged, symmetric, no tenderness/mass/nodules, no carotid bruit or JVD   Lungs:   Clear to auscultation bilaterally, respirations unlabored   Chest Wall:  No deformity or tenderness   Heart:  Regular rate and rhythm, S1, S2 normal, no murmur, rub or gallop   Abdomen:   Soft, non-tender, bowel sounds active all four quadrants,  no masses, no organomegaly   Extremities: Extremities normal, atraumatic, no cyanosis or edema   Pulses: 2+ and symmetric   Skin: Skin color, texture, turgor normal, no rashes or lesions   Pysch: Normal mood and affect   Neurologic: Normal gross motor and sensory exam.         Labs   CBC:   Lab Results   Component Value Date    WBC 6.2 2021    RBC 4.35 2021    HGB 12.9 2021    HCT 39.0 2021    MCV 89.6 2021    RDW 16.0 2021     2021     CMP:  Lab Results   Component Value Date     2021    K 3.6 2021     2021    CO2 24 2021    BUN 12 2021    CREATININE <0.5 2021    GFRAA >60 2021    AGRATIO 1.3 2021    LABGLOM >60 2021    GLUCOSE 102 2021    PROT 7.6 2021    CALCIUM 8.8 2021    BILITOT 0.3 2021    ALKPHOS 181 2021    AST 29 2021    ALT 18 2021     PT/INR:  No results found for: PTINR  HgBA1c:No results found for: LABA1C  Lab Results   Component Value Date    TROPONINI <0.01 2021         Cardiac Data     Last EK21  SR HR 97      Echo: 21  Summary   Normal left ventricle systolic function with an estimated ejection fraction   of 55%. No regional wall motion abnormalities are seen. Normal left ventricular diastolic filling pressure. MIld tricuspid regurgitation. Systolic pulmonary artery pressure (SPAP) is normal and estimated at 27 mmHg   (right atrial pressure 3 mmHg).       Stress Test: 21 Summary Normal LV function. Normal wall motion  Small area of possible inferolateral ischemia   Overall, this would be considered an abnormal, low risk, study        Cath:    Studies:       I have reviewed labs and imaging/xray/diagnostic testing in this note. Assessment      1. Abnormal stress test    2. PAF (paroxysmal atrial fibrillation) ([address]    3. Dizziness                 Plan   1. Discussed CT calcium score however patient wishes to defer for now and we discussed different approaches to assessing a borderline abnormal stress test, this would include potential heart catheterization which does not absolutely have to be pursued at this time. Having heard options regarding medical management with continued observation versus additional noninvasive imaging such as CT calcium score and invasive strategy with catheterization, patient is elected to pursue medical management at this time. She will let us know if there is any change in her clinical status or change in symptoms and at that point we can consider different options/plans. 2. Discussed Watchman procedure due to recent fall with injury, brochure given to consider this  3. Continue to monitor symptoms  4. Continue current medications  5. Follow up with Aubrey Majano's attestation: This note was scribed in the presence of Dr. Jocelyn Lyman by Janna Barba RN      Thank you for allowing us to participate in the care of Luis Miguel Bagley. Please call me with any questions [phone]. Jocelyn Lyman MD, [facility]   Interventional Cardiologist  [facility]  [phone] [facility]  [phone] [address] [place] [address]  2/26/2021 2:56 PM    I will address the patient's cardiac risk factors and adjusted pharmacologic treatment as needed. In addition, I have reinforced the need for patient directed risk factor modification. Tobacco use was discussed with the patient and educated on the negative effects and was asked not to use. All questions and concerns were addressed to the patient/family. Alternatives to my treatment were discussed. DISCUSSION OF CARDIAC CATHETERIZATION PROCEDURES: The procedures, indications, risks and alternatives have been discussed with the patient and, as appropriate, with the patient's guardian . Risks discussed included, but are not limited to, bleeding, development of blood clots/emboli, damage to blood vessels, renal failure, malignant cardiac arrhythmias, stroke, heart attack, emergent coronary bypass surgery, death, dye allergy. The patient (and guardian as appropriate) expressed understanding of the aforementioned and wished to proceed.

## 2021-03-04 ENCOUNTER — ANTI-COAG VISIT (OUTPATIENT)
Dept: PHARMACY | Age: 81
End: 2021-03-04
Payer: MEDICARE

## 2021-03-04 LAB — INR BLD: 1.6

## 2021-03-04 NOTE — PROGRESS NOTES
Ms. Destinee Griffin is here for management of anticoagulation for Afib. Patient was started on Warfarin on 8/6/15. PMH also significant for colonic polyp, and vertigo. She presents today w/out complaint. Pt verifies dosing regimen as listed above  No missed doses  Pt denies s/sx bleeding/swelling/SOB. No changes in Rx/OTC/herbal medications. Reports that she takes 1-2 Aleve here and there as needed. No tobacco use. She reports that she drinks one ETOH drink about every night. No changes per pt, she is recovering from an arm injury last fall and is doing much better now. Fluctuations may be due to her recovery. INR 1.6 is slightly below therapeutic range of 2-3. Recommend to take 5 mg today (1 tablet) and continue 5 mg dose on Mon/Fri and 2.5 mg all other days. Patient has 5 mg tablets. Will monitor and check INR in 2 weeks. Dosing reminder card given with phone number, appointment date and time.    Return to clinic:  3/18 at 10:45AM  Referring MD: Dr. Henry Kennedy   PharmD Candidate   3/4/2021 11:08 AM    I have seen the patient and I agree with the assessment and plan created by the PharmD Candidate  Elise Low PharmD 3/4/2021 11:18 AM

## 2021-03-12 ENCOUNTER — IMMUNIZATION (OUTPATIENT)
Dept: PRIMARY CARE CLINIC | Age: 81
End: 2021-03-12
Payer: MEDICARE

## 2021-03-12 PROCEDURE — 0001A COVID-19, PFIZER VACCINE 30MCG/0.3ML DOSE: CPT | Performed by: FAMILY MEDICINE

## 2021-03-12 PROCEDURE — 91300 COVID-19, PFIZER VACCINE 30MCG/0.3ML DOSE: CPT | Performed by: FAMILY MEDICINE

## 2021-03-18 ENCOUNTER — ANTI-COAG VISIT (OUTPATIENT)
Dept: PHARMACY | Age: 81
End: 2021-03-18
Payer: MEDICARE

## 2021-03-18 LAB — INTERNATIONAL NORMALIZATION RATIO, POC: 1.8

## 2021-03-18 PROCEDURE — 99211 OFF/OP EST MAY X REQ PHY/QHP: CPT

## 2021-03-18 PROCEDURE — 85610 PROTHROMBIN TIME: CPT

## 2021-03-18 NOTE — PROGRESS NOTES
Ms. Sharyl Aase is here for management of anticoagulation for Afib. Patient was started on Warfarin on 8/6/15. PMH also significant for colonic polyp, and vertigo. She presents today w/out complaint. Pt verifies dosing regimen as listed above  No missed doses  Pt denies s/sx bleeding/swelling/SOB. No changes in Rx/OTC/herbal medications. Reports that she takes 1-2 Aleve here and there as needed. No tobacco use. She reports that she drinks one ETOH drink about every night. INR is low again- this is the third visit where her INR is subtherapeutic, will increase dose. INR 1.8 is slightly below therapeutic range of 2-3. Recommend to take 5mg today and increase dose to 5 mg dose on Mon/Wed/Fri and 2.5 mg all other days. Patient has 5 mg tablets. Will monitor and check INR in 2 weeks. Dosing reminder card given with phone number, appointment date and time.    Return to clinic:  3/30 at 10:45AM  Referring MD: Dr. Mendez Geronimo PharmD  3/18/2021 at 11:03 AM

## 2021-03-30 ENCOUNTER — ANTI-COAG VISIT (OUTPATIENT)
Dept: PHARMACY | Age: 81
End: 2021-03-30
Payer: MEDICARE

## 2021-03-30 LAB — INR BLD: 2

## 2021-03-30 PROCEDURE — 99211 OFF/OP EST MAY X REQ PHY/QHP: CPT

## 2021-03-30 PROCEDURE — 85610 PROTHROMBIN TIME: CPT

## 2021-03-30 NOTE — PROGRESS NOTES
Ms. Matthew Kerr is here for management of anticoagulation for Afib. Patient was started on Warfarin on 8/6/15. PMH also significant for colonic polyp, and vertigo. She presents today w/out complaint. Pt verifies dosing regimen as listed above  No missed doses  Pt denies s/sx bleeding/swelling/SOB. No changes in Rx/OTC/herbal medications. Reports that she takes 1-2 Aleve here and there as needed. No tobacco use. She reports that she drinks one ETOH drink about every night. No changes per pt. INR 2.0 is within therapeutic range of 2-3. Recommend to continue the 5 mg dose on Mon/Wed/Fri and 2.5 mg all other days. Patient has 5 mg tablets. Will monitor and check INR in 2 weeks. Dosing reminder card given with phone number, appointment date and time. Return to clinic:  4/29 at 10:30 AM  Referring MD: Dr. Lazara Murphy   PharmD Candidate   3/30/2021 1:58 PM    I have seen the patient and reviewed the progress note written by the PharmD Candidate. I agree with this assesment and plan.    Leigh Hardwick, PharmD 3/30/21 2:18 PM

## 2021-04-02 ENCOUNTER — IMMUNIZATION (OUTPATIENT)
Dept: PRIMARY CARE CLINIC | Age: 81
End: 2021-04-02
Payer: MEDICARE

## 2021-04-02 PROCEDURE — 91300 COVID-19, PFIZER VACCINE 30MCG/0.3ML DOSE: CPT | Performed by: FAMILY MEDICINE

## 2021-04-02 PROCEDURE — 0002A COVID-19, PFIZER VACCINE 30MCG/0.3ML DOSE: CPT | Performed by: FAMILY MEDICINE

## 2021-04-29 ENCOUNTER — ANTI-COAG VISIT (OUTPATIENT)
Dept: PHARMACY | Age: 81
End: 2021-04-29
Payer: MEDICARE

## 2021-04-29 LAB — INR BLD: 2.1

## 2021-04-29 PROCEDURE — 85610 PROTHROMBIN TIME: CPT | Performed by: FAMILY MEDICINE

## 2021-04-29 PROCEDURE — 99211 OFF/OP EST MAY X REQ PHY/QHP: CPT | Performed by: FAMILY MEDICINE

## 2021-04-29 NOTE — PROGRESS NOTES
Ms. Sharon Kruger is here for management of anticoagulation for Afib. Patient was started on Warfarin on 8/6/15. PMH also significant for colonic polyp, and vertigo. She presents today w/out complaint. Pt verifies dosing regimen as listed above  No missed doses  Pt denies s/sx bleeding/swelling/SOB. No changes in Rx/OTC/herbal medications. Reports that she takes 1-2 Aleve here and there as needed. No tobacco use. She reports that she drinks one ETOH drink about every night. No changes per pt. INR 2.1 is within therapeutic range of 2-3. Recommend to continue the 5 mg dose on Mon/Wed/Fri and 2.5 mg all other days. Patient has 5 mg tablets. Will monitor and check INR in 4 weeks. Dosing reminder card given with phone number, appointment date and time.    Return to clinic:  5/27 @ 1045 am   Referring MD: Dr. Lavanda Nageotte, PharmD 4/29/2021 10:38 AM

## 2021-05-27 ENCOUNTER — ANTI-COAG VISIT (OUTPATIENT)
Dept: PHARMACY | Age: 81
End: 2021-05-27
Payer: MEDICARE

## 2021-05-27 LAB — INR BLD: 1.5

## 2021-05-27 PROCEDURE — 99211 OFF/OP EST MAY X REQ PHY/QHP: CPT | Performed by: FAMILY MEDICINE

## 2021-05-27 PROCEDURE — 85610 PROTHROMBIN TIME: CPT | Performed by: FAMILY MEDICINE

## 2021-05-27 NOTE — PROGRESS NOTES
Ms. Thomas Valdes is here for management of anticoagulation for Afib. Patient was started on Warfarin on 8/6/15. PMH also significant for colonic polyp, and vertigo. She presents today w/out complaint. Pt verifies dosing regimen as listed above  No missed doses  Pt denies s/sx bleeding/swelling/SOB. No changes in Rx/OTC/herbal medications. Reports that she takes 1-2 Aleve here and there as needed. No tobacco use. She reports that she drinks one ETOH drink about every night. Pt reported eating less greens/vitamin K recently, reason unknown for low INR measurement today. Pt reported no other changes. INR 1.5 is below therapeutic range of 2-3. Recommend to take 5 mg today only, and increase dose to 2.5 mg Sun/Tues/Thurs and 5 mg all other days. Patient has 5 mg tablets. Will monitor and check INR in 2 weeks. Dosing reminder card given with phone number, appointment date and time.    Return to clinic:  6/10 @ 1130 am   Referring MD: Dr. Adriana Greer  PharmD Candidate 2022    I have seen the patient and I agree with the assessment and plan created by the PharmD Candidate  Sonia Ledezma PharmD 5/27/2021 11:33 AM

## 2021-06-10 ENCOUNTER — ANTI-COAG VISIT (OUTPATIENT)
Dept: PHARMACY | Age: 81
End: 2021-06-10
Payer: MEDICARE

## 2021-06-10 LAB — INR BLD: 2.4

## 2021-06-10 PROCEDURE — 99211 OFF/OP EST MAY X REQ PHY/QHP: CPT

## 2021-06-10 PROCEDURE — 85610 PROTHROMBIN TIME: CPT

## 2021-06-10 NOTE — PROGRESS NOTES
Ms. Jimmie Barney is here for management of anticoagulation for Afib. Patient was started on Warfarin on 8/6/15. PMH also significant for colonic polyp, and vertigo. She presents today w/out complaint. Pt verifies dosing regimen as listed above  No missed doses  Pt denies s/sx bleeding/swelling/SOB. No changes in Rx/OTC/herbal medications. Reports that she takes 1-2 Aleve here and there as needed. No tobacco use. She reports that she drinks one ETOH drink about every night. Pt reported eating less greens/vitamin K recently, reason unknown for low INR measurement today. Pt reported no other changes. INR 2.4 is within therapeutic range of 2-3. Recommend to continue dose of 2.5 mg Sun/Tues/Thurs and 5 mg all other days. Patient has 5 mg tablets. Will monitor and check INR in 4 weeks. Dosing reminder card given with phone number, appointment date and time.    Return to clinic:  7/8 @ 1030 am   Referring MD: Dr. Rubye Krabbe  PharmD Candidate [id number]

## 2021-07-08 ENCOUNTER — ANTI-COAG VISIT (OUTPATIENT)
Dept: PHARMACY | Age: 81
End: 2021-07-08
Payer: MEDICARE

## 2021-07-08 LAB — INR BLD: 2.5

## 2021-07-08 PROCEDURE — 85610 PROTHROMBIN TIME: CPT

## 2021-07-08 PROCEDURE — 99211 OFF/OP EST MAY X REQ PHY/QHP: CPT

## 2021-07-08 NOTE — PROGRESS NOTES
Ms. Colton Colin is here for management of anticoagulation for Afib. Patient was started on Warfarin on 8/6/15. PMH also significant for colonic polyp, and vertigo. She presents today w/out complaint. Pt verifies dosing regimen as listed above  No missed doses  Pt denies s/sx bleeding/swelling/SOB. No changes in Rx/OTC/herbal medications. Reports that she takes 1-2 Aleve here and there as needed. No tobacco use. She reports that she drinks one ETOH drink about every night. INR 2.5 is within therapeutic range of 2-3. Recommend to continue dose of 2.5 mg Sun/Tues/Thurs and 5 mg all other days. Patient has 5 mg tablets. Will monitor and check INR in 4 weeks. Dosing reminder card given with phone number, appointment date and time.    Return to clinic:  8/5 @ 10:45 am   Referring MD: Dr. Rhina Cotton

## 2021-08-05 ENCOUNTER — ANTI-COAG VISIT (OUTPATIENT)
Dept: PHARMACY | Age: 81
End: 2021-08-05
Payer: MEDICARE

## 2021-08-05 LAB — INR BLD: 3.6

## 2021-08-05 PROCEDURE — 85610 PROTHROMBIN TIME: CPT

## 2021-08-05 PROCEDURE — 99211 OFF/OP EST MAY X REQ PHY/QHP: CPT

## 2021-08-19 ENCOUNTER — ANTI-COAG VISIT (OUTPATIENT)
Dept: PHARMACY | Age: 81
End: 2021-08-19
Payer: MEDICARE

## 2021-08-19 LAB — INR BLD: 2.4

## 2021-08-19 PROCEDURE — 85610 PROTHROMBIN TIME: CPT

## 2021-08-19 PROCEDURE — 99211 OFF/OP EST MAY X REQ PHY/QHP: CPT

## 2021-08-19 NOTE — PROGRESS NOTES
Ms. Shreyas Vincent is here for management of anticoagulation for Afib. Patient was started on Warfarin on 8/6/15. PMH also significant for colonic polyp, and vertigo. She presents today w/out complaint. Pt verifies dosing regimen as listed above  No missed doses  Pt denies s/sx bleeding/swelling/SOB. No changes in Rx/OTC/herbal medications. Reports that she takes 1-2 Aleve here and there as needed. No tobacco use. She reports that she drinks one EtOH drink about every night. Pt reported no changes in diet or dosing regimen, so unsure about the reason for the above therapeutic INR. INR 2.4 is within therapeutic range of 2-3. Recommend to continue dose of 2.5 mg Sun/Tues/Thurs and 5 mg all other days. Patient has 5 mg tablets. Will monitor and check INR in 4 weeks. Dosing reminder card given with phone number, appointment date and time.    Return to clinic:  9/16 @ 10:45 am   Referring MD: Dr. Ez Vela  PharmD Student 2022 8/19/2021 10:49 AM

## 2021-09-16 ENCOUNTER — ANTI-COAG VISIT (OUTPATIENT)
Dept: PHARMACY | Age: 81
End: 2021-09-16
Payer: MEDICARE

## 2021-09-16 LAB — INR BLD: 2.9

## 2021-09-16 PROCEDURE — 99211 OFF/OP EST MAY X REQ PHY/QHP: CPT

## 2021-09-16 PROCEDURE — 85610 PROTHROMBIN TIME: CPT

## 2021-10-14 ENCOUNTER — ANTI-COAG VISIT (OUTPATIENT)
Dept: PHARMACY | Age: 81
End: 2021-10-14
Payer: MEDICARE

## 2021-10-14 LAB — INR BLD: 2.6

## 2021-10-14 PROCEDURE — 99211 OFF/OP EST MAY X REQ PHY/QHP: CPT | Performed by: INTERNAL MEDICINE

## 2021-10-14 PROCEDURE — 85610 PROTHROMBIN TIME: CPT | Performed by: INTERNAL MEDICINE

## 2021-10-14 NOTE — PROGRESS NOTES
Ms. Whit Germain is here for management of anticoagulation for Afib. Patient was started on Warfarin on 8/6/15. PMH also significant for colonic polyp, and vertigo. She presents today w/out complaint. Pt verifies dosing regimen as listed above  No missed doses  Pt denies s/sx bleeding/swelling/SOB. No changes in Rx/OTC/herbal medications. Reports that she takes 1-2 Aleve here and there as needed. No tobacco use. She reports that she drinks one EtOH drink about every night. Pt will have a dental consultation tomorrow for oral surgery. Pt will call if they have to hold warfarin. No changes per patient. INR 2.6 is within therapeutic range of 2-3. Recommend to continue dose of 2.5 mg Sun/Tues/Thurs and 5 mg all other days. Patient has 5 mg tablets. Will monitor and check INR in 4 weeks. Dosing reminder card given with phone number, appointment date and time. Return to clinic:  11/11 @ 10:45 am   Referring MD: Dr. Agatha Blanca  PharmD Candidate, 2022  10/14/2021 11:06 AM      I have seen the patient and reviewed the progress note written by the PharmD Candidate. I agree with this assessment and plan.    Laura Adam, [facility], PharmD 10/14/2021 11:44 AM

## 2021-10-29 ENCOUNTER — TELEPHONE (OUTPATIENT)
Dept: CARDIOLOGY CLINIC | Age: 81
End: 2021-10-29

## 2021-10-29 DIAGNOSIS — R42 DIZZINESS: ICD-10-CM

## 2021-10-29 DIAGNOSIS — R94.39 ABNORMAL STRESS TEST: ICD-10-CM

## 2021-10-29 DIAGNOSIS — R07.89 ATYPICAL CHEST PAIN: Primary | ICD-10-CM

## 2021-10-29 DIAGNOSIS — I48.0 PAF (PAROXYSMAL ATRIAL FIBRILLATION) (HCC): ICD-10-CM

## 2021-10-29 NOTE — TELEPHONE ENCOUNTER
[facility] requests cardiac clearance for upcoming extraction under IV sedation using Versed and Fentanyl. Will us Lidocaine with Epi as a local anesthesia. Pt is on Coumadin, please advise how long in advance pt may hold blood thinners.      Fax letter [phone]

## 2021-11-05 ENCOUNTER — HOSPITAL ENCOUNTER (OUTPATIENT)
Dept: CT IMAGING | Age: 81
Discharge: HOME | End: 2021-11-05
Payer: MEDICARE

## 2021-11-05 DIAGNOSIS — R07.89 ATYPICAL CHEST PAIN: ICD-10-CM

## 2021-11-05 DIAGNOSIS — I48.0 PAF (PAROXYSMAL ATRIAL FIBRILLATION) (HCC): ICD-10-CM

## 2021-11-05 DIAGNOSIS — R94.39 ABNORMAL STRESS TEST: ICD-10-CM

## 2021-11-05 DIAGNOSIS — R42 DIZZINESS: ICD-10-CM

## 2021-11-05 PROCEDURE — 75571 CT HRT W/O DYE W/CA TEST: CPT

## 2021-11-08 ENCOUNTER — TELEPHONE (OUTPATIENT)
Dept: CARDIOLOGY CLINIC | Age: 81
End: 2021-11-08

## 2021-11-08 NOTE — TELEPHONE ENCOUNTER
----- Message from Hewitt Litten, MD sent at 11/6/2021 11:18 AM EDT -----  Let patient know their ct calcium test is normal with regard to calcific coronary plaque. incidentally, there is a small hiatal hernia, patient should follow-up with PCP for this. Otherwise, continue current meds, no new orders or changes at this time. Thanks.

## 2021-11-08 NOTE — TELEPHONE ENCOUNTER
Created telephone encounter. Spoke with Yves Early relayed message per [facility] regarding CT. Pt verbalized understanding. Spoke with pt she would like to know if she is cleared for upcoming dental procedure and she would like directions on how to hold warfarin?

## 2021-11-08 NOTE — TELEPHONE ENCOUNTER
Spoke with pt, relayed message per [facility]. Pt v/u and relayed message back to me. She will contact the office with a good fax number if she needs a clearance letter sent. Letter placed in chart.

## 2021-11-08 NOTE — TELEPHONE ENCOUNTER
Patient may proceed with dental procedure/surgery, she would be considered at low cardiac risk for that. She should hold her Coumadin for 3 days prior to the procedure/surgery, she should resume it on the day after procedure/surgery. She should get a follow-up INR/pro time within 3 to 5 days as well as in the week after the procedure. This make sure she has clear instructions on that and has follow-up for labs. Thank you.

## 2021-11-08 NOTE — LETTER
[address] Cardiology - [address] [place] [address] [facility]  Phone: [phone]  Fax: [phone]    Nina Puckett MD      Cardiac Clearance Letter  November 8, 2021    [address]  [address] [facility] [place] [address]   1940        Tremayne Chisholm is cleared from a cardiac standpoint for upcoming dental procedure. Patient may proceed with dental procedure/surgery, she would be considered at low cardiac risk for that. She should hold her Coumadin for 3 days prior to the procedure/surgery, she should resume it on the day after procedure/surgery. She should get a follow-up INR/pro time within 3 to 5 days as well as in the week after the procedure. If you have any questions or concerns, please don't hesitate to call.     Sincerely,        Nina Puckett MD

## 2021-11-11 ENCOUNTER — ANTI-COAG VISIT (OUTPATIENT)
Dept: PHARMACY | Age: 81
End: 2021-11-11
Payer: MEDICARE

## 2021-11-11 LAB — INR BLD: 3

## 2021-11-11 PROCEDURE — 85610 PROTHROMBIN TIME: CPT

## 2021-11-11 PROCEDURE — 99211 OFF/OP EST MAY X REQ PHY/QHP: CPT

## 2021-11-11 NOTE — PROGRESS NOTES
Ms. Bg Alejandro is here for management of anticoagulation for Afib. Patient was started on Warfarin on 8/6/15. PMH also significant for colonic polyp, and vertigo. She presents today w/out complaint. Pt verifies dosing regimen as listed above  No missed doses  Pt denies s/sx bleeding/swelling/SOB. No changes in Rx/OTC/herbal medications. Reports that she takes 1-2 Aleve here and there as needed. No tobacco use. She reports that she drinks one EtOH drink about every night. Pt had dental surgery recently. INR 3.0 is within therapeutic range of 2-3. Recommend to continue dose of 2.5 mg Sun/Tues/Thurs and 5 mg all other days. Patient has 5 mg tablets. Will monitor and check INR in 4 weeks. Dosing reminder card given with phone number, appointment date and time.    Return to clinic:  12/9  @ 4594   Referring MD: Dr. Martin Forte  Cardiologist : Dr Anna Solares - fax summary too

## 2021-12-07 ENCOUNTER — ANTI-COAG VISIT (OUTPATIENT)
Dept: PHARMACY | Age: 81
End: 2021-12-07
Payer: MEDICARE

## 2021-12-07 LAB — INR BLD: 2.4

## 2021-12-07 PROCEDURE — 85610 PROTHROMBIN TIME: CPT

## 2021-12-07 PROCEDURE — 99211 OFF/OP EST MAY X REQ PHY/QHP: CPT

## 2021-12-07 NOTE — PROGRESS NOTES
Ms. France London is here for management of anticoagulation for Afib. Patient was started on Warfarin on 8/6/15. PMH also significant for colonic polyp, and vertigo. She presents today w/out complaint. Pt verifies dosing regimen as listed above  No missed doses  Pt denies s/sx bleeding/swelling/SOB. No changes in Rx/OTC/herbal medications. Reports that she takes 1-2 Aleve here and there as needed. No tobacco use. She reports that she drinks one EtOH drink about every night. Is having oral surgery this Friday, will fax today's results to [facility] as requested by patient  She was instructed to hold warfarin x3 days, starting today, (Tues-Thurs) and will resume normal dosing Friday evening. INR 2.4 is within therapeutic range of 2-3. Recommend to continue dose of 2.5 mg Sun/Tues/Thurs and 5 mg all other days. Patient has 5 mg tablets. Will monitor and check INR in 1 week (d/t surgery). Dosing reminder card given with phone number, appointment date and time.    Return to clinic: 12/13 @ [phone]   Referring MD: Dr. Clara Forte  Cardiologist : Dr Alfredo Yin f) [phone] - fax summary too    Adam Gonzalez PharmD 12/7/21  10:33 AM

## 2021-12-13 ENCOUNTER — ANTI-COAG VISIT (OUTPATIENT)
Dept: PHARMACY | Age: 81
End: 2021-12-13
Payer: MEDICARE

## 2021-12-13 LAB — INTERNATIONAL NORMALIZATION RATIO, POC: 1.6

## 2021-12-13 PROCEDURE — 85610 PROTHROMBIN TIME: CPT | Performed by: INTERNAL MEDICINE

## 2021-12-13 PROCEDURE — 99211 OFF/OP EST MAY X REQ PHY/QHP: CPT | Performed by: INTERNAL MEDICINE

## 2022-01-06 ENCOUNTER — ANTI-COAG VISIT (OUTPATIENT)
Dept: PHARMACY | Age: 82
End: 2022-01-06
Payer: MEDICARE

## 2022-01-06 LAB — INR BLD: 3.8

## 2022-01-06 PROCEDURE — 99211 OFF/OP EST MAY X REQ PHY/QHP: CPT | Performed by: FAMILY MEDICINE

## 2022-01-06 PROCEDURE — 85610 PROTHROMBIN TIME: CPT | Performed by: FAMILY MEDICINE

## 2022-01-06 NOTE — PROGRESS NOTES
Ms. Chaparrita Ho is here for management of anticoagulation for Afib. Patient was started on Warfarin on 8/6/15. PMH also significant for colonic polyp, and vertigo. She presents today w/out complaint. Pt verifies dosing regimen as listed above  No missed doses  Pt denies s/sx bleeding/swelling/SOB. No changes in Rx/OTC/herbal medications. Reports that she takes 1-2 Aleve here and there as needed. No tobacco use. She reports that she drinks one EtOH drink about every night. No changes per patient, may be eating less greens than normal    INR 3.8 is above therapeutic range of 2-3   Recommend to hold dose today, then take 2.5 mg tomorrow then continue dose of 2.5 mg Sun/Tues/Thurs and 5 mg all other days. Patient has 5 mg tablets. Will monitor and check INR in 2 weeks. Dosing reminder card given with phone number, appointment date and time.    Return to clinic: 1/20 @ 1045 am   Referring MD: Dr. Eliz Crespo  Cardiologist : Dr Aleks Ingram (f) [phone] - fax summary too      Jana Mchugh, PharmD 1/6/2022 1:04 PM

## 2022-01-20 ENCOUNTER — ANTI-COAG VISIT (OUTPATIENT)
Dept: PHARMACY | Age: 82
End: 2022-01-20
Payer: MEDICARE

## 2022-01-20 LAB — INR BLD: 2.6

## 2022-01-20 PROCEDURE — 85610 PROTHROMBIN TIME: CPT

## 2022-01-20 PROCEDURE — 99211 OFF/OP EST MAY X REQ PHY/QHP: CPT

## 2022-01-20 NOTE — PROGRESS NOTES
Ms. Masha Villarreal is here for management of anticoagulation for Afib. Patient was started on Warfarin on 8/6/15. PMH also significant for colonic polyp, and vertigo. She presents today w/out complaint. Pt verifies dosing regimen as listed above  No missed doses  Pt denies s/sx bleeding/swelling/SOB. No changes in Rx/OTC/herbal medications. Reports that she takes 1-2 Aleve here and there as needed. No tobacco use. She reports that she drinks one EtOH drink about every night. INR 2.6 is within therapeutic range of 2-3   Recommend to  2.5 mg Sun/Tues/Thurs and 5 mg all other days. Patient has 5 mg tablets. Will monitor and check INR in  weeks. Dosing reminder card given with phone number, appointment date and time.    Return to clinic: 2/17 @ 10:45 am    Referring MD: Dr. Rickey Moe  Cardiologist : Dr Aguero File (r) [phone] - fax summary too

## 2022-02-11 DIAGNOSIS — I48.0 PAF (PAROXYSMAL ATRIAL FIBRILLATION) (HCC): Primary | ICD-10-CM

## 2022-02-11 RX ORDER — DILTIAZEM HYDROCHLORIDE 120 MG/1
CAPSULE, EXTENDED RELEASE ORAL
Qty: 90 CAPSULE | Refills: 3 | Status: SHIPPED | OUTPATIENT
Start: 2022-02-11

## 2022-02-11 NOTE — TELEPHONE ENCOUNTER
Pt is going to be out of diltiazem by Sunday. She has been trying to get her pharmacy to send request but I informed the pt that we did not receive anything.  [id number] [place] [facility] [id number]   Phone:  [phone]  Fax:  [phone]

## 2022-02-17 ENCOUNTER — ANTI-COAG VISIT (OUTPATIENT)
Dept: PHARMACY | Age: 82
End: 2022-02-17
Payer: MEDICARE

## 2022-02-17 LAB — INR BLD: 2.8

## 2022-02-17 PROCEDURE — 99211 OFF/OP EST MAY X REQ PHY/QHP: CPT

## 2022-02-17 PROCEDURE — 85610 PROTHROMBIN TIME: CPT

## 2022-02-17 NOTE — PROGRESS NOTES
Ms. Lupe Toscano is here for management of anticoagulation for Afib. Patient was started on Warfarin on 8/6/15. PMH also significant for colonic polyp, and vertigo. She presents today w/out complaint. Pt verifies dosing regimen as listed above  No missed doses  Pt denies s/sx bleeding/swelling/SOB. No changes in Rx/OTC/herbal medications. Reports that she takes 1-2 Aleve here and there as needed. No tobacco use. She reports that she drinks one EtOH drink about every night. INR 2.8 is within therapeutic range of 2-3   Recommend to  2.5 mg Sun/Tues/Thurs and 5 mg all other days. Patient has 5 mg tablets. Will monitor and check INR in  weeks. Dosing reminder card given with phone number, appointment date and time.    Return to clinic: 3/17 @ 10 am    Referring MD: Dr. Loulou Berger  Cardiologist : Dr Eligio Leos f) [phone] - fax summary too

## 2022-03-07 NOTE — PROGRESS NOTES
[facility]   CARDIAC EVALUATION NOTE  [phone]      PCP:  Clifford Grover MD    Reason for Consultation/Chief Complaint: follow up 1 year    Subjective   History of Present Illness:  Diana Ramirez is a [de-identified] y.o. patient with a history of AFib who presents for hospital follow up. She has a cardiac history which dates back to 2015, was diagnosed with atrial fibrillation. She has been following up in our office with our EP service. She has been on chronic anticoagulation with Coumadin, INR was therapeutic during assessment during this hospital admission. She presented to the hospital 01/12-01/13/21 with complaints of chest pain and dizziness for the last day. Her troponin levels were found to be negative. She denies shortness of breath. She denies any recent changes in her medications although she is status post a fall about 2 months ago with a right arm fracture that required surgery. She was wearing a brace for this. Her echo from 01/22/21 showed her EF was 55%. Mild TR. Her stress test from 01/22/21 showed a small area of possible inferolateral ischemia. At her OV with Zuleyka Washington CNP on 02/03/21 her diltiazem was stopped and she was started on toprol 25 mg daily. OV 2/26/21 she reported she was going to get balance therapy for vertigo. She reported only mild dizziness. She denied chest pressure, CP, SOB, fatigue or syncope. Today she reports that she has been feeling well from a cardiac standpoint. She reports no changes since she was last seen. Denies chest pain, sob, dizziness, syncope and edema. Past Medical History:   has a past medical history of Arthritis, Atrial fibrillation ([address] Colonic polyp, Paroxysmal atrial fibrillation ([address] and Vertigo. Surgical History:   has a past surgical history that includes Tonsillectomy; Colonoscopy (07/31/2007); joint replacement (Left, 04/2016); and Humerus fracture surgery (Right, 10/20/2020).      Social History: reports that she has never smoked. She has never used smokeless tobacco. She reports current alcohol use. She reports that she does not use drugs. Family History:  family history includes Colon Cancer (age of onset: 61) in her mother; Heart Disease in her brother. Home Medications:  Were reviewed and are listed in nursing record and/or below  Prior to Admission medications    Medication Sig Start Date End Date Taking? Authorizing Provider   Acetaminophen (TYLENOL 8 HOUR PO) Take by mouth As needed   Yes Historical Provider, MD   dilTIAZem PRAJAPATI [facility]) 120 MG extended release capsule TAKE ONE CAPSULE BY MOUTH EVERY DAY 2/11/22  Yes Mary Barraza MD   warfarin (COUMADIN) 5 MG tablet Take 1 tablet by mouth daily  Patient taking differently: Take 5 mg by mouth daily 2.5mg Tues, Thurs, Sun.   5mg Mon, Wed, Fri, Sat 6/9/20  Yes JENNIFER Navas - CNP   meclizine (ANTIVERT) 25 MG tablet Take 25 mg by mouth 3 times daily as needed   Yes Historical Provider, MD   Glucosamine Sulfate 750 MG TABS Take 1 tablet by mouth daily. Yes Historical Provider, MD   Multiple Vitamin (MULTIVITAMIN PO) Take  by mouth. Yes Historical Provider, MD   LUTEIN PO Take  by mouth. Yes Historical Provider, MD          Allergies:  Lyrica [pregabalin]     Review of Systems:   A 14 point review of symptoms completed. Pertinent positives identified in the HPI, all other review of symptoms negative as below.       Objective   PHYSICAL EXAM:    Vitals:    03/09/22 1012   BP: (!) 146/80   Pulse:    SpO2:     Weight: 190 lb (86.2 kg)         General Appearance:  Alert, cooperative, no distress, appears stated age   Head:  Normocephalic, without obvious abnormality, atraumatic   Eyes:  PERRL, conjunctiva/corneas clear   Nose: Nares normal, no drainage or sinus tenderness   Throat: Lips, mucosa, and tongue normal   Neck: Supple, symmetrical, trachea midline, no adenopathy, thyroid: not enlarged, symmetric, no tenderness/mass/nodules, no carotid bruit or JVD   Lungs:   Clear to auscultation bilaterally, respirations unlabored   Chest Wall:  No deformity or tenderness   Heart:  Regular rate and rhythm, S1, S2 normal, no murmur, rub or gallop   Abdomen:   Soft, non-tender, bowel sounds active all four quadrants,  no masses, no organomegaly   Extremities: Extremities normal, atraumatic, no cyanosis or edema   Pulses: 2+ and symmetric   Skin: Skin color, texture, turgor normal, no rashes or lesions   Pysch: Normal mood and affect   Neurologic: Normal gross motor and sensory exam.         Labs   CBC:   Lab Results   Component Value Date    WBC 6.2 2021    RBC 4.35 2021    HGB 12.9 2021    HCT 39.0 2021    MCV 89.6 2021    RDW 16.0 2021     2021     CMP:  Lab Results   Component Value Date     2021    K 3.6 2021     2021    CO2 24 2021    BUN 12 2021    CREATININE <0.5 2021    GFRAA >60 2021    AGRATIO 1.3 2021    LABGLOM >60 2021    GLUCOSE 102 2021    PROT 7.6 2021    CALCIUM 8.8 2021    BILITOT 0.3 2021    ALKPHOS 181 2021    AST 29 2021    ALT 18 2021     PT/INR:  No results found for: PTINR  HgBA1c:No results found for: LABA1C  Lab Results   Component Value Date    TROPONINI <0.01 2021         Cardiac Data     Last EK21  SR HR 97      Today nsr     Echo: 21  Summary   Normal left ventricle systolic function with an estimated ejection fraction   of 55%. No regional wall motion abnormalities are seen. Normal left ventricular diastolic filling pressure. MIld tricuspid regurgitation. Systolic pulmonary artery pressure (SPAP) is normal and estimated at 27 mmHg   (right atrial pressure 3 mmHg). Stress Test: 21  Summary Normal LV function.   Normal wall motion  Small area of possible inferolateral ischemia   Overall, this would be considered an abnormal, low risk, study        Cath:    Studies:       I have reviewed labs and imaging/xray/diagnostic testing in this note. Assessment      1. PAF (paroxysmal atrial fibrillation) ([address]                 Plan   1. Recommended to have follow up testing such as echo, stress test, ct ca score, Defers to do at this time. 2. Continue taking all medications the same. No changes made today. 3. Recommend to follow up with EP Practioner  4. Follow up with as needed w/ me               Scribe's attestation: This note was scribed in the presence of Dr. Amalia Moya MD   by Denisse Walker LPN           Thank you for allowing us to participate in the care of Aliyah Howe. Please call me with any questions [phone]. Amalia Moya MD, [facility]   Interventional Cardiologist  [facility]  [phone] [facility]  [phone] [address]  3/9/2022 10:45 AM    I will address the patient's cardiac risk factors and adjusted pharmacologic treatment as needed. In addition, I have reinforced the need for patient directed risk factor modification. Tobacco use was discussed with the patient and educated on the negative effects and was asked not to use. All questions and concerns were addressed to the patient/family. Alternatives to my treatment were discussed. I, Dr Amalia Moya, personally performed the services described in this documentation, as scribed by the above signed scribe in my presence. It is both accurate and complete to my knowledge. I agree with the details independently gathered by the clinical support staff and the scribed note accurately describes my personal service to the patient.

## 2022-03-09 ENCOUNTER — OFFICE VISIT (OUTPATIENT)
Dept: CARDIOLOGY CLINIC | Age: 82
End: 2022-03-09
Payer: MEDICARE

## 2022-03-09 VITALS
HEIGHT: 62 IN | HEART RATE: 87 BPM | SYSTOLIC BLOOD PRESSURE: 146 MMHG | WEIGHT: 190 LBS | DIASTOLIC BLOOD PRESSURE: 80 MMHG | BODY MASS INDEX: 34.96 KG/M2 | OXYGEN SATURATION: 97 %

## 2022-03-09 DIAGNOSIS — I48.0 PAF (PAROXYSMAL ATRIAL FIBRILLATION) (HCC): Primary | ICD-10-CM

## 2022-03-09 PROCEDURE — 99214 OFFICE O/P EST MOD 30 MIN: CPT | Performed by: INTERNAL MEDICINE

## 2022-03-09 PROCEDURE — 93000 ELECTROCARDIOGRAM COMPLETE: CPT | Performed by: INTERNAL MEDICINE

## 2022-03-09 NOTE — PATIENT INSTRUCTIONS
Plan   1. Recommended to have follow up testing, Defers to do at this time. 2. Continue taking all medications the same. No changes made today. 3. Recommend to follow up with EP Practioner  4.  Follow up with as needed

## 2022-03-17 ENCOUNTER — ANTI-COAG VISIT (OUTPATIENT)
Dept: PHARMACY | Age: 82
End: 2022-03-17
Payer: MEDICARE

## 2022-03-17 LAB — INR BLD: 2.3

## 2022-03-17 PROCEDURE — 99211 OFF/OP EST MAY X REQ PHY/QHP: CPT

## 2022-03-17 PROCEDURE — 85610 PROTHROMBIN TIME: CPT

## 2022-03-17 NOTE — PROGRESS NOTES
Ms. Freida Fuentes is here for management of anticoagulation for Afib. Patient was started on Warfarin on 8/6/15. PMH also significant for colonic polyp, and vertigo. She presents today w/out complaint. Pt verifies dosing regimen as listed above  No missed doses  Pt denies s/sx bleeding/swelling/SOB. No changes in Rx/OTC/herbal medications. Reports that she takes 1-2 Aleve here and there as needed. No tobacco use. She reports that she drinks one EtOH drink about every night. INR 2.3 is within therapeutic range of 2-3   Recommend to continue 2.5 mg Sun/Tues/Thurs and 5 mg all other days. Patient has 5 mg tablets. Will monitor and check INR in 4 weeks. Dosing reminder card given with phone number, appointment date and time.    Return to clinic: 4/14 @ 10:45 am    Referring MD: Dr. Adolph Lowry  Cardiologist : Dr Loulou Cabello (h) [phone] - fax summary too    Augusta Prado   PharmD Candidate, 2022  3/17/2022 at 10:33 AM

## 2022-04-14 ENCOUNTER — ANTI-COAG VISIT (OUTPATIENT)
Dept: PHARMACY | Age: 82
End: 2022-04-14
Payer: MEDICARE

## 2022-04-14 LAB — INR BLD: 2.5

## 2022-04-14 PROCEDURE — 99211 OFF/OP EST MAY X REQ PHY/QHP: CPT

## 2022-04-14 PROCEDURE — 85610 PROTHROMBIN TIME: CPT

## 2022-04-14 NOTE — PROGRESS NOTES
Ms. Edu Portillo is here for management of anticoagulation for Afib. Patient was started on Warfarin on 8/6/15. PMH also significant for colonic polyp, and vertigo. She presents today w/out complaint. Pt verifies dosing regimen as listed above  No missed doses  Pt denies s/sx bleeding/swelling/SOB. No changes in Rx/OTC/herbal medications. Reports that she takes 1-2 Aleve here and there as needed. No tobacco use. She reports that she drinks one EtOH drink about every night. INR 2.5 is within therapeutic range of 2-3   Recommend to continue 2.5 mg Sun/Tues/Thurs and 5 mg all other days. Patient has 5 mg tablets. Will monitor and check INR in 4 weeks. Dosing reminder card given with phone number, appointment date and time.    Return to clinic: 5/12 @ 10:45 am    Referring MD: Dr. Eligio Byers  Cardiologist : Dr Evgeny Chamorro (f) [phone] - fax summary too

## 2022-04-27 ENCOUNTER — TELEPHONE (OUTPATIENT)
Dept: CARDIOLOGY CLINIC | Age: 82
End: 2022-04-27

## 2022-04-27 NOTE — TELEPHONE ENCOUNTER
Let patient know reviewed labs through care everywhere, lipid test is a little bit high, similar to prior values, can consider a statin, and if she is interested in that, can add pravastatin 10 mg p.o. nightly and get follow-up lipids/LFTs in 1 to 2 months. Thanks.

## 2022-04-28 NOTE — TELEPHONE ENCOUNTER
Patient informed and VU. she would like to try to watch her diet and exercise first. If this is ok with you. Would you like her to still have labs done in 1-2 months?

## 2022-04-28 NOTE — TELEPHONE ENCOUNTER
That would be okay by me to try diet and exercise, would repeat numbers in 6 months rather than 1 to 2 months. Thank you.

## 2022-05-17 ENCOUNTER — ANTI-COAG VISIT (OUTPATIENT)
Dept: PHARMACY | Age: 82
End: 2022-05-17
Payer: MEDICARE

## 2022-05-17 LAB — INR BLD: 1.7

## 2022-05-17 PROCEDURE — 85610 PROTHROMBIN TIME: CPT

## 2022-05-17 PROCEDURE — 99211 OFF/OP EST MAY X REQ PHY/QHP: CPT

## 2022-05-17 NOTE — PROGRESS NOTES
Ms. Gus Ho is here for management of anticoagulation for Afib. Patient was started on Warfarin on 8/6/15. PMH also significant for colonic polyp, and vertigo. She presents today w/out complaint. Pt verifies dosing regimen as listed above  No missed doses  Pt denies s/sx bleeding/swelling/SOB. No changes in Rx/OTC/herbal medications. Reports that she takes 1-2 Aleve here and there as needed. No tobacco use. She reports that she drinks one EtOH drink about every night. Not sure why INR is low today, denies missed doses or changes in diet/meds. INR 1.7 is below therapeutic range of 2-3   Recommend to take 5mg today only, then continue 2.5 mg Sun/Tues/Thurs and 5 mg all other days. Patient has 5 mg tablets. Will monitor and check INR in 2 weeks. Dosing reminder card given with phone number, appointment date and time.    Return to clinic: 6/2 @ 10:30 am    Referring MD: Dr. Brenda Sandoval  Cardiologist : Dr Chris Montes (j) [phone] - fax summary too    Monroe Storm, PharmD 5/17/22  10:48 AM

## 2022-06-02 ENCOUNTER — ANTI-COAG VISIT (OUTPATIENT)
Dept: PHARMACY | Age: 82
End: 2022-06-02
Payer: MEDICARE

## 2022-06-02 LAB — INR BLD: 2

## 2022-06-02 PROCEDURE — 85610 PROTHROMBIN TIME: CPT

## 2022-06-02 PROCEDURE — 99211 OFF/OP EST MAY X REQ PHY/QHP: CPT

## 2022-06-02 NOTE — PROGRESS NOTES
Ms. Tavon Dotson is here for management of anticoagulation for Afib. Patient was started on Warfarin on 8/6/15. PMH also significant for colonic polyp, and vertigo. She presents today w/out complaint. Pt verifies dosing regimen as listed above  No missed doses  Pt denies s/sx bleeding/swelling/SOB. No changes in Rx/OTC/herbal medications. Reports that she takes 1-2 Aleve here and there as needed. No tobacco use. She reports that she drinks one EtOH drink about every night. INR 2.0 is within therapeutic range of 2-3   Recommend to continue 2.5 mg Sun/Tues/Thurs and 5 mg all other days. Patient has 5 mg tablets. Will monitor and check INR in 4 weeks. Dosing reminder card given with phone number, appointment date and time.    Return to clinic: 7/7  @ 10:30 am    Referring MD: Dr. Phil Landaverde  Cardiologist : Dr Bryanna Farmer (s) [phone] - fax summary too

## 2022-07-12 ENCOUNTER — ANTI-COAG VISIT (OUTPATIENT)
Dept: PHARMACY | Age: 82
End: 2022-07-12
Payer: MEDICARE

## 2022-07-12 LAB — INR BLD: 3.1

## 2022-07-12 PROCEDURE — 99211 OFF/OP EST MAY X REQ PHY/QHP: CPT

## 2022-07-12 PROCEDURE — 85610 PROTHROMBIN TIME: CPT

## 2022-07-12 NOTE — PROGRESS NOTES
Ms. Iline Najjar is here for management of anticoagulation for Afib. Patient was started on Warfarin on 8/6/15. PMH also significant for colonic polyp, and vertigo. She presents today w/out complaint. Pt verifies dosing regimen as listed above  No missed doses  Pt denies s/sx bleeding/swelling/SOB. No changes in Rx/OTC/herbal medications. Reports that she takes 1-2 Aleve here and there as needed. No tobacco use. She reports that she drinks one EtOH drink about every night. Patient reports no changes since last visit. Hasn't had as many greens as she would like to. Says she will have a serving or two the next couple days. INR 3.1 is slightly above therapeutic range of 2-3   Recommend to continue 2.5 mg Sun/Tues/Thurs and 5 mg all other days. Patient has 5 mg tablets. Will monitor and check INR in 4 weeks. Dosing reminder card given with phone number, appointment date and time.    Return to clinic: 8/11  @ 10:30 am    Referring MD: Dr. Eli Sos  Cardiologist : Dr Jennifer Guevara (m) [phone] - fax summary too    Sandy Garcia, [place] 7/12/22  10:49 AM

## 2022-08-11 ENCOUNTER — ANTI-COAG VISIT (OUTPATIENT)
Dept: PHARMACY | Age: 82
End: 2022-08-11
Payer: MEDICARE

## 2022-08-11 LAB — INR BLD: 3

## 2022-08-11 PROCEDURE — 99211 OFF/OP EST MAY X REQ PHY/QHP: CPT

## 2022-08-11 PROCEDURE — 85610 PROTHROMBIN TIME: CPT

## 2022-08-11 NOTE — PROGRESS NOTES
Ms. Mendoza Number is here for management of anticoagulation for Afib. Patient was started on Warfarin on 8/6/15. PMH also significant for colonic polyp, and vertigo. She presents today w/out complaint. Pt verifies dosing regimen as listed above  No missed doses  Pt denies s/sx bleeding/swelling/SOB. No changes in Rx/OTC/herbal medications. Reports that she takes 1-2 Aleve here and there as needed. No tobacco use. She reports that she drinks one EtOH drink about every night. Patient reports no changes since last visit. INR 3.0 is within therapeutic range of 2-3   Recommend to continue 2.5 mg Sun/Tues/Thurs and 5 mg all other days. Patient has 5 mg tablets. Will monitor and check INR in 4 weeks. Dosing reminder card given with phone number, appointment date and time.    Return to clinic: 09/08  @ 10:45 am    Referring MD: Dr. Sandee Alvarez  Cardiologist : Dr Migdalia Tripp (c) [phone] - fax summary too    Robert Huber, PharmD Candidate

## 2022-09-08 ENCOUNTER — ANTI-COAG VISIT (OUTPATIENT)
Dept: PHARMACY | Age: 82
End: 2022-09-08
Payer: MEDICARE

## 2022-09-08 LAB — INR BLD: 3.7

## 2022-09-08 PROCEDURE — 99211 OFF/OP EST MAY X REQ PHY/QHP: CPT | Performed by: FAMILY MEDICINE

## 2022-09-08 PROCEDURE — 85610 PROTHROMBIN TIME: CPT | Performed by: FAMILY MEDICINE

## 2022-09-08 NOTE — PROGRESS NOTES
Ms. Argelia Nava is here for management of anticoagulation for Afib. Patient was started on Warfarin on 8/6/15. PMH also significant for colonic polyp, and vertigo. She presents today w/out complaint. Pt verifies dosing regimen. No missed doses  Pt denies s/sx bleeding/swelling/SOB. No changes in Rx/OTC/herbal medications. Reports that she takes 1-2 Aleve here and there as needed. No tobacco use. She reports that she drinks one EtOH drink about every night. Patient reports no changes since last visit. Patient states she plans to eat more greens than usual.     INR 3.7 is above therapeutic range of 2-3   Recommend to hold dose today (9/8), then continue dose of 2.5 mg Sun/Tues/Thurs and 5 mg all other days. Patient has 5 mg tablets. Will monitor and check INR in 2 weeks. Dosing reminder card given with phone number, appointment date and time.    Return to clinic: 09/22  @ 10:45 am    Referring MD: Dr. Una Freedman  Cardiologist : Dr Farias [facility] (o) [phone] - fax summary too    Nirmala Thomason, PharmD Candidate    I have seen the patient and I agree with the assessment and plan created by the PharmD Candidate  Francy Hardwick PharmD 9/8/2022 11:08 AM

## 2022-09-22 ENCOUNTER — ANTI-COAG VISIT (OUTPATIENT)
Dept: PHARMACY | Age: 82
End: 2022-09-22
Payer: MEDICARE

## 2022-09-22 LAB — INR BLD: 2.8

## 2022-09-22 PROCEDURE — 85610 PROTHROMBIN TIME: CPT

## 2022-09-22 PROCEDURE — 99211 OFF/OP EST MAY X REQ PHY/QHP: CPT

## 2022-09-22 NOTE — PROGRESS NOTES
Ms. Whit Germain is here for management of anticoagulation for Afib. Patient was started on Warfarin on 8/6/15. PMH also significant for colonic polyp, and vertigo. She presents today w/out complaint. Pt verifies dosing regimen. No missed doses. Pt denies s/sx bleeding/swelling/SOB. No changes in Rx/OTC/herbal medications. Reports that she takes 1-2 Aleve here and there as needed. No tobacco use. She reports that she drinks one EtOH drink about every night. Patient reports no changes since last visit. INR 2.8 is within therapeutic range of 2-3   Recommend to continue dose of 2.5 mg Sun/Tues/Thurs and 5 mg all other days. Patient has 5 mg tablets. Will monitor and check INR in 4 weeks. Dosing reminder card given with phone number, appointment date and time.    Return to clinic: 10/20 @ 10:45 AM  Referring MD: Dr. Amparo Jones  Cardiologist : Dr Danielle Doss (w) [phone] - fax summary too    Rose Cook, PharmD Candidate

## 2022-10-20 ENCOUNTER — ANTI-COAG VISIT (OUTPATIENT)
Dept: PHARMACY | Age: 82
End: 2022-10-20
Payer: MEDICARE

## 2022-10-20 LAB — INR BLD: 3.2

## 2022-10-20 PROCEDURE — 85610 PROTHROMBIN TIME: CPT | Performed by: FAMILY MEDICINE

## 2022-10-20 PROCEDURE — 99211 OFF/OP EST MAY X REQ PHY/QHP: CPT | Performed by: FAMILY MEDICINE

## 2022-10-20 NOTE — PROGRESS NOTES
Ms. Gabby Ratliff is here for management of anticoagulation for Afib. Patient was started on Warfarin on 8/6/15. PMH also significant for colonic polyp, and vertigo. She presents today w/out complaint. Pt verifies dosing regimen. No missed doses. Pt denies s/sx bleeding/swelling/SOB. No changes in Rx/OTC/herbal medications. Reports that she takes 1-2 Aleve here and there as needed. No tobacco use. She reports that she drinks one EtOH drink about every night. Patient reports no changes since last visit. She will try to increase her vitamin K intake. If INR still high next visit, would recommend decreasing dose     INR 3.2 is slightly above therapeutic range of 2-3   Recommend to hold dose today, then continue dose of 2.5 mg Sun/Tues/Thurs and 5 mg all other days. Patient has 5 mg tablets. Will monitor and check INR in 2 weeks. Dosing reminder card given with phone number, appointment date and time.    Return to clinic: 11/3 @ 10:45 AM  Referring MD: Dr. Aguilar [facility]  Cardiologist : Dr Laura London (c) [phone] - fax summary too    Laila Lebron, PharmD 10/20/2022 10:53 AM

## 2022-11-03 ENCOUNTER — ANTI-COAG VISIT (OUTPATIENT)
Dept: PHARMACY | Age: 82
End: 2022-11-03
Payer: MEDICARE

## 2022-11-03 LAB — INTERNATIONAL NORMALIZATION RATIO, POC: 2.9

## 2022-11-03 PROCEDURE — 99211 OFF/OP EST MAY X REQ PHY/QHP: CPT

## 2022-11-03 PROCEDURE — 85610 PROTHROMBIN TIME: CPT

## 2022-11-03 NOTE — PROGRESS NOTES
Ms. Teena Mccray is here for management of anticoagulation for Afib. Patient was started on Warfarin on 8/6/15. PMH also significant for colonic polyp, and vertigo. She presents today w/out complaint. Pt verifies dosing regimen. No missed doses. Pt denies s/sx bleeding/swelling/SOB. No changes in Rx/OTC/herbal medications. Reports that she takes 1-2 Aleve here and there as needed. No tobacco use. She reports that she drinks one EtOH drink about every night. Patient reports no changes since last visit. Patient increased her Vit K intake and INR is now back in range    INR 2.9 is within the therapeutic range of 2-3   Recommend  to continue dose of 2.5 mg Sun/Tues/Thurs and 5 mg all other days. Patient has 5 mg tablets. Will monitor and check INR in 4 weeks. Dosing reminder card given with phone number, appointment date and time.    Return to clinic: 12/1 @ 10:30 AM  Referring MD: Dr. Los Romo  Cardiologist : Dr Aj Ewing (h) [phone] - fax summary too    Warner Choi, PharmD  11/3/2022 at 11:04 AM

## 2022-12-01 ENCOUNTER — ANTI-COAG VISIT (OUTPATIENT)
Dept: PHARMACY | Age: 82
End: 2022-12-01

## 2022-12-01 DIAGNOSIS — I48.0 PAF (PAROXYSMAL ATRIAL FIBRILLATION) (HCC): Primary | ICD-10-CM

## 2022-12-01 LAB — INR BLD: 2.8

## 2022-12-01 NOTE — PROGRESS NOTES
Ms. Chris Garcia is here for management of anticoagulation for Afib. Patient was started on Warfarin on 8/6/15. PMH also significant for colonic polyp, and vertigo. She presents today w/out complaint. Pt verifies dosing regimen. No missed doses. Pt denies s/sx bleeding/swelling/SOB. No changes in Rx/OTC/herbal medications. Reports that she takes 1-2 Aleve here and there as needed. No tobacco use. She reports that she drinks one EtOH drink about every night. Patient reports no changes since last visit. INR 2.8 is within the therapeutic range of 2-3   Recommend  to continue dose of 2.5 mg Sun/Tues/Thurs and 5 mg all other days. Patient has 5 mg tablets. Will monitor and check INR in 4 weeks. Dosing reminder card given with phone number, appointment date and time.    Return to clinic: 1/3  @ 10:30 AM  Referring MD: Dr. Jamie Parikh  Cardiologist : Dr Ceballos Mail (e) [phone] - fax summary too

## 2022-12-27 NOTE — PROGRESS NOTES
[facility]   Electrophysiology Outpatient Note              Date:  January 6, 2023  Patient name: Richard Lillian  YOB: 1940    Primary Care physician: Madison Ha MD    HISTORY OF PRESENT ILLNESS: The patient is a 81 y.o.  female with a history of symptomatic PAF and vertigo. 015, patient was diagnosed with atrial fibrillation after being evaluated in the emergency department with complaints of chest pain. She spontaneously converted to sinus rhythm. Echo showed an EF of 55 to 60%. In 1/2021, patient was admitted with chest pain and dizziness. EF of 55%. Stress test showed possible inferolateral ischemia and medical management was recommended given recent fall and fractures. In 11/2021, CT calcium score was normal but did feel a small hiatal hernia. Follow-up with PCP was recommended. Today she is being seen for AF. EKG shows SR with a HR of 89. Patient is feeling well. 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 She reports that dizziness has improved. She denies palpitations, shortness of breath or dizziness    Past Medical History:   has a past medical history of Arthritis, Atrial fibrillation ([address] Colonic polyp, Paroxysmal atrial fibrillation ([address] and Vertigo. Past Surgical History:   has a past surgical history that includes Tonsillectomy; Colonoscopy (07/31/2007); joint replacement (Left, 04/2016); and Humerus fracture surgery (Right, 10/20/2020). Home Medications:    Prior to Admission medications    Medication Sig Start Date End Date Taking?  Authorizing Provider   Acetaminophen (TYLENOL 8 HOUR PO) Take by mouth As needed   Yes Historical Provider, MD   dilTIAZedez PRAJAPATI [facility]) 120 MG extended release capsule TAKE ONE CAPSULE BY MOUTH EVERY DAY 2/11/22  Yes Adalid Ramos MD   warfarin (COUMADIN) 5 MG tablet Take 1 tablet by mouth daily  Patient taking differently: Take 5 mg by mouth daily 2.5mg Tues, Thurs, Sun.   5mg Mon, Wed, Fri, Sat 6/9/20  Yes JENNIFER Jones CNP   meclizine (ANTIVERT) 25 MG tablet Take 25 mg by mouth 3 times daily as needed   Yes Historical Provider, MD   Glucosamine Sulfate 750 MG TABS Take 1 tablet by mouth daily. Yes Historical Provider, MD   Multiple Vitamin (MULTIVITAMIN PO) Take  by mouth. Yes Historical Provider, MD   LUTEIN PO Take  by mouth. Yes Historical Provider, MD       Allergies:  Lyrica [pregabalin]    Social History:   reports that she has never smoked. She has never used smokeless tobacco. She reports current alcohol use. She reports that she does not use drugs. Family History: family history includes Colon Cancer (age of onset: 61) in her mother; Heart Disease in her brother. All 14 point review of systems are completed and pertinent positives are mentioned in the history of present illness. Other systems are reviewed and are negative. PHYSICAL EXAM:    Vital signs:    /86   Pulse (!) 102   Ht 5' 3\" (1.6 m)   Wt 195 lb (88.5 kg)   SpO2 96%   BMI 34.54 kg/m²      Constitutional and general appearance: alert, cooperative, no distress, and appears stated age  HEENT: PERRL, no cervical lymphadenopathy. No masses palpable. Normal oral mucosa  Respiratory:  Normal excursion and expansion without use of accessory muscles  Resp auscultation: Normal breath sounds without wheezing, rhonchi, and rales  Cardiovascular:   The apical impulse is not displaced  Heart tones are crisp and normal. regular S1 and S2.  Jugular venous pulsation Normal  The carotid upstroke is normal in amplitude and contour without delay or bruit  Peripheral pulses are symmetrical and full   Abdomen:  No masses or tenderness  Bowel sounds present  Extremities:   No cyanosis or clubbing   No lower extremity edema   Skin: warm and dry  Neurological:  Alert and oriented  Moves all extremities well  No abnormalities of mood, affect, memory, mentation, or behavior are noted    DATA:      Echo 1/22/2021:  Normal left ventricle systolic function with an estimated ejection fraction of 55%. No regional wall motion abnormalities are seen. Normal left ventricular diastolic filling pressure. MIld tricuspid regurgitation. Systolic pulmonary artery pressure (SPAP) is normal and estimated at 27 mmHg (right atrial pressure 3 mmHg). Echo 8/6/2015  Left ventricle size is normal.   Moderate concentric left ventricular hypertrophy is present. Global ejection fraction is normal and estimated from 55% to 60%. No regional wall motion abnormalities are noted. Diastolic filling parameters suggests normal diastolic function . Mild thickening of leaflets of mitral valve. Mild mitral regurgitation is present. Mild posterior mitral annulus calcification is present. No mitral stenosis. Aortic valve appears sclerotic but opens adequately. No evidence of aortic valve regurgitation. Normal right ventricular size . Left ventricular contractility appears normal But   TAPSE values reduced     Stress test 1/22/2021:  Normal LV function. Normal wall motion    Small area of possible inferolateral ischemia            All labs and testing reviewed. CARDIOLOGY LABS:   CBC: No results for input(s): WBC, HGB, HCT, PLT in the last 72 hours. BMP: No results for input(s): NA, K, CO2, BUN, CREATININE, LABGLOM, GLUCOSE in the last 72 hours. PT/INR: No results for input(s): PROTIME, INR in the last 72 hours. APTT:No results for input(s): APTT in the last 72 hours. FASTING LIPID PANEL:  Lab Results   Component Value Date/Time    HDL 43 01/13/2021 12:24 AM    LDLCALC 82 01/13/2021 12:24 AM    TRIG 67 01/13/2021 12:24 AM     LIVER PROFILE:No results for input(s): AST, ALT, ALB in the last 72 hours.     IMPRESSION:    Patient Active Problem List   Diagnosis    PAF (paroxysmal atrial fibrillation) (HCC)    Acidosis    Closed displaced oblique fracture of shaft of right humerus    H/O fracture of humerus    Atypical chest pain    Abnormal stress test    Dizziness       Assessment:   Symptomatic paroxysmal atrial fibrillation:   -[id number] score 3 (age, gender)   Abnormal stress test 1/2021   -normal CT calcium score 11/2021  Vertigo  Osteoarthritis   Dizziness       Plan:   1. Continue Coumadin and Cardizem  2. Annual labs due 4/20223 (Care Everywhere)  3. Monitor BP at home and call if consistently out of goal ranges  4.  Follow up in one year or sooner if needed     ROB Maurer APRN-CNP  [employer]  [phone]

## 2023-01-03 ENCOUNTER — ANTI-COAG VISIT (OUTPATIENT)
Dept: PHARMACY | Age: 83
End: 2023-01-03
Payer: MEDICARE

## 2023-01-03 DIAGNOSIS — I48.0 PAF (PAROXYSMAL ATRIAL FIBRILLATION) (HCC): Primary | ICD-10-CM

## 2023-01-03 LAB — INR BLD: 2.9

## 2023-01-03 PROCEDURE — 99211 OFF/OP EST MAY X REQ PHY/QHP: CPT

## 2023-01-03 PROCEDURE — 85610 PROTHROMBIN TIME: CPT

## 2023-01-03 NOTE — PROGRESS NOTES
Ms. Kelly Trimble is here for management of anticoagulation for Afib. Patient was started on Warfarin on 8/6/15. PMH also significant for colonic polyp, and vertigo. She presents today w/out complaint. Pt verifies dosing regimen. No missed doses. Pt denies s/sx bleeding/swelling/SOB. No changes in Rx/OTC/herbal medications. Reports that she takes 1-2 Aleve here and there as needed. No tobacco use. She reports that she drinks one EtOH drink about every night. Patient reports no changes since last visit. INR 2.9 is within the therapeutic range of 2-3   Recommend to continue dose of 2.5 mg Sun/Tues/Thurs and 5 mg all other days. Patient has 5 mg tablets. Will monitor and check INR in 4 weeks. Dosing reminder card given with phone number, appointment date and time.    Return to clinic: 2/2  @ 10:30 AM  Referring MD: Dr. Sandee Alvarez  Cardiologist : Dr Migdalia Tripp ([phone]) [phone] - fax summary too

## 2023-01-06 ENCOUNTER — OFFICE VISIT (OUTPATIENT)
Dept: CARDIOLOGY CLINIC | Age: 83
End: 2023-01-06
Payer: MEDICARE

## 2023-01-06 VITALS
OXYGEN SATURATION: 96 % | HEIGHT: 63 IN | BODY MASS INDEX: 34.55 KG/M2 | HEART RATE: 102 BPM | SYSTOLIC BLOOD PRESSURE: 134 MMHG | WEIGHT: 195 LBS | DIASTOLIC BLOOD PRESSURE: 86 MMHG

## 2023-01-06 DIAGNOSIS — I10 ESSENTIAL HYPERTENSION: ICD-10-CM

## 2023-01-06 DIAGNOSIS — I48.0 PAF (PAROXYSMAL ATRIAL FIBRILLATION) (HCC): Primary | ICD-10-CM

## 2023-01-06 DIAGNOSIS — R07.9 CHEST PAIN, UNSPECIFIED TYPE: ICD-10-CM

## 2023-01-06 PROCEDURE — 3079F DIAST BP 80-89 MM HG: CPT | Performed by: NURSE PRACTITIONER

## 2023-01-06 PROCEDURE — 93000 ELECTROCARDIOGRAM COMPLETE: CPT | Performed by: NURSE PRACTITIONER

## 2023-01-06 PROCEDURE — 1123F ACP DISCUSS/DSCN MKR DOCD: CPT | Performed by: NURSE PRACTITIONER

## 2023-01-06 PROCEDURE — 99214 OFFICE O/P EST MOD 30 MIN: CPT | Performed by: NURSE PRACTITIONER

## 2023-01-06 PROCEDURE — 3075F SYST BP GE 130 - 139MM HG: CPT | Performed by: NURSE PRACTITIONER

## 2023-01-06 NOTE — PATIENT INSTRUCTIONS
No changes today     Monitor BP and heart rate at home and call if consistently out of goal ranges    Follow up in one year or sooner if needed

## 2023-02-02 ENCOUNTER — ANTI-COAG VISIT (OUTPATIENT)
Dept: PHARMACY | Age: 83
End: 2023-02-02

## 2023-02-02 DIAGNOSIS — I48.0 PAF (PAROXYSMAL ATRIAL FIBRILLATION) (HCC): Primary | ICD-10-CM

## 2023-02-02 LAB — INR BLD: 2.8

## 2023-02-02 NOTE — PROGRESS NOTES
Ms. Melyssa Rodriguez is here for management of anticoagulation for Afib. Patient was started on Warfarin on 8/6/15. PMH also significant for colonic polyp, and vertigo. She presents today w/out complaint. Pt verifies dosing regimen. No missed doses. Pt denies s/sx bleeding/swelling/SOB. No changes in Rx/OTC/herbal medications. Reports that she takes 1-2 Aleve here and there as needed. No tobacco use. She reports that she drinks one EtOH drink about every night. Patient reports no changes since last visit. INR 2.8 is within the therapeutic range of 2-3   Recommend to continue dose of 2.5 mg Sun/Tues/Thurs and 5 mg all other days. Patient has 5 mg tablets. Will monitor and check INR in 4 weeks. Dosing reminder card given with phone number, appointment date and time.    Return to clinic: 3/2  @ 10:30 AM  Referring MD: Dr. Ben Valentino  Cardiologist : Dr Evie Molina (r) [phone] - fax summary too

## 2023-02-09 DIAGNOSIS — I48.0 PAF (PAROXYSMAL ATRIAL FIBRILLATION) (HCC): ICD-10-CM

## 2023-02-09 RX ORDER — DILTIAZEM HYDROCHLORIDE 120 MG/1
CAPSULE, EXTENDED RELEASE ORAL
Qty: 30 CAPSULE | Refills: 11 | Status: SHIPPED | OUTPATIENT
Start: 2023-02-09

## 2023-03-02 ENCOUNTER — ANTI-COAG VISIT (OUTPATIENT)
Dept: PHARMACY | Age: 83
End: 2023-03-02
Payer: MEDICARE

## 2023-03-02 DIAGNOSIS — I48.0 PAF (PAROXYSMAL ATRIAL FIBRILLATION) (HCC): Primary | ICD-10-CM

## 2023-03-02 LAB — INR BLD: 3.1

## 2023-03-02 PROCEDURE — 85610 PROTHROMBIN TIME: CPT

## 2023-03-02 PROCEDURE — 99211 OFF/OP EST MAY X REQ PHY/QHP: CPT

## 2023-03-02 NOTE — PROGRESS NOTES
Ms. Nia Mendoza is here for management of anticoagulation for Afib. Patient was started on Warfarin on 8/6/15. PMH also significant for colonic polyp, and vertigo. She presents today w/out complaint. Pt verifies dosing regimen. No missed doses. Pt denies s/sx bleeding/swelling/SOB. No changes in Rx/OTC/herbal medications. Reports that she takes 1-2 Aleve here and there as needed. No tobacco use. She reports that she drinks one EtOH drink about every night. Patient reports no changes since last visit. INR 3.1 is within acceptable therapeutic range of 2-3   Recommend to continue dose of 2.5 mg Sun/Tues/Thurs and 5 mg all other days. Patient has 5 mg tablets. Will monitor and check INR in 4 weeks. Dosing reminder card given with phone number, appointment date and time.    Return to clinic: 4/7  @ 10:30 AM  Referring MD: Dr. Kirk Montana  Cardiologist : Dr Steve Ramirez [phone] - fax summary too      For Pharmacy Admin Tracking Only    Intervention Detail:   Total # of Interventions Recommended: 0  Total # of Interventions Accepted: 0  Time Spent (min): 15

## 2023-04-07 ENCOUNTER — ANTI-COAG VISIT (OUTPATIENT)
Dept: PHARMACY | Age: 83
End: 2023-04-07
Payer: MEDICARE

## 2023-04-07 DIAGNOSIS — I48.0 PAF (PAROXYSMAL ATRIAL FIBRILLATION) (HCC): Primary | ICD-10-CM

## 2023-04-07 LAB — INR BLD: 4.6

## 2023-04-07 PROCEDURE — 85610 PROTHROMBIN TIME: CPT

## 2023-04-07 PROCEDURE — 99211 OFF/OP EST MAY X REQ PHY/QHP: CPT

## 2023-04-07 NOTE — PROGRESS NOTES
Ms. Melia Abebe is here for management of anticoagulation for Afib. Patient was started on Warfarin on 8/6/15. PMH also significant for colonic polyp, and vertigo. She presents today w/out complaint. Pt verifies dosing regimen. No missed doses. Pt denies s/sx bleeding/swelling/SOB. No changes in Rx/OTC/herbal medications. Reports that she takes 1-2 Aleve here and there as needed. No tobacco use. She reports that she drinks one EtOH drink about every night. Patient reports no changes since last visit. INR 4.6 is above acceptable therapeutic range of 2-3   Recommend to hold Warfarin tonight, take 2.5 Sat, then continue dose of 2.5 mg Sun/Tu/Thurs and 5 mg all other days. Patient has 5 mg tablets. Will monitor and check INR in 1 week. Dosing reminder card given with phone number, appointment date and time.    Return to clinic:  @ 8325  Referring MD: Dr. Lila Darling  Cardiologist : Dr Yasmin Bedolla (n) [phone] - fax summary too      For Pharmacy Admin Tracking Only    Intervention Detail: Adherence Monitorin  Total # of Interventions Recommended: 1  Total # of Interventions Accepted: 1  Time Spent (min): 15

## 2023-04-21 ENCOUNTER — ANTI-COAG VISIT (OUTPATIENT)
Dept: PHARMACY | Age: 83
End: 2023-04-21
Payer: MEDICARE

## 2023-04-21 DIAGNOSIS — I48.0 PAF (PAROXYSMAL ATRIAL FIBRILLATION) (HCC): Primary | ICD-10-CM

## 2023-04-21 LAB — INR BLD: 2.9

## 2023-04-21 PROCEDURE — 85610 PROTHROMBIN TIME: CPT

## 2023-04-21 PROCEDURE — 99211 OFF/OP EST MAY X REQ PHY/QHP: CPT

## 2023-04-21 NOTE — PROGRESS NOTES
Ms. Kaitlin Roth is here for management of anticoagulation for Afib. Patient was started on Warfarin on 8/6/15. PMH also significant for colonic polyp, and vertigo. She presents today w/out complaint. Pt verifies dosing regimen. No missed doses. Pt denies s/sx bleeding/swelling/SOB. No changes in Rx/OTC/herbal medications. Reports that she takes 1-2 Aleve here and there as needed. No tobacco use. She reports that she drinks one EtOH drink about every night. Patient reports no changes since last visit. INR 2.9 is within acceptable therapeutic range of 2-3   Recommend to continue 5 mg M/W/F and 2.5 mg all other days. Patient has 5 mg tablets. Will monitor and check INR in 2 weeks. Dosing reminder card given with phone number, appointment date and time.    Return to clinic: 5/5 1015  Referring MD: Dr. Mckay [place]  Cardiologist : Dr Wade Park f) [phone] - fax summary too    For Pharmacy Admin Tracking Only    Intervention Detail:   Total # of Interventions Recommended: 0  Total # of Interventions Accepted: 0  Time Spent (min): 15

## 2023-05-05 ENCOUNTER — ANTI-COAG VISIT (OUTPATIENT)
Dept: PHARMACY | Age: 83
End: 2023-05-05
Payer: MEDICARE

## 2023-05-05 DIAGNOSIS — I48.0 PAF (PAROXYSMAL ATRIAL FIBRILLATION) (HCC): Primary | ICD-10-CM

## 2023-05-05 LAB — INR BLD: 3.1

## 2023-05-05 PROCEDURE — 85610 PROTHROMBIN TIME: CPT

## 2023-05-05 PROCEDURE — 99211 OFF/OP EST MAY X REQ PHY/QHP: CPT

## 2023-05-05 NOTE — PROGRESS NOTES
Ms. Aspen Guzman is here for management of anticoagulation for Afib. Patient was started on Warfarin on 8/6/15. PMH also significant for colonic polyp, and vertigo. She presents today w/out complaint. Pt verifies dosing regimen. No missed doses. Pt denies s/sx bleeding/swelling/SOB. No changes in Rx/OTC/herbal medications. Reports that she takes 1-2 Aleve here and there as needed. No tobacco use. She reports that she drinks one EtOH drink about every night. Patient reports no changes since last visit. INR 3.1 is within acceptable therapeutic range of 2-3   Recommend to continue 5 mg M/W/F and 2.5 mg all other days. Pt states she will inc her greens just a bit. Patient has Warfarin 5 mg tablets. Will monitor and check INR in 4 weeks. Dosing reminder card given with phone number, appointment date and time.    Return to clinic: 6/2  1015  Referring MD: Dr. Kevin Laureano  Cardiologist : Dr Pascale Olivo (g) [phone] - fax summary too      For Pharmacy Admin Tracking Only    Intervention Detail:   Total # of Interventions Recommended: 0  Total # of Interventions Accepted: 0  Time Spent (min): 15

## 2023-06-07 ENCOUNTER — ANTI-COAG VISIT (OUTPATIENT)
Dept: PHARMACY | Age: 83
End: 2023-06-07
Payer: MEDICARE

## 2023-06-07 DIAGNOSIS — I48.0 PAF (PAROXYSMAL ATRIAL FIBRILLATION) (HCC): Primary | ICD-10-CM

## 2023-06-07 LAB — INR BLD: 3.1

## 2023-06-07 PROCEDURE — 99211 OFF/OP EST MAY X REQ PHY/QHP: CPT

## 2023-06-07 PROCEDURE — 85610 PROTHROMBIN TIME: CPT

## 2023-06-07 NOTE — PROGRESS NOTES
Ms. Bri White is here for management of anticoagulation for Afib. Patient was started on Warfarin on 8/6/15. PMH also significant for colonic polyp, and vertigo. She presents today w/out complaint. Pt verifies dosing regimen. No missed doses. Pt denies s/sx bleeding/swelling/SOB. No changes in Rx/OTC/herbal medications. Reports that she takes 1-2 Aleve here and there as needed. No tobacco use. She reports that she drinks one EtOH drink about every night. Patient reports no changes since last visit. INR 3.1 is within acceptable therapeutic range of 2-3   Recommend to continue 5 mg M/W/F and 2.5 mg all other days. Pt states she will inc her greens just a bit. Patient has Warfarin 5 mg tablets. Will monitor and check INR in 4 weeks. Dosing reminder card given with phone number, appointment date and time.    Return to clinic: 7/7/23  @ 5279  Referring MD: Dr. Jeanette Cobos  Cardiologist : Dr Joellen Torres (a) [phone] - fax summary too      Shannan Thomason  PharmD candidate    For Pharmacy Admin Tracking Only    Intervention Detail:   Total # of Interventions Recommended: 0  Total # of Interventions Accepted: 0  Time Spent (min): 15

## 2023-07-07 ENCOUNTER — ANTI-COAG VISIT (OUTPATIENT)
Dept: PHARMACY | Age: 83
End: 2023-07-07
Payer: MEDICARE

## 2023-07-07 DIAGNOSIS — I48.0 PAF (PAROXYSMAL ATRIAL FIBRILLATION) (HCC): Primary | ICD-10-CM

## 2023-07-07 LAB — INTERNATIONAL NORMALIZATION RATIO, POC: 2.9

## 2023-07-07 PROCEDURE — 85610 PROTHROMBIN TIME: CPT | Performed by: PHARMACIST

## 2023-07-07 PROCEDURE — 99211 OFF/OP EST MAY X REQ PHY/QHP: CPT | Performed by: PHARMACIST

## 2023-07-07 NOTE — PROGRESS NOTES
Ms. Lakeisha Kaminski is here for management of anticoagulation for Afib. Patient was started on Warfarin on 8/6/15. PMH also significant for colonic polyp, and vertigo. She presents today w/out complaint. Pt verifies dosing regimen. No missed doses. Pt denies s/sx bleeding/swelling/SOB. No changes in Rx/OTC/herbal medications. Reports that she takes 1-2 Aleve here and there as needed. No tobacco use. She reports that she drinks one EtOH drink about every night. Patient reports no changes since last visit. INR 2.9 is within acceptable therapeutic range of 2-3   Recommend to continue 5 mg M/W/F and 2.5 mg all other days. Patient has Warfarin 5 mg tablets. Will monitor and check INR in 4 weeks. Dosing reminder card given with phone number, appointment date and time.    Return to clinic: 8/4/23  @ 9431  Referring MD: Dr. Rizzo Needs  Cardiologist : Dr Felipe Monteiro (e) [phone] - fax summary too    Brenton Govea PharmD 10:36 AM EDT 7/7/23    For Pharmacy Admin Tracking Only    Intervention Detail:   Total # of Interventions Recommended: 0  Total # of Interventions Accepted: 0  Time Spent (min): 15

## 2023-08-03 ENCOUNTER — APPOINTMENT (OUTPATIENT)
Dept: CT IMAGING | Age: 83
End: 2023-08-03
Payer: MEDICARE

## 2023-08-03 ENCOUNTER — APPOINTMENT (OUTPATIENT)
Dept: GENERAL RADIOLOGY | Age: 83
End: 2023-08-03
Payer: MEDICARE

## 2023-08-03 ENCOUNTER — HOSPITAL ENCOUNTER (EMERGENCY)
Age: 83
Discharge: HOME | End: 2023-08-03
Payer: MEDICARE

## 2023-08-03 VITALS
TEMPERATURE: 97.8 F | HEART RATE: 62 BPM | HEIGHT: 63 IN | OXYGEN SATURATION: 100 % | SYSTOLIC BLOOD PRESSURE: 146 MMHG | WEIGHT: 188 LBS | BODY MASS INDEX: 33.31 KG/M2 | RESPIRATION RATE: 16 BRPM | DIASTOLIC BLOOD PRESSURE: 78 MMHG

## 2023-08-03 DIAGNOSIS — M25.551 RIGHT HIP PAIN: Primary | ICD-10-CM

## 2023-08-03 LAB
ALBUMIN SERPL-MCNC: 4.1 G/DL (ref 3.4–5)
ALBUMIN/GLOB SERPL: 1.4 {RATIO} (ref 1.1–2.2)
ALP SERPL-CCNC: 124 U/L (ref 40–129)
ALT SERPL-CCNC: 20 U/L (ref 10–40)
ANION GAP SERPL CALCULATED.3IONS-SCNC: 12 MMOL/L (ref 3–16)
AST SERPL-CCNC: 26 U/L (ref 15–37)
BASOPHILS # BLD: 0 K/UL (ref 0–0.2)
BASOPHILS NFR BLD: 0.6 %
BILIRUB SERPL-MCNC: 0.6 MG/DL (ref 0–1)
BILIRUB UR QL STRIP.AUTO: NEGATIVE
BUN SERPL-MCNC: 9 MG/DL (ref 7–20)
CALCIUM SERPL-MCNC: 9.4 MG/DL (ref 8.3–10.6)
CHLORIDE SERPL-SCNC: 104 MMOL/L (ref 99–110)
CLARITY UR: CLEAR
CO2 SERPL-SCNC: 21 MMOL/L (ref 21–32)
COLOR UR: NORMAL
CREAT SERPL-MCNC: <0.5 MG/DL (ref 0.6–1.2)
DEPRECATED RDW RBC AUTO: 13.4 % (ref 12.4–15.4)
EKG ATRIAL RATE: 80 BPM
EKG DIAGNOSIS: NORMAL
EKG P AXIS: 51 DEGREES
EKG P-R INTERVAL: 150 MS
EKG Q-T INTERVAL: 400 MS
EKG QRS DURATION: 74 MS
EKG QTC CALCULATION (BAZETT): 461 MS
EKG R AXIS: 13 DEGREES
EKG T AXIS: 11 DEGREES
EKG VENTRICULAR RATE: 80 BPM
EOSINOPHIL # BLD: 0 K/UL (ref 0–0.6)
EOSINOPHIL NFR BLD: 0.1 %
FLUAV RNA RESP QL NAA+PROBE: NOT DETECTED
FLUBV RNA RESP QL NAA+PROBE: NOT DETECTED
GFR SERPLBLD CREATININE-BSD FMLA CKD-EPI: >60 ML/MIN/{1.73_M2}
GLUCOSE SERPL-MCNC: 102 MG/DL (ref 70–99)
GLUCOSE UR STRIP.AUTO-MCNC: NEGATIVE MG/DL
HCT VFR BLD AUTO: 45.5 % (ref 36–48)
HGB BLD-MCNC: 16 G/DL (ref 12–16)
HGB UR QL STRIP.AUTO: NEGATIVE
INR PPP: 2.19 (ref 0.84–1.16)
KETONES UR STRIP.AUTO-MCNC: NEGATIVE MG/DL
LEUKOCYTE ESTERASE UR QL STRIP.AUTO: NEGATIVE
LYMPHOCYTES # BLD: 1.7 K/UL (ref 1–5.1)
LYMPHOCYTES NFR BLD: 21.4 %
MCH RBC QN AUTO: 34 PG (ref 26–34)
MCHC RBC AUTO-ENTMCNC: 35.1 G/DL (ref 31–36)
MCV RBC AUTO: 96.9 FL (ref 80–100)
MONOCYTES # BLD: 0.6 K/UL (ref 0–1.3)
MONOCYTES NFR BLD: 7 %
NEUTROPHILS # BLD: 5.7 K/UL (ref 1.7–7.7)
NEUTROPHILS NFR BLD: 70.9 %
NITRITE UR QL STRIP.AUTO: NEGATIVE
NT-PROBNP SERPL-MCNC: 209 PG/ML (ref 0–449)
PH UR STRIP.AUTO: 8 [PH] (ref 5–8)
PLATELET # BLD AUTO: 209 K/UL (ref 135–450)
PMV BLD AUTO: 7.7 FL (ref 5–10.5)
POTASSIUM SERPL-SCNC: 4 MMOL/L (ref 3.5–5.1)
PROT SERPL-MCNC: 7 G/DL (ref 6.4–8.2)
PROT UR STRIP.AUTO-MCNC: NEGATIVE MG/DL
PROTHROMBIN TIME: 24.2 SEC (ref 11.5–14.8)
RBC # BLD AUTO: 4.7 M/UL (ref 4–5.2)
SARS-COV-2 RNA RESP QL NAA+PROBE: NOT DETECTED
SODIUM SERPL-SCNC: 137 MMOL/L (ref 136–145)
SP GR UR STRIP.AUTO: 1.01 (ref 1–1.03)
UA COMPLETE W REFLEX CULTURE PNL UR: NORMAL
UA DIPSTICK W REFLEX MICRO PNL UR: NORMAL
URN SPEC COLLECT METH UR: NORMAL
UROBILINOGEN UR STRIP-ACNC: 0.2 E.U./DL
WBC # BLD AUTO: 8.1 K/UL (ref 4–11)

## 2023-08-03 PROCEDURE — 6370000000 HC RX 637 (ALT 250 FOR IP): Performed by: PHYSICIAN ASSISTANT

## 2023-08-03 PROCEDURE — 81003 URINALYSIS AUTO W/O SCOPE: CPT

## 2023-08-03 PROCEDURE — 73502 X-RAY EXAM HIP UNI 2-3 VIEWS: CPT

## 2023-08-03 PROCEDURE — 93010 ELECTROCARDIOGRAM REPORT: CPT | Performed by: INTERNAL MEDICINE

## 2023-08-03 PROCEDURE — 80053 COMPREHEN METABOLIC PANEL: CPT

## 2023-08-03 PROCEDURE — 87636 SARSCOV2 & INF A&B AMP PRB: CPT

## 2023-08-03 PROCEDURE — 73700 CT LOWER EXTREMITY W/O DYE: CPT

## 2023-08-03 PROCEDURE — 99285 EMERGENCY DEPT VISIT HI MDM: CPT

## 2023-08-03 PROCEDURE — 71046 X-RAY EXAM CHEST 2 VIEWS: CPT

## 2023-08-03 PROCEDURE — 83880 ASSAY OF NATRIURETIC PEPTIDE: CPT

## 2023-08-03 PROCEDURE — 93005 ELECTROCARDIOGRAM TRACING: CPT | Performed by: PHYSICIAN ASSISTANT

## 2023-08-03 PROCEDURE — 85025 COMPLETE CBC W/AUTO DIFF WBC: CPT

## 2023-08-03 PROCEDURE — 85610 PROTHROMBIN TIME: CPT

## 2023-08-03 RX ORDER — OXYCODONE HYDROCHLORIDE 5 MG/1
5 TABLET ORAL EVERY 8 HOURS PRN
Qty: 9 TABLET | Refills: 0 | Status: SHIPPED | OUTPATIENT
Start: 2023-08-03 | End: 2023-08-06

## 2023-08-03 RX ORDER — ACETAMINOPHEN 325 MG/1
650 TABLET ORAL ONCE
Status: COMPLETED | OUTPATIENT
Start: 2023-08-03 | End: 2023-08-03

## 2023-08-03 RX ORDER — OXYCODONE HYDROCHLORIDE 5 MG/1
5 TABLET ORAL ONCE
Status: COMPLETED | OUTPATIENT
Start: 2023-08-03 | End: 2023-08-03

## 2023-08-03 RX ADMIN — OXYCODONE HYDROCHLORIDE 5 MG: 5 TABLET ORAL at 17:02

## 2023-08-03 RX ADMIN — ACETAMINOPHEN 650 MG: 325 TABLET ORAL at 16:13

## 2023-08-03 ASSESSMENT — PAIN - FUNCTIONAL ASSESSMENT: PAIN_FUNCTIONAL_ASSESSMENT: 0-10

## 2023-08-03 ASSESSMENT — PAIN DESCRIPTION - ORIENTATION
ORIENTATION: RIGHT

## 2023-08-03 ASSESSMENT — PAIN SCALES - GENERAL
PAINLEVEL_OUTOF10: 7
PAINLEVEL_OUTOF10: 10
PAINLEVEL_OUTOF10: 10

## 2023-08-03 ASSESSMENT — PAIN DESCRIPTION - LOCATION
LOCATION: HIP

## 2023-08-03 ASSESSMENT — LIFESTYLE VARIABLES
HOW OFTEN DO YOU HAVE A DRINK CONTAINING ALCOHOL: 4 OR MORE TIMES A WEEK
HOW MANY STANDARD DRINKS CONTAINING ALCOHOL DO YOU HAVE ON A TYPICAL DAY: 1 OR 2

## 2023-08-03 NOTE — ED PROVIDER NOTES
[address] [place] [address]  ED  EMERGENCY DEPARTMENT ENCOUNTER        Pt Name: Mauricio Garza  MRN: [id number]  [facility] 1940  Date of evaluation: 8/3/2023  Provider: ANABEL Titus  PCP: Farideh Kerns MD  Note Started: 3:34 PM EDT 8/3/23      KURTIS. I have evaluated this patient. CHIEF COMPLAINT       Chief Complaint   Patient presents with    Hip Pain     Woke up with right hip pain, no known injury, unable to bear weight on right leg        HISTORY OF PRESENT ILLNESS: 1 or more Elements     History from : Patient    Limitations to history : None    Mauricio Garza is a 81 y.o. female who presents to the emergency department today complaining of right hip pain. Patient has no known injury. She has previous hip replacement on the left. She states she woke up this morning was unable to bear weight on her right leg. Her  was unable to help her therefore EMS was called. Patient is also complaining of generalized fatigue and increased thirst.  She states she is eating and drinking appropriately. She states she has drank 312 ounce bottles of water this morning. She denies any fevers or chills. No chest pain, shortness of breath or cough. She denies any dysuria or hematuria. No diarrhea. Nursing Notes were all reviewed and agreed with or any disagreements were addressed in the HPI. REVIEW OF SYSTEMS :      Review of Systems    Positives and Pertinent negatives as per HPI.      SURGICAL HISTORY     Past Surgical History:   Procedure Laterality Date    COLONOSCOPY  07/31/2007    HUMERUS FRACTURE SURGERY Right 10/20/2020    OPEN REDUCTION INTERNAL FIXATION RIGHT DISTAL HUMERUS FRACTURE performed by Ekaterina Aden MD at [address] [place] [address] Left 04/2016    hip    TONSILLECTOMY         CURRENTMEDICATIONS       Discharge Medication List as of 8/3/2023  4:58 PM        CONTINUE these medications which have NOT CHANGED    Details   dilTIAZem (TIADYLT ER)

## 2023-08-03 NOTE — ED NOTES
Written and verbal discharge provided to patient and family member, patient verbalizes understanding. Patient with no acute signs or symptoms of distress. Patient discharged at this time with family member, escorted to car via wheelchair.      Sue Ge RN  08/03/23 [id number]

## 2023-08-03 NOTE — DISCHARGE INSTRUCTIONS
You were seen in the emergency department today for hip pain. We did obtain x-ray and CT scan of your hip which shows no acute bony abnormality however noted to have moderate right hip osteoarthritis. I have referred you to orthopedics for further evaluation and treatment. Continue taking Tylenol for pain as needed. I have also prescribed oxycodone for breakthrough pain to take as needed.

## 2023-08-11 ENCOUNTER — ANTI-COAG VISIT (OUTPATIENT)
Dept: PHARMACY | Age: 83
End: 2023-08-11
Payer: MEDICARE

## 2023-08-11 DIAGNOSIS — I48.0 PAF (PAROXYSMAL ATRIAL FIBRILLATION) (HCC): Primary | ICD-10-CM

## 2023-08-11 LAB — INR BLD: 3.5

## 2023-08-11 PROCEDURE — 99211 OFF/OP EST MAY X REQ PHY/QHP: CPT | Performed by: HOME HEALTH

## 2023-08-11 PROCEDURE — 85610 PROTHROMBIN TIME: CPT | Performed by: HOME HEALTH

## 2023-08-11 NOTE — PROGRESS NOTES
Ms. Serafin Goldberg is here for management of anticoagulation for Afib. Patient was started on Warfarin on 8/6/15. PMH also significant for colonic polyp, and vertigo. She presents today w/out complaint. Pt verifies dosing regimen. No missed doses. Pt denies s/sx bleeding/swelling/SOB. No changes in Rx/OTC/herbal medications. Reports that she takes 1-2 Aleve here and there as needed. No tobacco use. She reports that she drinks one EtOH drink about every night. She is going to try to consistently have greens 3 times a week (previously did 2). PT had debilitating pain in her hip and was in the ER , was given a few days of Oxy. Sees the ortho  next [address] INR 3.5 is slightly above acceptable therapeutic range of 2-3, prob d/t pt not eating normally from acute pain. Recommend to hold tonight then continue 5 mg M/W/ and 2.5 mg all other days. Pt will add back greens. Patient has Warfarin 5 mg tablets. Will monitor and check INR in 3 weeks. Dosing reminder card given with phone number, appointment date and time.    Return to clinic: 23  @ 0725  Referring MD: Dr. Heraclio Lanier  Cardiologist : Dr Ishan Ansari f) [phone] - fax summary too    For Pharmacy Admin Tracking Only    Intervention Detail: Adherence Monitorin  Total # of Interventions Recommended: 1  Total # of Interventions Accepted: 1  Time Spent (min): 15

## 2023-08-15 ENCOUNTER — OFFICE VISIT (OUTPATIENT)
Dept: ORTHOPEDIC SURGERY | Age: 83
End: 2023-08-15
Payer: MEDICARE

## 2023-08-15 VITALS — BODY MASS INDEX: 33.31 KG/M2 | WEIGHT: 188 LBS | HEIGHT: 63 IN

## 2023-08-15 DIAGNOSIS — M70.61 TROCHANTERIC BURSITIS OF RIGHT HIP: Primary | ICD-10-CM

## 2023-08-15 PROCEDURE — 99203 OFFICE O/P NEW LOW 30 MIN: CPT | Performed by: ORTHOPAEDIC SURGERY

## 2023-08-15 PROCEDURE — 1123F ACP DISCUSS/DSCN MKR DOCD: CPT | Performed by: ORTHOPAEDIC SURGERY

## 2023-08-15 ASSESSMENT — ENCOUNTER SYMPTOMS
SHORTNESS OF BREATH: 0
BACK PAIN: 0
WHEEZING: 0
ABDOMINAL PAIN: 0
COUGH: 0

## 2023-08-15 NOTE — PROGRESS NOTES
ORTHOPAEDIC SURGERY INITIAL EVALUATION NOTE  Chief Complaint   Patient presents with    Hip Pain     R Hip        HISTORY OF PRESENT ILLNESS:    Bhavani Riojas is a 81 y.o. female w/ a Hx of A-fib on Warfarin, L MIGUEL ANGEL (>5 yrs ago), vertigo who presents for R hip pain. Reports that on 8/3/23, she woke up and was unable to get off the bed in the morning and bear weight d/t R lateral hip pain. Denies any radiation of pain or loss of sensation. ED evaluation with X-ray and CT R hip/pelvis showed no acute abnormality. Received Tylenol for pain. Has been using cane and rollater when outside her house for ease of ambulation. Uses cane at home. Previous L hip replaced several years ago. Denies any recurrence of R hi pain. Denies any groin, buttocks or back pain. Past Medical History:   Diagnosis Date    Arthritis     spine and left hip    Atrial fibrillation (HCC)     Colonic polyp 07/31/2007    Paroxysmal atrial fibrillation (HCC) 8/6/2015    Vertigo        Current Outpatient Medications   Medication Sig Dispense Refill    dilTIAZem (TIADYLT ER) 120 MG extended release capsule TAKE 1 CAPSULE BY MOUTH EVERY DAY 30 capsule 11    Acetaminophen (TYLENOL 8 HOUR PO) Take by mouth As needed      warfarin (COUMADIN) 5 MG tablet Take 1 tablet by mouth daily (Patient taking differently: Take 1 tablet by mouth daily 2.5mg Tues, Thurs, Sun.   5mg Mon, Wed, Fri, Sat) 90 tablet 1    meclizine (ANTIVERT) 25 MG tablet Take 25 mg by mouth 3 times daily as needed      Glucosamine Sulfate 750 MG TABS Take 1 tablet by mouth daily. Multiple Vitamin (MULTIVITAMIN PO) Take  by mouth. LUTEIN PO Take  by mouth. No current facility-administered medications for this visit.         Past Surgical History:   Procedure Laterality Date    COLONOSCOPY  07/31/2007    HUMERUS FRACTURE SURGERY Right 10/20/2020    OPEN REDUCTION INTERNAL FIXATION RIGHT DISTAL HUMERUS FRACTURE performed by Sanjeev Salomon MD at [address] [place] [address]

## 2023-09-01 ENCOUNTER — ANTI-COAG VISIT (OUTPATIENT)
Dept: PHARMACY | Age: 83
End: 2023-09-01
Payer: MEDICARE

## 2023-09-01 DIAGNOSIS — I48.0 PAF (PAROXYSMAL ATRIAL FIBRILLATION) (HCC): Primary | ICD-10-CM

## 2023-09-01 LAB — INR BLD: 3

## 2023-09-01 PROCEDURE — 85610 PROTHROMBIN TIME: CPT | Performed by: FAMILY MEDICINE

## 2023-09-01 PROCEDURE — 99211 OFF/OP EST MAY X REQ PHY/QHP: CPT | Performed by: FAMILY MEDICINE

## 2023-09-01 NOTE — PROGRESS NOTES
Ms. Margo Bullard is here for management of anticoagulation for Afib. Patient was started on Warfarin on 8/6/15. PMH also significant for colonic polyp, and vertigo. She presents today w/out complaint. Pt verifies dosing regimen. No missed doses. Pt denies s/sx bleeding/swelling/SOB. No changes in Rx/OTC/herbal medications. Reports that she takes 1-2 Aleve here and there as needed. No tobacco use. She reports that she drinks one EtOH drink about every night. She is going to try to consistently have greens 3 times a week (previously did 2). No changes per pt     INR 3.0 is within acceptable therapeutic range of 2-3  Recommend to continue 5 mg M// and 2.5 mg all other days. Patient has Warfarin 5 mg tablets. Will monitor and check INR in 4 weeks. Dosing reminder card given with phone number, appointment date and time.    Return to clinic:  @ 1045 am   Referring MD: Dr. Desean Rios  Cardiologist : Dr Richard Hendrix (g) [phone] - fax summary too    For Pharmacy Admin Tracking Only    Intervention Detail: Adherence Monitorin  Total # of Interventions Recommended: 1  Total # of Interventions Accepted: 1  Time Spent (min): [address], PharmD 2023 11:08 AM

## 2023-09-29 ENCOUNTER — ANTI-COAG VISIT (OUTPATIENT)
Dept: PHARMACY | Age: 83
End: 2023-09-29
Payer: MEDICARE

## 2023-09-29 DIAGNOSIS — I48.0 PAF (PAROXYSMAL ATRIAL FIBRILLATION) (HCC): Primary | ICD-10-CM

## 2023-09-29 LAB — INR BLD: 3

## 2023-09-29 PROCEDURE — 99211 OFF/OP EST MAY X REQ PHY/QHP: CPT | Performed by: HOME HEALTH

## 2023-09-29 PROCEDURE — 85610 PROTHROMBIN TIME: CPT | Performed by: HOME HEALTH

## 2023-09-29 NOTE — PROGRESS NOTES
Ms. Alisha Stiles is here for management of anticoagulation for Afib. Patient was started on Warfarin on 8/6/15. PMH also significant for colonic polyp, and vertigo. She presents today w/out complaint. Pt verifies dosing regimen. No missed doses. Pt denies s/sx bleeding/swelling/SOB. No changes in Rx/OTC/herbal medications. Reports that she takes 1-2 Aleve here and there as needed. No tobacco use. She reports that she drinks one EtOH drink about every night. She is going to try to consistently have greens 3 times a week (previously did 2). No changes per pt     INR 3.0 is within acceptable therapeutic range of 2-3  Recommend to continue 5 mg M/W/F and 2.5 mg all other days. Patient has Warfarin 5 mg tablets. Will monitor and check INR in 4 weeks. Dosing reminder card given with phone number, appointment date and time.    Return to clinic: 10/27 @ [phone]   Referring MD: Dr. Gomes [facility]  Cardiologist : Dr Eddie Sebastian (v) [phone] - fax summary too    For Pharmacy Admin Tracking Only    Intervention Detail:   Total # of Interventions Recommended: 0  Total # of Interventions Accepted: 0  Time Spent (min): 15

## 2023-10-27 ENCOUNTER — ANTI-COAG VISIT (OUTPATIENT)
Dept: PHARMACY | Age: 83
End: 2023-10-27
Payer: MEDICARE

## 2023-10-27 DIAGNOSIS — I48.0 PAF (PAROXYSMAL ATRIAL FIBRILLATION) (HCC): Primary | ICD-10-CM

## 2023-10-27 LAB — INR BLD: 3.6

## 2023-10-27 PROCEDURE — 85610 PROTHROMBIN TIME: CPT | Performed by: HOME HEALTH

## 2023-10-27 PROCEDURE — 99211 OFF/OP EST MAY X REQ PHY/QHP: CPT | Performed by: HOME HEALTH

## 2023-10-27 NOTE — PROGRESS NOTES
Ms. Jenny Olea is here for management of anticoagulation for Afib. Patient was started on Warfarin on 8/6/15. PMH also significant for colonic polyp, and vertigo. She presents today w/out complaint. Pt verifies dosing regimen. No missed doses. Pt denies s/sx bleeding/swelling/SOB. No changes in Rx/OTC/herbal medications. Reports that she takes 1-2 Aleve here and there as needed. No tobacco use. She reports that she drinks one EtOH drink about every night. She is going to try to consistently have greens 3 times a week (previously did 2). No changes per pt     INR 3.6 is above acceptable therapeutic range of 2-3  Recommend to hold this pm then dec to  5 mg M/Th and 2.5 mg all other days. Patient has Warfarin 5 mg tablets. Will monitor and check INR in 2 weeks. Dosing reminder card given with phone number, appointment date and time.    Return to clinic: 11/10 @ 1130am  Referring MD: Dr. Ronda Zeng  Cardiologist : Dr Merida Shows [phone]) [phone] - fax summary too    For Pharmacy Admin Tracking Only    Intervention Detail: Dose Adjustment: 1, reason: Therapy De-escalation  Total # of Interventions Recommended: 1  Total # of Interventions Accepted: 1  Time Spent (min): 15

## 2023-11-10 ENCOUNTER — ANTI-COAG VISIT (OUTPATIENT)
Dept: PHARMACY | Age: 83
End: 2023-11-10
Payer: MEDICARE

## 2023-11-10 DIAGNOSIS — I48.0 PAF (PAROXYSMAL ATRIAL FIBRILLATION) (HCC): Primary | ICD-10-CM

## 2023-11-10 LAB — INTERNATIONAL NORMALIZATION RATIO, POC: 2.5

## 2023-11-10 PROCEDURE — 85610 PROTHROMBIN TIME: CPT | Performed by: PHARMACIST

## 2023-11-10 PROCEDURE — 99211 OFF/OP EST MAY X REQ PHY/QHP: CPT | Performed by: PHARMACIST

## 2023-11-10 NOTE — PROGRESS NOTES
Ms. Caron Flores is here for management of anticoagulation for Afib. Patient was started on Warfarin on 8/6/15. PMH also significant for colonic polyp, and vertigo. She presents today w/out complaint. Pt verifies dosing regimen. No missed doses. Pt denies s/sx bleeding/swelling/SOB. No changes in Rx/OTC/herbal medications. Reports that she takes 1-2 Aleve here and there as needed. No tobacco use. She reports that she drinks one EtOH drink about every night. She is going to try to consistently have greens 3 times a week (previously did 2). No changes per pt. She did continue with more greens as discussed last visit. INR 2.5 is within acceptable therapeutic range of 2-3  Recommend to continue dose of 5 mg M/ and 2.5 mg all other days. Patient has Warfarin 5 mg tablets. Will monitor and check INR in 4 weeks. Dosing reminder card given with phone number, appointment date and time.    Return to clinic: 23 @ 1100 am  Referring MD: Dr. Dylon Alford  Cardiologist : Dr Zuniga Life ([phone]) [phone] - fax summary too    Sunita Hoyt, PharmD 11:43 AM EST 11/10/23    For Pharmacy Admin Tracking Only    Intervention Detail: Adherence Monitorin  Total # of Interventions Recommended: 1  Total # of Interventions Accepted: 1  Time Spent (min): 15

## 2023-12-08 ENCOUNTER — ANTI-COAG VISIT (OUTPATIENT)
Dept: PHARMACY | Age: 83
End: 2023-12-08
Payer: MEDICARE

## 2023-12-08 DIAGNOSIS — I48.0 PAF (PAROXYSMAL ATRIAL FIBRILLATION) (HCC): Primary | ICD-10-CM

## 2023-12-08 LAB — INR BLD: 2.2

## 2023-12-08 PROCEDURE — 99211 OFF/OP EST MAY X REQ PHY/QHP: CPT | Performed by: HOME HEALTH

## 2023-12-08 PROCEDURE — 85610 PROTHROMBIN TIME: CPT | Performed by: HOME HEALTH

## 2023-12-08 NOTE — PROGRESS NOTES
Ms. Ai Shi is here for management of anticoagulation for Afib. Patient was started on Warfarin on 8/6/15. PMH also significant for colonic polyp, and vertigo. She presents today w/out complaint. Pt verifies dosing regimen. No missed doses. Pt denies s/sx bleeding/swelling/SOB. No changes in Rx/OTC/herbal medications. Reports that she takes 1-2 Aleve here and there as needed. No tobacco use. She reports that she drinks one EtOH drink about every night. INR 2.2 is within acceptable therapeutic range of 2-3  Recommend to continue dose of 5 mg M/Th and 2.5 mg all other days. Patient has Warfarin 5 mg tablets. Will monitor and check INR in 4 weeks. Dosing reminder card given with phone number, appointment date and time.    Return to clinic: 1/5/23 @ 11:45 am  Referring MD: Dr. Ny [facility]  Cardiologist : Dr Ashwini Bush [phone]) [phone] - fax summary too    For Pharmacy Admin Tracking Only    Intervention Detail:   Total # of Interventions Recommended: 0  Total # of Interventions Accepted: 0  Time Spent (min): 15

## 2024-01-10 ENCOUNTER — ANTI-COAG VISIT (OUTPATIENT)
Dept: PHARMACY | Age: 84
End: 2024-01-10
Payer: MEDICARE

## 2024-01-10 DIAGNOSIS — I48.0 PAF (PAROXYSMAL ATRIAL FIBRILLATION) (HCC): Primary | ICD-10-CM

## 2024-01-10 LAB — INTERNATIONAL NORMALIZATION RATIO, POC: 2.9

## 2024-01-10 PROCEDURE — 85610 PROTHROMBIN TIME: CPT

## 2024-01-10 PROCEDURE — 99211 OFF/OP EST MAY X REQ PHY/QHP: CPT

## 2024-01-10 NOTE — PROGRESS NOTES
Ms. Levi is here for management of anticoagulation for Afib.   Patient was started on Warfarin on 8/6/15.   PMH also significant for colonic polyp, and vertigo.  She presents today w/out complaint.  Pt verifies dosing regimen.   No missed doses.   Pt denies s/sx bleeding/swelling/SOB.  No changes in Rx/OTC/herbal medications.    Reports that she takes 1-2 Aleve here and there as needed.  No tobacco use. She reports that she drinks one EtOH drink about every night.      INR 2.9 is within acceptable therapeutic range of 2-3  Recommend to continue dose of 5 mg Mon/Thu and 2.5 mg all other days.  Patient has Warfarin 5 mg tablets.   Will monitor and check INR in 4 weeks.   Dosing reminder card given with phone number, appointment date and time.   Return to clinic: 23 @ 10:45 am  Referring MD: Dr. David Cheng  Cardiologist : Dr Pelletier (t) [phone] - fax summary too    Rachael Styles, PharmD  1/10/2024 at 10:57 AM    For Pharmacy Admin Tracking Only    Intervention Detail: Adherence Monitorin  Total # of Interventions Recommended: 1  Total # of Interventions Accepted: 1  Time Spent (min): 15

## 2024-01-12 DIAGNOSIS — I48.0 PAF (PAROXYSMAL ATRIAL FIBRILLATION) (HCC): Primary | ICD-10-CM

## 2024-01-12 RX ORDER — WARFARIN SODIUM 5 MG/1
5 TABLET ORAL DAILY
Qty: 90 TABLET | Refills: 2 | Status: SHIPPED | OUTPATIENT
Start: 2024-01-12

## 2024-01-12 NOTE — TELEPHONE ENCOUNTER
Pt called and stated that she contact pharmacy and was advised that they had contacted our office to have warfarin (COUMADIN) 5 MG tablet refilled.  Advised that we did not see that we had received request.  Pt needs refill sent to       [facility]/pharmacy #[id number] - [place], [place] - [address] - P [phone] - F [phone]  [address] [facility] [address]  Phone: [phone]  Fax: [phone]     Pt scheduled to see npam on 1/23/2024

## 2024-01-14 DIAGNOSIS — I48.0 PAF (PAROXYSMAL ATRIAL FIBRILLATION) (HCC): ICD-10-CM

## 2024-01-15 RX ORDER — WARFARIN SODIUM 5 MG/1
TABLET ORAL
Qty: 195 TABLET | Refills: 1 | OUTPATIENT
Start: 2024-01-15

## 2024-01-22 NOTE — PROGRESS NOTES
motion abnormalities are seen.  Normal left ventricular diastolic filling pressure.  MIld tricuspid regurgitation.  Systolic pulmonary artery pressure (SPAP) is normal and estimated at 27 mmHg (right atrial pressure 3 mmHg).     Echo 8/6/2015  Left ventricle size is normal.   Moderate concentric left ventricular hypertrophy is present.   Global ejection fraction is normal and estimated from 55% to 60%.   No regional wall motion abnormalities are noted.   Diastolic filling parameters suggests normal diastolic function .   Mild thickening of leaflets of mitral valve.   Mild mitral regurgitation is present.Mild posterior mitral annulus calcification is present.No mitral stenosis.   Aortic valve appears sclerotic but opens adequately.   No evidence of aortic valve regurgitation.   Normal right ventricular size .   Left ventricular contractility appears normal But   TAPSE values reduced     Stress test 1/22/2021:  Normal LV function.    Normal wall motion    Small area of possible inferolateral ischemia            All labs and testing reviewed.  CARDIOLOGY LABS:   CBC: No results for input(s): \"WBC\", \"HGB\", \"HCT\", \"PLT\" in the last 72 hours.  BMP: No results for input(s): \"NA\", \"K\", \"CO2\", \"BUN\", \"CREATININE\", \"LABGLOM\", \"GLUCOSE\" in the last 72 hours.  PT/INR: No results for input(s): \"PROTIME\", \"INR\" in the last 72 hours.  APTT:No results for input(s): \"APTT\" in the last 72 hours.  FASTING LIPID PANEL:  Lab Results   Component Value Date/Time    HDL 43 01/13/2021 12:24 AM    LDLCALC 82 01/13/2021 12:24 AM    TRIG 67 01/13/2021 12:24 AM     LIVER PROFILE:No results for input(s): \"AST\", \"ALT\", \"ALB\" in the last 72 hours.    IMPRESSION:    Patient Active Problem List   Diagnosis    PAF (paroxysmal atrial fibrillation) (HCC)    Acidosis    Closed displaced oblique fracture of shaft of right humerus    H/O fracture of humerus    Atypical chest pain    Abnormal stress test    Dizziness       Assessment:   Symptomatic

## 2024-01-23 ENCOUNTER — OFFICE VISIT (OUTPATIENT)
Dept: CARDIOLOGY CLINIC | Age: 84
End: 2024-01-23
Payer: MEDICARE

## 2024-01-23 VITALS
HEIGHT: 63 IN | DIASTOLIC BLOOD PRESSURE: 74 MMHG | OXYGEN SATURATION: 97 % | WEIGHT: 191.8 LBS | HEART RATE: 107 BPM | BODY MASS INDEX: 33.98 KG/M2 | SYSTOLIC BLOOD PRESSURE: 118 MMHG

## 2024-01-23 DIAGNOSIS — I48.0 PAROXYSMAL ATRIAL FIBRILLATION (HCC): ICD-10-CM

## 2024-01-23 DIAGNOSIS — I48.0 PAF (PAROXYSMAL ATRIAL FIBRILLATION) (HCC): Primary | ICD-10-CM

## 2024-01-23 PROCEDURE — 93000 ELECTROCARDIOGRAM COMPLETE: CPT | Performed by: NURSE PRACTITIONER

## 2024-01-23 PROCEDURE — 1123F ACP DISCUSS/DSCN MKR DOCD: CPT | Performed by: NURSE PRACTITIONER

## 2024-01-23 PROCEDURE — 99214 OFFICE O/P EST MOD 30 MIN: CPT | Performed by: NURSE PRACTITIONER

## 2024-01-23 NOTE — PATIENT INSTRUCTIONS
NO changes today     Monitor BP and heart rate at home and call if consistently out of goal ranges

## 2024-02-07 ENCOUNTER — ANTI-COAG VISIT (OUTPATIENT)
Dept: PHARMACY | Age: 84
End: 2024-02-07
Payer: MEDICARE

## 2024-02-07 DIAGNOSIS — I48.0 PAF (PAROXYSMAL ATRIAL FIBRILLATION) (HCC): Primary | ICD-10-CM

## 2024-02-07 LAB — INTERNATIONAL NORMALIZATION RATIO, POC: 2.1

## 2024-02-07 PROCEDURE — 85610 PROTHROMBIN TIME: CPT | Performed by: INTERNAL MEDICINE

## 2024-02-07 PROCEDURE — 99211 OFF/OP EST MAY X REQ PHY/QHP: CPT | Performed by: INTERNAL MEDICINE

## 2024-02-07 NOTE — PROGRESS NOTES
Ms. Levi is here for management of anticoagulation for Afib.   Patient was started on Warfarin on 8/6/15.   PMH also significant for colonic polyp, and vertigo.  She presents today w/out complaint.  Pt verifies dosing regimen.   No missed doses.   Pt denies s/sx bleeding/swelling/SOB.  No changes in Rx/OTC/herbal medications.    Reports that she takes 1-2 Aleve here and there as needed.  No tobacco use. She reports that she drinks one EtOH drink about every night.      INR 2.1 is within acceptable therapeutic range of 2-3  Recommend to continue dose of 5 mg Mon/Thu and 2.5 mg all other days.  Patient has Warfarin 5 mg tablets.   Will monitor and check INR in 4 weeks.   Dosing reminder card given with phone number, appointment date and time.   Return to clinic: 3/6/23 @ 11 am    Referring MD: Dr. David Cheng  Cardiologist : Dr Pelletier ([phone]) [phone] - fax summary too  Referral date- faxed      For Pharmacy Admin Tracking Only    Intervention Detail: Adherence Monitorin  Total # of Interventions Recommended: 1  Total # of Interventions Accepted: 1  Time Spent (min): 15

## 2024-03-06 ENCOUNTER — ANTI-COAG VISIT (OUTPATIENT)
Dept: PHARMACY | Age: 84
End: 2024-03-06
Payer: MEDICARE

## 2024-03-06 DIAGNOSIS — I48.0 PAF (PAROXYSMAL ATRIAL FIBRILLATION) (HCC): Primary | ICD-10-CM

## 2024-03-06 LAB — INTERNATIONAL NORMALIZATION RATIO, POC: 2.1

## 2024-03-06 PROCEDURE — 85610 PROTHROMBIN TIME: CPT | Performed by: INTERNAL MEDICINE

## 2024-03-06 PROCEDURE — 99211 OFF/OP EST MAY X REQ PHY/QHP: CPT | Performed by: INTERNAL MEDICINE

## 2024-03-06 NOTE — PROGRESS NOTES
Ms. Levi is here for management of anticoagulation for Afib.   Patient was started on Warfarin on 8/6/15.   PMH also significant for colonic polyp, and vertigo.  She presents today w/out complaint.  Pt verifies dosing regimen.   No missed doses.   Pt denies s/sx bleeding/swelling/SOB.  No changes in Rx/OTC/herbal medications.    Reports that she takes 1-2 Aleve here and there as needed.  No tobacco use. She reports that she drinks one EtOH drink about every night.      INR 2.1 is within acceptable therapeutic range of 2-3  Recommend to continue dose of 5 mg Mon/Thu and 2.5 mg all other days.  Patient has Warfarin 5 mg tablets.   Will monitor and check INR in 4 weeks.   Dosing reminder card given with phone number, appointment date and time.   Return to clinic: 4/3/23 @ 11 am    Referring MD: Nayely Maurer CNP  Referral date- faxed      For Pharmacy Admin Tracking Only    Intervention Detail: Adherence Monitorin  Total # of Interventions Recommended: 1  Total # of Interventions Accepted: 1  Time Spent (min): 15

## 2024-03-11 ENCOUNTER — TELEPHONE (OUTPATIENT)
Dept: FAMILY MEDICINE CLINIC | Age: 84
End: 2024-03-11

## 2024-03-11 DIAGNOSIS — I48.0 PAF (PAROXYSMAL ATRIAL FIBRILLATION) (HCC): ICD-10-CM

## 2024-03-11 RX ORDER — DILTIAZEM HYDROCHLORIDE 120 MG/1
CAPSULE, EXTENDED RELEASE ORAL
Qty: 90 CAPSULE | Refills: 3 | Status: SHIPPED | OUTPATIENT
Start: 2024-03-11

## 2024-03-14 ENCOUNTER — TELEPHONE (OUTPATIENT)
Dept: FAMILY MEDICINE CLINIC | Age: 84
End: 2024-03-14

## 2024-04-03 ENCOUNTER — ANTI-COAG VISIT (OUTPATIENT)
Dept: PHARMACY | Age: 84
End: 2024-04-03
Payer: MEDICARE

## 2024-04-03 DIAGNOSIS — I48.0 PAF (PAROXYSMAL ATRIAL FIBRILLATION) (HCC): Primary | ICD-10-CM

## 2024-04-03 LAB — INR BLD: 2.3

## 2024-04-03 PROCEDURE — 85610 PROTHROMBIN TIME: CPT | Performed by: FAMILY MEDICINE

## 2024-04-03 PROCEDURE — 99211 OFF/OP EST MAY X REQ PHY/QHP: CPT | Performed by: FAMILY MEDICINE

## 2024-04-03 NOTE — PROGRESS NOTES
Ms. Levi is here for management of anticoagulation for Afib.   Patient was started on Warfarin on 8/6/15.   PMH also significant for colonic polyp, and vertigo.  She presents today w/out complaint.  Pt verifies dosing regimen.   No missed doses.   Pt denies s/sx bleeding/swelling/SOB.  No changes in Rx/OTC/herbal medications.    Reports that she takes 1-2 Aleve here and there as needed.  No tobacco use. She reports that she drinks one EtOH drink about every night.    No changes    INR 2.3 is within acceptable therapeutic range of 2-3  Recommend to continue dose of 5 mg Mon/Thu and 2.5 mg all other days.  Patient has Warfarin 5 mg tablets.   Will monitor and check INR in 4 weeks.   Dosing reminder card given with phone number, appointment date and time.   Return to clinic: 4/3/23 @ 11 am    Referring MD: Nayely Maurer CNP  Referral date- 3/6/24     For Pharmacy Admin Tracking Only    Intervention Detail: Adherence Monitorin  Total # of Interventions Recommended: 1  Total # of Interventions Accepted: 1  Time Spent (min): 15

## 2024-05-08 ENCOUNTER — ANTI-COAG VISIT (OUTPATIENT)
Dept: PHARMACY | Age: 84
End: 2024-05-08
Payer: MEDICARE

## 2024-05-08 DIAGNOSIS — I48.0 PAF (PAROXYSMAL ATRIAL FIBRILLATION) (HCC): Primary | ICD-10-CM

## 2024-05-08 LAB — INTERNATIONAL NORMALIZATION RATIO, POC: 1.6

## 2024-05-08 PROCEDURE — 99211 OFF/OP EST MAY X REQ PHY/QHP: CPT

## 2024-05-08 PROCEDURE — 85610 PROTHROMBIN TIME: CPT

## 2024-05-08 NOTE — PROGRESS NOTES
Ms. Levi is here for management of anticoagulation for Afib.   Patient was started on Warfarin on 8/6/15.   PMH also significant for colonic polyp, and vertigo.  She presents today w/out complaint.  Pt verifies dosing regimen.   No missed doses.   Pt denies s/sx bleeding/swelling/SOB.  No changes in Rx/OTC/herbal medications.    Reports that she takes 1-2 Aleve here and there as needed.  No tobacco use. She reports that she drinks one EtOH drink about every night.    No changes, not sure why INR is so low today  Patient denies missed dose or extra vitamin K intake.    INR 1.6 is below the acceptable therapeutic range of 2-3  Recommend to increase warfarin dose to 5 mg Mon/Wed/Fri and 2.5 mg all other days.  Patient has Warfarin 5 mg tablets.   Will monitor and check INR in 2 weeks.   Dosing reminder card given with phone number, appointment date and time.   Return to clinic: 23 @ 11 am    Referring MD: Nayely Maurer, CNP  Referral date- 3/6/24     Rachael Styles, PharmD  2024 at 1:28 PM      For Pharmacy Admin Tracking Only    Intervention Detail: Adherence Monitorin and Dose Adjustment: 1, reason: Therapy Optimization  Total # of Interventions Recommended: 2  Total # of Interventions Accepted: 2  Time Spent (min): 15

## 2024-05-24 ENCOUNTER — ANTI-COAG VISIT (OUTPATIENT)
Dept: PHARMACY | Age: 84
End: 2024-05-24
Payer: MEDICARE

## 2024-05-24 DIAGNOSIS — I48.0 PAF (PAROXYSMAL ATRIAL FIBRILLATION) (HCC): Primary | ICD-10-CM

## 2024-05-24 LAB — INR BLD: 3.1

## 2024-05-24 PROCEDURE — 99211 OFF/OP EST MAY X REQ PHY/QHP: CPT

## 2024-05-24 PROCEDURE — 85610 PROTHROMBIN TIME: CPT

## 2024-05-24 NOTE — PROGRESS NOTES
Ms. Levi is here for management of anticoagulation for Afib.   Patient was started on Warfarin on 8/6/15.   PMH also significant for colonic polyp, and vertigo.  She presents today w/out complaint.  Pt verifies dosing regimen.   No missed doses.   Pt denies s/sx bleeding/swelling/SOB.  No changes in Rx/OTC/herbal medications.    Reports that she takes 1-2 Aleve here and there as needed.  No tobacco use. She reports that she drinks one EtOH drink about every night.    No changes, not sure why INR is so low today  Patient denies missed dose or extra vitamin K intake.    INR 3.1 IS WITHIN the acceptable therapeutic range of 2-3  Recommend to CONTINUE 5 mg Mon/Wed/Fri and 2.5 mg all other days.  Patient has Warfarin 5 mg tablets.   Will monitor and check INR in 4 weeks.   Dosing reminder card given with phone number, appointment date and time.   Return to clinic: 6/21/23 @ 11 am    Referring MD: Nayely Maurer CNP  Referral date- 3/6/24       For Pharmacy Admin Tracking Only    Intervention Detail:   Total # of Interventions Recommended: 0  Total # of Interventions Accepted: 0  Time Spent (min): 15

## 2024-06-21 ENCOUNTER — ANTI-COAG VISIT (OUTPATIENT)
Dept: PHARMACY | Age: 84
End: 2024-06-21
Payer: MEDICARE

## 2024-06-21 DIAGNOSIS — I48.0 PAF (PAROXYSMAL ATRIAL FIBRILLATION) (HCC): Primary | ICD-10-CM

## 2024-06-21 LAB — INR BLD: 2.8

## 2024-06-21 PROCEDURE — 85610 PROTHROMBIN TIME: CPT

## 2024-06-21 PROCEDURE — 99211 OFF/OP EST MAY X REQ PHY/QHP: CPT

## 2024-06-21 NOTE — PROGRESS NOTES
Ms. Levi is here for management of anticoagulation for Afib.   Patient was started on Warfarin on 8/6/15.   PMH also significant for colonic polyp, and vertigo.  She presents today w/out complaint.  Pt verifies dosing regimen.   No missed doses.   Pt denies s/sx bleeding/swelling/SOB.  No changes in Rx/OTC/herbal medications.    Reports that she takes 1-2 Aleve here and there as needed.  No tobacco use. She reports that she drinks one EtOH drink about every night.    No changes, not sure why INR is so low today  Patient denies missed dose or extra vitamin K intake.    INR 2.8 IS WITHIN the acceptable therapeutic range of 2-3  Recommend to CONTINUE 5 mg Mon/Wed/Fri and 2.5 mg all other days.  Patient has Warfarin 5 mg tablets.   Will monitor and check INR in 4 weeks.   Dosing reminder card given with phone number, appointment date and time.   Return to clinic: 7/19 /23 @ 1130    Referring MD: Nayely Maurer CNP  Referral date- 3/6/24     For Pharmacy Admin Tracking Only    Intervention Detail:   Total # of Interventions Recommended: 0  Total # of Interventions Accepted: 0  Time Spent (min): 15

## 2024-07-19 ENCOUNTER — ANTI-COAG VISIT (OUTPATIENT)
Dept: PHARMACY | Age: 84
End: 2024-07-19
Payer: MEDICARE

## 2024-07-19 DIAGNOSIS — I48.0 PAF (PAROXYSMAL ATRIAL FIBRILLATION) (HCC): Primary | ICD-10-CM

## 2024-07-19 LAB — INR BLD: 2

## 2024-07-19 PROCEDURE — 99211 OFF/OP EST MAY X REQ PHY/QHP: CPT

## 2024-07-19 PROCEDURE — 85610 PROTHROMBIN TIME: CPT

## 2024-07-19 NOTE — PROGRESS NOTES
Ms. Levi is here for management of anticoagulation for Afib.   Patient was started on Warfarin on 8/6/15.   PMH also significant for colonic polyp, and vertigo.  She presents today w/out complaint.  Pt verifies dosing regimen.   No missed doses.   Pt denies s/sx bleeding/swelling/SOB.  No changes in Rx/OTC/herbal medications.    Reports that she takes 1-2 Aleve here and there as needed.  No tobacco use. She reports that she drinks one EtOH drink about every night.      INR 2.0 IS WITHIN the acceptable therapeutic range of 2-3  Recommend to CONTINUE 5 mg Mon/Wed/Fri and 2.5 mg all other days.  Patient has Warfarin 5 mg tablets.   Will monitor and check INR in 4 weeks.   Dosing reminder card given with phone number, appointment date and time.   Return to clinic: 7/19 /23 @ 9826    Referring MD: Nayely Mauerr CNP  Referral date- 3/6/24     For Pharmacy Admin Tracking Only    Intervention Detail:   Total # of Interventions Recommended: 0  Total # of Interventions Accepted: 0  Time Spent (min): 15

## 2024-08-16 ENCOUNTER — ANTI-COAG VISIT (OUTPATIENT)
Dept: PHARMACY | Age: 84
End: 2024-08-16
Payer: MEDICARE

## 2024-08-16 DIAGNOSIS — I48.0 PAF (PAROXYSMAL ATRIAL FIBRILLATION) (HCC): Primary | ICD-10-CM

## 2024-08-16 LAB
INTERNATIONAL NORMALIZATION RATIO, POC: 3.2
PROTHROMBIN TIME, POC: 0

## 2024-08-16 PROCEDURE — 85610 PROTHROMBIN TIME: CPT

## 2024-08-16 PROCEDURE — 99211 OFF/OP EST MAY X REQ PHY/QHP: CPT

## 2024-08-16 NOTE — PROGRESS NOTES
Ms. Levi is here for management of anticoagulation for Afib.   Patient was started on Warfarin on 8/6/15.   PMH also significant for colonic polyp, and vertigo.  She presents today w/out complaint.  Pt verifies dosing regimen.   No missed doses.   Pt denies s/sx bleeding/swelling/SOB.  No changes in Rx/OTC/herbal medications.    Reports that she takes 1-2 Aleve here and there as needed.  No tobacco use. She reports that she drinks one EtOH drink about every night.      INR 3.2 is above the acceptable therapeutic range of 2-3  Recommend to hold dose today then CONTINUE 5 mg Mon/Wed/Fri and 2.5 mg all other days.  Pt thinks she may have had less greens this week, she will try to make sure she gets in her usual amount.   Patient has Warfarin 5 mg tablets.   Will monitor and check INR in 2 weeks.   Dosing reminder card given with phone number, appointment date and time.   Return to clinic: 8/30/24 @ 1030    Referring MD: Nayely Maurer CNP  Referral date- 3/6/24     Benita Valverde Pharm. D.  For Pharmacy Admin Tracking Only    Intervention Detail:   Total # of Interventions Recommended: 1  Total # of Interventions Accepted: 1  Time Spent (min): 15

## 2024-08-30 ENCOUNTER — ANTI-COAG VISIT (OUTPATIENT)
Dept: PHARMACY | Age: 84
End: 2024-08-30
Payer: MEDICARE

## 2024-08-30 DIAGNOSIS — I48.0 PAF (PAROXYSMAL ATRIAL FIBRILLATION) (HCC): Primary | ICD-10-CM

## 2024-08-30 LAB
INTERNATIONAL NORMALIZATION RATIO, POC: 1.7
PROTHROMBIN TIME, POC: 0

## 2024-08-30 PROCEDURE — 99211 OFF/OP EST MAY X REQ PHY/QHP: CPT | Performed by: PHARMACIST

## 2024-08-30 PROCEDURE — 85610 PROTHROMBIN TIME: CPT | Performed by: PHARMACIST

## 2024-08-30 NOTE — PROGRESS NOTES
Ms. Levi is here for management of anticoagulation for Afib.   Patient was started on Warfarin on 8/6/15.   PMH also significant for colonic polyp, and vertigo.  She presents today w/out complaint.  Pt verifies dosing regimen.   No missed doses.   Pt denies s/sx bleeding/swelling/SOB.  No changes in Rx/OTC/herbal medications.    Reports that she takes 1-2 Aleve here and there as needed.  No tobacco use. She reports that she drinks one EtOH drink about every night.    Has been eating a lot of green recently, would explained her low INR today  Educated and recommended patient on consistent green diet (INR was high last visit even though warfarin dose was kept the same)    INR 1.7 is below the acceptable therapeutic range of 2-3  Recommend 7.5 mg today only and to continue 5 mg Mon/Wed/Fri and 2.5 mg all other days. Educated on consistent diet.  Patient has Warfarin 5 mg tablets.   Will monitor and check INR in 3 weeks.   Dosing reminder card given with phone number, appointment date and time.   Return to clinic:  @ 1041    Referring MD: Nayely Maurer CNP  Referral date- 3/6/24     Rachael Styles, PharmD  2024 at 12:53 PM      For Pharmacy Admin Tracking Only    Intervention Detail: Adherence Monitorin and Dose Adjustment: 1, reason: Therapy Optimization  Total # of Interventions Recommended: 2  Total # of Interventions Accepted: 2  Time Spent (min): 15

## 2024-09-20 ENCOUNTER — ANTI-COAG VISIT (OUTPATIENT)
Dept: PHARMACY | Age: 84
End: 2024-09-20
Payer: MEDICARE

## 2024-09-20 DIAGNOSIS — I48.0 PAF (PAROXYSMAL ATRIAL FIBRILLATION) (HCC): Primary | ICD-10-CM

## 2024-09-20 LAB
INTERNATIONAL NORMALIZATION RATIO, POC: 2.3
PROTHROMBIN TIME, POC: 0

## 2024-09-20 PROCEDURE — 99211 OFF/OP EST MAY X REQ PHY/QHP: CPT

## 2024-09-20 PROCEDURE — 85610 PROTHROMBIN TIME: CPT

## 2024-10-18 ENCOUNTER — ANTI-COAG VISIT (OUTPATIENT)
Dept: PHARMACY | Age: 84
End: 2024-10-18
Payer: MEDICARE

## 2024-10-18 DIAGNOSIS — I48.0 PAF (PAROXYSMAL ATRIAL FIBRILLATION) (HCC): Primary | ICD-10-CM

## 2024-10-18 LAB
INTERNATIONAL NORMALIZATION RATIO, POC: 2.4
PROTHROMBIN TIME, POC: 0

## 2024-10-18 PROCEDURE — 85610 PROTHROMBIN TIME: CPT

## 2024-10-18 PROCEDURE — 99211 OFF/OP EST MAY X REQ PHY/QHP: CPT

## 2024-10-18 NOTE — PROGRESS NOTES
Ms. Levi is here for management of anticoagulation for Afib. Patient was started on Warfarin on 8/6/15. PMH also significant for colonic polyp, and vertigo. She presents today w/out complaint.    Pt verifies dosing regimen.   Pt denies missed/extra doses.   Pt denies s/sx bleeding/bruising/swelling/SOB/CP.  Pt denies changes in Rx/OTC/herbal medications.    - Reports that she takes 1-2 Aleve here and there as needed.  Pt denies dietary changes  No tobacco use. She reports that she drinks one EtOH drink about every night.    INR 2.4 is WITHIN acceptable therapeutic range of 2-3.  Recommend  to continue 5 mg Mon/Wed/Fri and 2.5 mg all other days.  Patient has Warfarin 5 mg tablets.   Will monitor and check INR in 4 weeks.   Dosing reminder card given with phone number, appointment date and time.   Return to clinic: 11/15/24 @ 1045    Referring MD: Nayely Maurer CNP  Referral date- 3/6/24     David Gomez PharmD 10/18/2024 10:55 AM    For Pharmacy Admin Tracking Only  Intervention Detail:   Total # of Interventions Recommended: 0  Total # of Interventions Accepted: 0  Time Spent (min): 15

## 2024-11-15 ENCOUNTER — ANTI-COAG VISIT (OUTPATIENT)
Dept: PHARMACY | Age: 84
End: 2024-11-15
Payer: MEDICARE

## 2024-11-15 DIAGNOSIS — I48.0 PAF (PAROXYSMAL ATRIAL FIBRILLATION) (HCC): Primary | ICD-10-CM

## 2024-11-15 LAB
INTERNATIONAL NORMALIZATION RATIO, POC: 2.4
PROTHROMBIN TIME, POC: 0

## 2024-11-15 PROCEDURE — 85610 PROTHROMBIN TIME: CPT

## 2024-11-15 PROCEDURE — 99211 OFF/OP EST MAY X REQ PHY/QHP: CPT

## 2024-11-15 NOTE — PROGRESS NOTES
Ms. Levi is here for management of anticoagulation for Afib. Patient was started on Warfarin on 8/6/15. PMH also significant for colonic polyp, and vertigo. She presents today w/out complaint.    Pt verifies dosing regimen.   Pt denies missed/extra doses.   Pt denies s/sx bleeding/bruising/swelling/SOB/CP.  Pt denies changes in Rx/OTC/herbal medications.    Pt denies dietary changes  No tobacco use. She reports that she drinks one EtOH drink about every night.    INR 2.4 is WITHIN acceptable therapeutic range of 2-3.  Recommend  to continue 5 mg Mon/Wed/Fri and 2.5 mg all other days.  Patient has Warfarin 5 mg tablets.   Will monitor and check INR in 4 weeks.   Dosing reminder card given with phone number, appointment date and time.   Return to clinic: 12/13/24 at 10:45    Referring MD: Nayely Maurer CNP  Referral date- 3/6/24     David Gomez, PharmD 11/15/2024 10:49 AM    For Pharmacy Admin Tracking Only  Intervention Detail:   Total # of Interventions Recommended: 0  Total # of Interventions Accepted: 0  Time Spent (min): 15

## 2024-12-18 ENCOUNTER — ANTI-COAG VISIT (OUTPATIENT)
Dept: PHARMACY | Age: 84
End: 2024-12-18
Payer: MEDICARE

## 2024-12-18 DIAGNOSIS — I48.0 PAF (PAROXYSMAL ATRIAL FIBRILLATION) (HCC): Primary | ICD-10-CM

## 2024-12-18 LAB
INTERNATIONAL NORMALIZATION RATIO, POC: 2.4
PROTHROMBIN TIME, POC: 0

## 2024-12-18 PROCEDURE — 85610 PROTHROMBIN TIME: CPT

## 2024-12-18 PROCEDURE — 99211 OFF/OP EST MAY X REQ PHY/QHP: CPT

## 2024-12-18 NOTE — PROGRESS NOTES
Ms. Levi is here for management of anticoagulation for Afib. Patient was started on Warfarin on 8/6/15. PMH also significant for colonic polyp, and vertigo.     Patient presents today w/out complaint.  Patient verifies dosing regimen.   Patient denies missed/extra doses.   Patient denies s/sx bleeding/bruising/swelling/SOB/CP.  Patient denies changes in Rx/OTC/herbal medications.    Patient denies dietary changes  Patient denies tobacco use/ one EtOH drink about every night.    INR 2.4 is WITHIN acceptable therapeutic range of 2-3.  Recommend  to continue 5 mg Mon/Wed/Fri and 2.5 mg all other days.  Patient has Warfarin 5 mg tablets.   Will monitor and check INR in 4 weeks.   Dosing reminder card given with phone number, appointment date and time.   Return to clinic: 1/15/24 at 11:15    Referring MD: Nayely Maurer CNP  Referral date- 3/6/24     David Gomez PharmD 12/18/2024 11:05 AM    For Pharmacy Admin Tracking Only  Intervention Detail:   Total # of Interventions Recommended: 0  Total # of Interventions Accepted: 0  Time Spent (min): 15

## 2025-01-15 ENCOUNTER — ANTI-COAG VISIT (OUTPATIENT)
Dept: PHARMACY | Age: 85
End: 2025-01-15
Payer: MEDICARE

## 2025-01-15 DIAGNOSIS — I48.0 PAF (PAROXYSMAL ATRIAL FIBRILLATION) (HCC): Primary | ICD-10-CM

## 2025-01-15 LAB
INTERNATIONAL NORMALIZATION RATIO, POC: 2.1
PROTHROMBIN TIME, POC: 0

## 2025-01-15 PROCEDURE — 85610 PROTHROMBIN TIME: CPT

## 2025-01-15 PROCEDURE — 99211 OFF/OP EST MAY X REQ PHY/QHP: CPT

## 2025-01-15 NOTE — PROGRESS NOTES
Ms. Levi is here for management of anticoagulation for Afib. Patient was started on Warfarin on 8/6/15. PMH also significant for colonic polyp, and vertigo.     Patient presents today w/out complaint.  Patient verifies dosing regimen.   Patient denies missed/extra doses.   Patient denies s/sx bleeding/bruising/swelling/SOB/CP.  Patient denies changes in Rx/OTC/herbal medications.    Patient denies dietary changes  Patient denies tobacco use/ one EtOH drink about every night.    INR 2.1 is WITHIN acceptable therapeutic range of 2-3.  Recommend  to continue 5 mg Mon/Wed/Fri and 2.5 mg all other days.  Patient has Warfarin 5 mg tablets.   Will monitor and check INR in 4 weeks.   Dosing reminder card given with phone number, appointment date and time.   Return to clinic: 2/12/25 at 11:15    Referring MD: Nayely Maurer CNP  Referral date- 3/6/24     David Gomez PharmD 1/15/2025 11:17 AM    For Pharmacy Admin Tracking Only  Intervention Detail:   Total # of Interventions Recommended: 0  Total # of Interventions Accepted: 0  Time Spent (min): 15

## 2025-01-18 DIAGNOSIS — I48.0 PAF (PAROXYSMAL ATRIAL FIBRILLATION) (HCC): ICD-10-CM

## 2025-01-20 NOTE — TELEPHONE ENCOUNTER
Last OV 01/23/2024  Upcoming OV 03/21/2025  INR 01/15/2025  CBC 10/2024 in care everywhere  BMP 10/2024 in care everywhere

## 2025-01-21 RX ORDER — WARFARIN SODIUM 5 MG/1
5 TABLET ORAL DAILY
Qty: 90 TABLET | Refills: 0 | Status: SHIPPED | OUTPATIENT
Start: 2025-01-21

## 2025-02-19 ENCOUNTER — ANTI-COAG VISIT (OUTPATIENT)
Dept: PHARMACY | Age: 85
End: 2025-02-19
Payer: MEDICARE

## 2025-02-19 DIAGNOSIS — I48.0 PAF (PAROXYSMAL ATRIAL FIBRILLATION) (HCC): Primary | ICD-10-CM

## 2025-02-19 LAB
INTERNATIONAL NORMALIZATION RATIO, POC: 2.7
PROTHROMBIN TIME, POC: 0

## 2025-02-19 PROCEDURE — 99211 OFF/OP EST MAY X REQ PHY/QHP: CPT

## 2025-02-19 PROCEDURE — 85610 PROTHROMBIN TIME: CPT

## 2025-02-19 NOTE — PROGRESS NOTES
Ms. Dia Levi is a 84 y.o. y/o female with history of Afib who presents today for anticoagulation monitoring and adjustment. Patient was started on Warfarin on 8/6/15    Pertinent PMH: colonic polyp, vertigo, arthritis (hips and shoulders)    Patient Reported Findings:  Yes     No  [x]   []       Patient verifies current dosing regimen as listed  - Warfarin 5 mg Mon,Wed,Fri; 2.5 mg all other days.  - Patient has Warfarin 5 mg tablets.   []   [x]       S/Sx bleeding/bruising/swelling/SOB/CP  []   [x]       Blood in urine or stool  []   [x]       Procedures scheduled in the future at this time  []   [x]       Missed Dose  []   [x]       Extra Dose  []   [x]       Change in medications  []   [x]       Change in health/diet/appetite  []   [x]       Change in alcohol use  - Patient reports ~1 EtOH drink about every night.  []   [x]       Change in activity  []   [x]       Hospital admission  []   [x]       Emergency department visit  []   [x]       Other complaints    Clinical Outcomes:  Yes     No  []   [x]       Major bleeding event  []   [x]       Thromboembolic event    INR (no units)   Date Value   07/19/2024 2.0   06/21/2024 2.8   05/24/2024 3.1   04/03/2024 2.3     INR,(POC) (no units)   Date Value   01/15/2025 2.1   12/18/2024 2.4   11/15/2024 2.4   10/18/2024 2.4     Duration of warfarin Therapy: Indefinite  INR Range:  2.0-3.0      INR 2.7 is WITHIN acceptable therapeutic range of 2-3.  Recommend  to continue 5 mg every Mon, Wed, Fri; 2.5 mg all other days  Will monitor and check INR in 4 weeks.   Dosing reminder card given with phone number, appointment date and time.   Return to clinic: 3/19/25 at 11:30    Referring MD: Nayely Maurer CNP  Referral date- 3/6/24  CPA: Signed     David Gomez PharmD 2/19/2025 9:28 AM      For Pharmacy Admin Tracking Only  Intervention Detail:   Total # of Interventions Recommended: 0  Total # of Interventions Accepted: 0  Time Spent (min): 15

## 2025-03-19 ENCOUNTER — ANTI-COAG VISIT (OUTPATIENT)
Dept: PHARMACY | Age: 85
End: 2025-03-19
Payer: MEDICARE

## 2025-03-19 DIAGNOSIS — I48.0 PAF (PAROXYSMAL ATRIAL FIBRILLATION) (HCC): Primary | ICD-10-CM

## 2025-03-19 LAB
INTERNATIONAL NORMALIZATION RATIO, POC: 2.9
PROTHROMBIN TIME, POC: 0

## 2025-03-19 PROCEDURE — 99211 OFF/OP EST MAY X REQ PHY/QHP: CPT

## 2025-03-19 PROCEDURE — 85610 PROTHROMBIN TIME: CPT

## 2025-03-19 NOTE — PROGRESS NOTES
Ms. Dia Levi is a 84 y.o. y/o female with history of Afib who presents today for anticoagulation monitoring and adjustment. Patient was started on Warfarin on 8/6/15    Pertinent PMH: colonic polyp, vertigo, arthritis (hips and shoulders)    Patient Reported Findings:  Yes     No  [x]   []       Patient verifies current dosing regimen as listed  - Warfarin 5 mg Mon,Wed,Fri; 2.5 mg all other days.  - Patient has Warfarin 5 mg tablets.   []   [x]       S/Sx bleeding/bruising/swelling/SOB/CP  []   [x]       Blood in urine or stool  []   [x]       Procedures scheduled in the future at this time  []   [x]       Missed Dose  []   [x]       Extra Dose  []   [x]       Change in medications  []   [x]       Change in health/diet/appetite  []   [x]       Change in alcohol use  - Patient reports ~1 EtOH drink about every night.  []   [x]       Change in activity  []   [x]       Hospital admission  []   [x]       Emergency department visit  []   [x]       Other complaints    Clinical Outcomes:  Yes     No  []   [x]       Major bleeding event  []   [x]       Thromboembolic event    INR (no units)   Date Value   07/19/2024 2.0   06/21/2024 2.8   05/24/2024 3.1   04/03/2024 2.3     INR,(POC) (no units)   Date Value   02/19/2025 2.7   01/15/2025 2.1   12/18/2024 2.4   11/15/2024 2.4     Duration of warfarin Therapy: Indefinite  INR Range:  2.0-3.0      INR 2.9 is WITHIN acceptable therapeutic range of 2-3.  Recommend  to continue 5 mg every Mon, Wed, Fri; 2.5 mg all other days  Will monitor and check INR in 4 weeks.   AVS printed and reviewed with patient  Return to clinic: 4/16/25 at 11:30    Referring MD: Nayely Maurer CNP  Referral date- 3/7/25  CPA: Judith Gomez PharmD 3/19/2025 11:33 AM      For Pharmacy Admin Tracking Only  Intervention Detail:   Total # of Interventions Recommended: 0  Total # of Interventions Accepted: 0  Time Spent (min): 15

## 2025-03-21 ENCOUNTER — OFFICE VISIT (OUTPATIENT)
Dept: CARDIOLOGY CLINIC | Age: 85
End: 2025-03-21

## 2025-03-21 VITALS
SYSTOLIC BLOOD PRESSURE: 134 MMHG | BODY MASS INDEX: 32.66 KG/M2 | HEART RATE: 86 BPM | HEIGHT: 63 IN | OXYGEN SATURATION: 99 % | DIASTOLIC BLOOD PRESSURE: 76 MMHG | WEIGHT: 184.3 LBS

## 2025-03-21 DIAGNOSIS — I48.0 PAROXYSMAL ATRIAL FIBRILLATION (HCC): ICD-10-CM

## 2025-03-21 DIAGNOSIS — I48.0 PAF (PAROXYSMAL ATRIAL FIBRILLATION) (HCC): Primary | ICD-10-CM

## 2025-03-21 NOTE — PROGRESS NOTES
humerus    Atypical chest pain    Abnormal stress test    Dizziness       Assessment:   Symptomatic paroxysmal atrial fibrillation:   -[id number] score 3 (age, gender)   Abnormal stress test 1/2021   -normal CT calcium score 11/2021  Vertigo   -participating in balance therapy   Osteoarthritis   Dizziness       Plan:   1. Continue Coumadin and Cardizem  2. Annual labs due 10/2025  3. Monitor BP and heart rate at home and call if consistently out of goal ranges  4. Follow up in one year or sooner if needed     JENNIFER Wade-CNP  [facility]  [phone]

## 2025-03-21 NOTE — PATIENT INSTRUCTIONS
No changes today     Normal heart rate range is  beats per minute     Monitor BP at home and call if consistently out of goal ranges    Follow up in one year or sooner if needed

## 2025-04-16 ENCOUNTER — ANTI-COAG VISIT (OUTPATIENT)
Dept: PHARMACY | Age: 85
End: 2025-04-16
Payer: MEDICARE

## 2025-04-16 DIAGNOSIS — I48.0 PAF (PAROXYSMAL ATRIAL FIBRILLATION) (HCC): Primary | ICD-10-CM

## 2025-04-16 LAB
INTERNATIONAL NORMALIZATION RATIO, POC: 2.2
PROTHROMBIN TIME, POC: 0

## 2025-04-16 PROCEDURE — 85610 PROTHROMBIN TIME: CPT

## 2025-04-16 PROCEDURE — 99211 OFF/OP EST MAY X REQ PHY/QHP: CPT

## 2025-04-16 NOTE — PROGRESS NOTES
Ms. Dia Levi is a 84 y.o. y/o female with history of Afib who presents today for anticoagulation monitoring and adjustment. Patient was started on Warfarin on 8/6/15    Pertinent PMH: colonic polyp, vertigo, arthritis (hips and shoulders)    Patient Reported Findings:  Yes     No  [x]   []       Patient verifies current dosing regimen as listed  - Warfarin 5 mg Mon,Wed,Fri; 2.5 mg all other days.  - Patient has Warfarin 5 mg tablets.   []   [x]       S/Sx bleeding/bruising/swelling/SOB/CP  []   [x]       Blood in urine or stool  []   [x]       Procedures scheduled in the future at this time  []   [x]       Missed Dose  []   [x]       Extra Dose  []   [x]       Change in medications  []   [x]       Change in health/diet/appetite  []   [x]       Change in alcohol use  - Patient reports ~1 EtOH drink about every night.  []   [x]       Change in activity  []   [x]       Hospital admission  []   [x]       Emergency department visit  []   [x]       Other complaints    Clinical Outcomes:  Yes     No  []   [x]       Major bleeding event  []   [x]       Thromboembolic event    INR (no units)   Date Value   07/19/2024 2.0   06/21/2024 2.8   05/24/2024 3.1   04/03/2024 2.3     INR,(POC) (no units)   Date Value   03/19/2025 2.9   02/19/2025 2.7   01/15/2025 2.1   12/18/2024 2.4     Duration of warfarin Therapy: Indefinite  INR Range:  2.0-3.0      INR 2.2 is WITHIN acceptable therapeutic range of 2-3.  Recommend  to continue 5 mg every Mon, Wed, Fri; 2.5 mg all other days  Will monitor and check INR in 4 weeks.   AVS printed and reviewed with patient  Return to clinic: 5/14/2025    Referring MD: Nayely Maurer CNP  Referral date- 3/7/25  CPA: Signed     David Gomez, [place] 4/16/2025 11:36 AM          For Pharmacy Admin Tracking Only  Intervention Detail:   Total # of Interventions Recommended: 0  Total # of Interventions Accepted: 0  Time Spent (min): 15

## 2025-04-20 DIAGNOSIS — I48.0 PAF (PAROXYSMAL ATRIAL FIBRILLATION) (HCC): ICD-10-CM

## 2025-04-21 RX ORDER — WARFARIN SODIUM 5 MG/1
5 TABLET ORAL DAILY
Qty: 60 TABLET | Refills: 0 | Status: SHIPPED | OUTPATIENT
Start: 2025-04-21

## 2025-04-24 DIAGNOSIS — I48.0 PAF (PAROXYSMAL ATRIAL FIBRILLATION) (HCC): ICD-10-CM

## 2025-04-24 NOTE — TELEPHONE ENCOUNTER
Last Office Visit: 3/21/2025 Provider: RISHI      Next Office Visit: NONE Provider: RISHI  **If no OV, when does pt need to be seen? in 1 year(s)      Encounter provider correct? Yes  Script changes since last refill? no    LAST LABS:      [id number]  **Care Everywhere? yes - BMP 10/01/2024

## 2025-04-25 RX ORDER — DILTIAZEM HYDROCHLORIDE 120 MG/1
CAPSULE, EXTENDED RELEASE ORAL DAILY
Qty: 90 CAPSULE | Refills: 3 | Status: SHIPPED | OUTPATIENT
Start: 2025-04-25

## 2025-05-23 ENCOUNTER — ANTI-COAG VISIT (OUTPATIENT)
Dept: PHARMACY | Age: 85
End: 2025-05-23
Payer: MEDICARE

## 2025-05-23 DIAGNOSIS — I48.0 PAF (PAROXYSMAL ATRIAL FIBRILLATION) (HCC): Primary | ICD-10-CM

## 2025-05-23 LAB
INTERNATIONAL NORMALIZATION RATIO, POC: 2.4
PROTHROMBIN TIME, POC: 0

## 2025-05-23 PROCEDURE — 99211 OFF/OP EST MAY X REQ PHY/QHP: CPT

## 2025-05-23 PROCEDURE — 85610 PROTHROMBIN TIME: CPT

## 2025-05-23 NOTE — PROGRESS NOTES
Ms. Dia Levi is a 84 y.o. y/o female with history of Afib who presents today for anticoagulation monitoring and adjustment. Patient was started on Warfarin on 8/6/15    Pertinent PMH: colonic polyp, vertigo, arthritis (hips and shoulders)    Patient Reported Findings:  Yes     No  [x]   []       Patient verifies current dosing regimen as listed  - Warfarin 5 mg Mon,Wed,Fri; 2.5 mg all other days.  - Patient has Warfarin 5 mg tablets.   []   [x]       S/Sx bleeding/bruising/swelling/SOB/CP  []   [x]       Blood in urine or stool  []   [x]       Procedures scheduled in the future at this time  []   [x]       Missed Dose  []   [x]       Extra Dose  []   [x]       Change in medications  []   [x]       Change in health/diet/appetite  []   [x]       Change in alcohol use  - Patient reports ~1 EtOH drink about every night.  []   [x]       Change in activity  []   [x]       Hospital admission  []   [x]       Emergency department visit  []   [x]       Other complaints    Clinical Outcomes:  Yes     No  []   [x]       Major bleeding event  []   [x]       Thromboembolic event    INR (no units)   Date Value   07/19/2024 2.0   06/21/2024 2.8   05/24/2024 3.1   04/03/2024 2.3     INR,(POC) (no units)   Date Value   04/16/2025 2.2   03/19/2025 2.9   02/19/2025 2.7   01/15/2025 2.1     Duration of warfarin Therapy: Indefinite  INR Range:  2.0-3.0      INR 2.2 is WITHIN acceptable therapeutic range of 2-3.  Recommend  to continue 5 mg every Mon, Wed, Fri; 2.5 mg all other days  Will monitor and check INR in 4 weeks.   AVS printed and reviewed with patient  Return to clinic: 6/20/2025    Referring MD: Nayely Maurer CNP  Referral date- 3/7/25  CPA: Signed     David Gomez, [place] 5/23/2025 11:30 AM          For Pharmacy Admin Tracking Only  Intervention Detail:   Total # of Interventions Recommended: 0  Total # of Interventions Accepted: 0  Time Spent (min): 15

## 2025-06-19 NOTE — PROGRESS NOTES
Ms. Dia Levi is a 84 y.o. y/o female with history of Afib who presents today for anticoagulation monitoring and adjustment. Patient was started on Warfarin on 8/6/15    Pertinent PMH: colonic polyp, vertigo, arthritis (hips and shoulders)    Patient Reported Findings:  Yes     No  [x]   []       Patient verifies current dosing regimen as listed  - Warfarin 5 mg Mon,Wed,Fri; 2.5 mg all other days.  - Patient has Warfarin 5 mg tablets.   []   [x]       S/Sx bleeding/bruising/swelling/SOB/CP  []   [x]       Blood in urine or stool  []   [x]       Procedures scheduled in the future at this time  []   [x]       Missed Dose  []   [x]       Extra Dose  []   [x]       Change in medications  []   [x]       Change in health/diet/appetite  []   [x]       Change in alcohol use  - Patient reports ~1 EtOH drink about every night.  []   [x]       Change in activity  []   [x]       Hospital admission  []   [x]       Emergency department visit  []   [x]       Other complaints    Clinical Outcomes:  Yes     No  []   [x]       Major bleeding event  []   [x]       Thromboembolic event    INR (no units)   Date Value   07/19/2024 2.0   06/21/2024 2.8   05/24/2024 3.1   04/03/2024 2.3     INR,(POC) (no units)   Date Value   05/23/2025 2.4   04/16/2025 2.2   03/19/2025 2.9   02/19/2025 2.7     Duration of warfarin Therapy: Indefinite  INR Range:  2.0-3.0      INR 2.8 is WITHIN acceptable therapeutic range of 2-3.  Recommend  to continue 5 mg every Mon, Wed, Fri; 2.5 mg all other days  Will monitor and check INR in 4 weeks.   AVS printed and reviewed with patient  Return to clinic: 6/20/2025    Referring MD: Nayely Maurer CNP  Referral date- 3/7/25  CPA: Judith Gomez, [place] 6/19/2025 11:03 AM          For Pharmacy Admin Tracking Only  Intervention Detail:   Total # of Interventions Recommended: 0  Total # of Interventions Accepted: 0  Time Spent (min): 15

## 2025-06-27 ENCOUNTER — ANTI-COAG VISIT (OUTPATIENT)
Dept: PHARMACY | Age: 85
End: 2025-06-27
Payer: MEDICARE

## 2025-06-27 DIAGNOSIS — I48.0 PAF (PAROXYSMAL ATRIAL FIBRILLATION) (HCC): Primary | ICD-10-CM

## 2025-06-27 LAB
INTERNATIONAL NORMALIZATION RATIO, POC: 2.8
PROTHROMBIN TIME, POC: 0

## 2025-06-27 PROCEDURE — 85610 PROTHROMBIN TIME: CPT

## 2025-06-27 PROCEDURE — 99211 OFF/OP EST MAY X REQ PHY/QHP: CPT

## 2025-08-06 ENCOUNTER — ANTI-COAG VISIT (OUTPATIENT)
Dept: PHARMACY | Age: 85
End: 2025-08-06
Payer: MEDICARE

## 2025-08-06 DIAGNOSIS — I48.0 PAF (PAROXYSMAL ATRIAL FIBRILLATION) (HCC): Primary | ICD-10-CM

## 2025-08-06 LAB
INTERNATIONAL NORMALIZATION RATIO, POC: 2.1
PROTHROMBIN TIME, POC: 0

## 2025-08-06 PROCEDURE — 99211 OFF/OP EST MAY X REQ PHY/QHP: CPT

## 2025-08-06 PROCEDURE — 85610 PROTHROMBIN TIME: CPT

## 2025-08-15 DIAGNOSIS — I48.0 PAF (PAROXYSMAL ATRIAL FIBRILLATION) (HCC): ICD-10-CM

## 2025-08-18 RX ORDER — WARFARIN SODIUM 5 MG/1
5 TABLET ORAL DAILY
Qty: 30 TABLET | Refills: 3 | Status: SHIPPED | OUTPATIENT
Start: 2025-08-18

## (undated) DEVICE — SCREW BNE L16MM DIA3.5MM CORT S STL ST NONCANNULATED LOK
Type: IMPLANTABLE DEVICE | Status: NON-FUNCTIONAL
Removed: 2020-10-20

## (undated) DEVICE — SCREW BNE L18MM DIA2.7MM MTPHSEAL S STL ST FULL THRD T8
Type: IMPLANTABLE DEVICE | Status: NON-FUNCTIONAL
Removed: 2020-10-20

## (undated) DEVICE — BANDAGE COMPR W4INXL9FT EXSANG SGL LAYERED NO CLSR ESMARCH

## (undated) DEVICE — GLOVE SURG SZ 75 L12IN FNGR THK94MIL STD WHT LTX FREE

## (undated) DEVICE — PLATE BNE L69MM 6 H S STL 1/3 TBLR LOK COMPR W/ CLLR FOR
Type: IMPLANTABLE DEVICE | Status: NON-FUNCTIONAL
Removed: 2020-10-20

## (undated) DEVICE — COMFO-TEX ELASTIC BANDAGE LATEX FREE, 4INX5YD: Brand: COMFO-TEX™

## (undated) DEVICE — SUTURE VCRL SZ 2-0 L18IN ABSRB UD CT-1 L36MM 1/2 CIR J839D

## (undated) DEVICE — SCREW CORTX SLFTP FTHRD 3.5X18MM
Type: IMPLANTABLE DEVICE | Status: NON-FUNCTIONAL
Removed: 2020-10-20

## (undated) DEVICE — CONVERTED USE 304929 SPONGE GAUZE CURITY 4X4IN 16-PLY ST

## (undated) DEVICE — QUILTED PREMIUM COMFORT UNDERPAD,EXTRA HEAVY: Brand: WINGS

## (undated) DEVICE — ELECTRODE PT RET AD L9FT HI MOIST COND ADH HYDRGEL CORDED

## (undated) DEVICE — PENCIL ES L3M BTTN SWCH S STL HEX LOK BLDE ELECTRD HOLSTER

## (undated) DEVICE — DRAPE,U/ SHT,SPLIT,PLAS,STERIL: Brand: MEDLINE

## (undated) DEVICE — DRESSING,GAUZE,XEROFORM,CURAD,1"X8",ST: Brand: CURAD

## (undated) DEVICE — 3M™ IOBAN™ 2 ANTIMICROBIAL INCISE DRAPE 6650EZ: Brand: IOBAN™ 2

## (undated) DEVICE — TOTAL TRAY, DB, 100% SILI FOLEY, 16FR 10: Brand: MEDLINE

## (undated) DEVICE — SCREW BNE L28MM DIA2.7MM S STL ST VAR ANG LOK FULL THRD T8
Type: IMPLANTABLE DEVICE | Status: NON-FUNCTIONAL
Removed: 2020-10-20

## (undated) DEVICE — PLATE BNE L93MM 8 H S STL 1/3 TBLR LOK COMPR W/ CLLR FOR
Type: IMPLANTABLE DEVICE | Status: NON-FUNCTIONAL
Removed: 2020-10-20

## (undated) DEVICE — SYRINGE BLB 50CC IRRIG PLIABLE FNGR FLNG GRAD FLSK DISP

## (undated) DEVICE — 3.5MM X 45.0MM CORTICAL SCREW
Type: IMPLANTABLE DEVICE | Status: NON-FUNCTIONAL
Brand: ACUMED
Removed: 2020-10-20

## (undated) DEVICE — GLOVE SURG SZ 65 L12IN FNGR THK94MIL STD WHT LTX FREE

## (undated) DEVICE — CONVERTORS STOCKINETTE: Brand: CONVERTORS

## (undated) DEVICE — PAD,ABDOMINAL,8"X10",ST,LF: Brand: MEDLINE

## (undated) DEVICE — BIT DRL L110MM DIA2.5MM G QUIK CPL W/O STP REUSE

## (undated) DEVICE — GAUZE,SPONGE,4"X4",16PLY,STRL,LF,10/TRAY: Brand: MEDLINE

## (undated) DEVICE — FLUID CONTROL SHOULDER DRAPE PACK: Brand: CONVERTORS

## (undated) DEVICE — SUTURE VCRL + SZ 2-0 L36IN ABSRB UD L36MM CT-1 1/2 CIR VCP945H

## (undated) DEVICE — SMARTGOWN BREATHABLE SURGICAL GOWN: Brand: CONVERTORS

## (undated) DEVICE — 3M™ COBAN™ NL STERILE NON-LATEX SELF-ADHERENT WRAP, 2084S, 4 IN X 5 YD (10 CM X 4,5 M), 18 ROLLS/CASE: Brand: 3M™ COBAN™

## (undated) DEVICE — Device: Brand: PILLOW, GENTLETOUCH, 7 INCH RT

## (undated) DEVICE — SPONGE LAP W18XL18IN WHT COT 4 PLY FLD STRUNG RADPQ DISP ST

## (undated) DEVICE — SHEET,DRAPE,53X77,STERILE: Brand: MEDLINE

## (undated) DEVICE — GLOVE SURG SZ 8 L12IN FNGR THK94MIL STD WHT LTX FREE

## (undated) DEVICE — DRAPE C ARM W46XL120IN XLN

## (undated) DEVICE — SCREW BNE L24MM DIA2.7MM MTPHSEAL S STL ST FULL THRD T8
Type: IMPLANTABLE DEVICE | Status: NON-FUNCTIONAL
Removed: 2020-10-20

## (undated) DEVICE — INTENDED FOR TISSUE SEPARATION, AND OTHER PROCEDURES THAT REQUIRE A SHARP SURGICAL BLADE TO PUNCTURE OR CUT.: Brand: BARD-PARKER ® STAINLESS STEEL BLADES

## (undated) DEVICE — ZIMMER® STERILE DISPOSABLE TOURNIQUET CUFF WITH PLC, DUAL PORT, SINGLE BLADDER, 24 IN. (61 CM)

## (undated) DEVICE — SUTURE NONABSORBABLE MONOFILAMENT 3-0 PS-1 18 IN BLK ETHILON 1663H

## (undated) DEVICE — GLOVE,SURG,SENSICARE,ALOE,LF,PF,7: Brand: MEDLINE

## (undated) DEVICE — MINOR SET UP PK

## (undated) DEVICE — TIBURON BEACH CHAIR SHOULDER DRAPE: Brand: CONVERTORS

## (undated) DEVICE — PADDING CAST W4INXL4YD ST COT RAYON MICROPLEATED HIGHLY

## (undated) DEVICE — BIT DRL L140MM DIA2MM QUIK CPL 3 FLUT CALIB DEPTH MRK W/O

## (undated) DEVICE — SUTURE PDS II SZ 0 L36IN ABSRB VLT L36MM CT-1 1/2 CIR Z346H

## (undated) DEVICE — SUTURE MCRYL SZ 2-0 L18IN ABSRB VLT L36MM CT-1 1/2 CIR Y739D

## (undated) DEVICE — SUTURE VCRL SZ 3-0 L18IN ABSRB UD L26MM SH 1/2 CIR J864D

## (undated) DEVICE — DRESSING FOAM SELF ADH 20X10 CM ABSORBENT MEPILEX BORDER

## (undated) DEVICE — PACK COOL L W14XL6.5IN 3 LAYR CONSTR SFT CLP CLSR 4 TIE

## (undated) DEVICE — TOWEL,OR,DSP,ST,BLUE,STD,8/PK,10PK/CS: Brand: MEDLINE

## (undated) DEVICE — C-ARMOR C-ARM EQUIPMENT COVERS CLEAR STERILE UNIVERSAL FIT 12 PER CASE: Brand: C-ARMOR

## (undated) DEVICE — CHLORAPREP 26ML ORANGE

## (undated) DEVICE — MEDI-VAC NON-CONDUCTIVE SUCTION TUBING: Brand: CARDINAL HEALTH

## (undated) DEVICE — BANDAGE GZ W2XL75IN ST RAYON POLY CNFRM STRTCH LTWT

## (undated) DEVICE — GOWN,SIRUS,NON REINFRCD,LARGE,SET IN SL: Brand: MEDLINE

## (undated) DEVICE — SCREW BNE L12MM DIA3.5MM CORT S STL ST NONCANNULATED LOK
Type: IMPLANTABLE DEVICE | Status: NON-FUNCTIONAL
Removed: 2020-10-20

## (undated) DEVICE — SCREW BNE L14MM DIA3.5MM CORT S STL ST NONCANNULATED LOK
Type: IMPLANTABLE DEVICE | Status: NON-FUNCTIONAL
Removed: 2020-10-20

## (undated) DEVICE — COVER,TABLE,44X90,STERILE: Brand: MEDLINE

## (undated) DEVICE — CRADLE POS 3X5X24IN RASPBERRY ARM PRONE FOAM DISP

## (undated) DEVICE — STAPLER EXT SKIN 35 WIDE S STL STPL SQUEEZE HNDL VISISTAT